# Patient Record
Sex: FEMALE | Race: WHITE | NOT HISPANIC OR LATINO | Employment: OTHER | URBAN - METROPOLITAN AREA
[De-identification: names, ages, dates, MRNs, and addresses within clinical notes are randomized per-mention and may not be internally consistent; named-entity substitution may affect disease eponyms.]

---

## 2017-02-09 ENCOUNTER — GENERIC CONVERSION - ENCOUNTER (OUTPATIENT)
Dept: OTHER | Facility: OTHER | Age: 77
End: 2017-02-09

## 2017-03-29 ENCOUNTER — ALLSCRIPTS OFFICE VISIT (OUTPATIENT)
Dept: OTHER | Facility: OTHER | Age: 77
End: 2017-03-29

## 2017-03-29 DIAGNOSIS — N32.81 OVERACTIVE BLADDER: ICD-10-CM

## 2017-03-29 DIAGNOSIS — I10 ESSENTIAL (PRIMARY) HYPERTENSION: ICD-10-CM

## 2017-03-29 DIAGNOSIS — E03.9 HYPOTHYROIDISM: ICD-10-CM

## 2017-04-11 ENCOUNTER — APPOINTMENT (OUTPATIENT)
Dept: LAB | Age: 77
End: 2017-04-11
Payer: COMMERCIAL

## 2017-04-11 ENCOUNTER — TRANSCRIBE ORDERS (OUTPATIENT)
Dept: LAB | Age: 77
End: 2017-04-11

## 2017-04-11 DIAGNOSIS — E03.9 HYPOTHYROIDISM: ICD-10-CM

## 2017-04-11 DIAGNOSIS — N32.81 OVERACTIVE BLADDER: ICD-10-CM

## 2017-04-11 DIAGNOSIS — I10 ESSENTIAL (PRIMARY) HYPERTENSION: ICD-10-CM

## 2017-04-11 LAB
ALBUMIN SERPL BCP-MCNC: 3.4 G/DL (ref 3.5–5)
ALP SERPL-CCNC: 66 U/L (ref 46–116)
ALT SERPL W P-5'-P-CCNC: 35 U/L (ref 12–78)
ANION GAP SERPL CALCULATED.3IONS-SCNC: 9 MMOL/L (ref 4–13)
AST SERPL W P-5'-P-CCNC: 31 U/L (ref 5–45)
BASOPHILS # BLD AUTO: 0.03 THOUSANDS/ΜL (ref 0–0.1)
BASOPHILS NFR BLD AUTO: 1 % (ref 0–1)
BILIRUB SERPL-MCNC: 0.65 MG/DL (ref 0.2–1)
BUN SERPL-MCNC: 11 MG/DL (ref 5–25)
CALCIUM SERPL-MCNC: 9.1 MG/DL (ref 8.3–10.1)
CHLORIDE SERPL-SCNC: 105 MMOL/L (ref 100–108)
CHOLEST SERPL-MCNC: 188 MG/DL (ref 50–200)
CO2 SERPL-SCNC: 27 MMOL/L (ref 21–32)
CREAT SERPL-MCNC: 0.83 MG/DL (ref 0.6–1.3)
EOSINOPHIL # BLD AUTO: 0.17 THOUSAND/ΜL (ref 0–0.61)
EOSINOPHIL NFR BLD AUTO: 3 % (ref 0–6)
ERYTHROCYTE [DISTWIDTH] IN BLOOD BY AUTOMATED COUNT: 13 % (ref 11.6–15.1)
GFR SERPL CREATININE-BSD FRML MDRD: >60 ML/MIN/1.73SQ M
GLUCOSE P FAST SERPL-MCNC: 91 MG/DL (ref 65–99)
HCT VFR BLD AUTO: 44.4 % (ref 34.8–46.1)
HDLC SERPL-MCNC: 77 MG/DL (ref 40–60)
HGB BLD-MCNC: 15.1 G/DL (ref 11.5–15.4)
LDLC SERPL CALC-MCNC: 85 MG/DL (ref 0–100)
LYMPHOCYTES # BLD AUTO: 2 THOUSANDS/ΜL (ref 0.6–4.47)
LYMPHOCYTES NFR BLD AUTO: 40 % (ref 14–44)
MCH RBC QN AUTO: 34.1 PG (ref 26.8–34.3)
MCHC RBC AUTO-ENTMCNC: 34 G/DL (ref 31.4–37.4)
MCV RBC AUTO: 100 FL (ref 82–98)
MONOCYTES # BLD AUTO: 0.79 THOUSAND/ΜL (ref 0.17–1.22)
MONOCYTES NFR BLD AUTO: 16 % (ref 4–12)
NEUTROPHILS # BLD AUTO: 1.98 THOUSANDS/ΜL (ref 1.85–7.62)
NEUTS SEG NFR BLD AUTO: 40 % (ref 43–75)
NRBC BLD AUTO-RTO: 0 /100 WBCS
PLATELET # BLD AUTO: 228 THOUSANDS/UL (ref 149–390)
PMV BLD AUTO: 12.4 FL (ref 8.9–12.7)
POTASSIUM SERPL-SCNC: 4.4 MMOL/L (ref 3.5–5.3)
PROT SERPL-MCNC: 7 G/DL (ref 6.4–8.2)
RBC # BLD AUTO: 4.43 MILLION/UL (ref 3.81–5.12)
SODIUM SERPL-SCNC: 141 MMOL/L (ref 136–145)
TRIGL SERPL-MCNC: 130 MG/DL
TSH SERPL DL<=0.05 MIU/L-ACNC: 2.61 UIU/ML (ref 0.36–3.74)
WBC # BLD AUTO: 4.98 THOUSAND/UL (ref 4.31–10.16)

## 2017-04-11 PROCEDURE — 80061 LIPID PANEL: CPT

## 2017-04-11 PROCEDURE — 85025 COMPLETE CBC W/AUTO DIFF WBC: CPT

## 2017-04-11 PROCEDURE — 84443 ASSAY THYROID STIM HORMONE: CPT

## 2017-04-11 PROCEDURE — 80053 COMPREHEN METABOLIC PANEL: CPT

## 2017-04-11 PROCEDURE — 36415 COLL VENOUS BLD VENIPUNCTURE: CPT

## 2017-05-03 ENCOUNTER — GENERIC CONVERSION - ENCOUNTER (OUTPATIENT)
Dept: OTHER | Facility: OTHER | Age: 77
End: 2017-05-03

## 2017-05-03 ENCOUNTER — ALLSCRIPTS OFFICE VISIT (OUTPATIENT)
Dept: OTHER | Facility: OTHER | Age: 77
End: 2017-05-03

## 2017-05-03 DIAGNOSIS — M25.562 PAIN IN LEFT KNEE: ICD-10-CM

## 2017-05-03 DIAGNOSIS — Z00.00 ENCOUNTER FOR GENERAL ADULT MEDICAL EXAMINATION WITHOUT ABNORMAL FINDINGS: ICD-10-CM

## 2017-05-03 DIAGNOSIS — M25.561 PAIN IN RIGHT KNEE: ICD-10-CM

## 2017-05-17 ENCOUNTER — HOSPITAL ENCOUNTER (OUTPATIENT)
Dept: RADIOLOGY | Age: 77
Discharge: HOME | End: 2017-05-17
Payer: COMMERCIAL

## 2017-05-17 DIAGNOSIS — Z00.00 ENCOUNTER FOR GENERAL ADULT MEDICAL EXAMINATION WITHOUT ABNORMAL FINDINGS: ICD-10-CM

## 2017-05-17 PROCEDURE — G0202 SCR MAMMO BI INCL CAD: HCPCS

## 2017-05-26 ENCOUNTER — HOSPITAL ENCOUNTER (OUTPATIENT)
Dept: RADIOLOGY | Facility: MEDICAL CENTER | Age: 77
Discharge: HOME | End: 2017-05-26
Payer: COMMERCIAL

## 2017-05-26 ENCOUNTER — ALLSCRIPTS OFFICE VISIT (OUTPATIENT)
Dept: OTHER | Facility: OTHER | Age: 77
End: 2017-05-26

## 2017-05-26 DIAGNOSIS — M25.562 PAIN IN LEFT KNEE: ICD-10-CM

## 2017-05-26 DIAGNOSIS — M25.561 PAIN IN RIGHT KNEE: ICD-10-CM

## 2017-05-26 PROCEDURE — 73560 X-RAY EXAM OF KNEE 1 OR 2: CPT

## 2017-09-15 ENCOUNTER — GENERIC CONVERSION - ENCOUNTER (OUTPATIENT)
Dept: OTHER | Facility: OTHER | Age: 77
End: 2017-09-15

## 2017-09-16 ENCOUNTER — ALLSCRIPTS OFFICE VISIT (OUTPATIENT)
Dept: OTHER | Facility: OTHER | Age: 77
End: 2017-09-16

## 2017-09-16 ENCOUNTER — APPOINTMENT (OUTPATIENT)
Dept: LAB | Facility: MEDICAL CENTER | Age: 77
End: 2017-09-16
Payer: COMMERCIAL

## 2017-09-16 DIAGNOSIS — K57.92 DIVERTICULITIS OF INTESTINE WITHOUT PERFORATION OR ABSCESS WITHOUT BLEEDING: ICD-10-CM

## 2017-09-16 DIAGNOSIS — R30.0 DYSURIA: ICD-10-CM

## 2017-09-16 LAB
ALBUMIN SERPL BCP-MCNC: 4.1 G/DL (ref 3.5–5)
ALP SERPL-CCNC: 67 U/L (ref 46–116)
ALT SERPL W P-5'-P-CCNC: 31 U/L (ref 12–78)
AMYLASE SERPL-CCNC: 35 IU/L (ref 25–115)
ANION GAP SERPL CALCULATED.3IONS-SCNC: 9 MMOL/L (ref 4–13)
AST SERPL W P-5'-P-CCNC: 29 U/L (ref 5–45)
BASOPHILS # BLD AUTO: 0.01 THOUSANDS/ΜL (ref 0–0.1)
BASOPHILS NFR BLD AUTO: 0 % (ref 0–1)
BILIRUB SERPL-MCNC: 0.67 MG/DL (ref 0.2–1)
BILIRUB UR QL STRIP: NEGATIVE
BUN SERPL-MCNC: 21 MG/DL (ref 5–25)
CALCIUM SERPL-MCNC: 9.6 MG/DL (ref 8.3–10.1)
CHLORIDE SERPL-SCNC: 106 MMOL/L (ref 100–108)
CLARITY UR: NORMAL
CO2 SERPL-SCNC: 21 MMOL/L (ref 21–32)
COLOR UR: YELLOW
CREAT SERPL-MCNC: 1.04 MG/DL (ref 0.6–1.3)
EOSINOPHIL # BLD AUTO: 0 THOUSAND/ΜL (ref 0–0.61)
EOSINOPHIL NFR BLD AUTO: 0 % (ref 0–6)
ERYTHROCYTE [DISTWIDTH] IN BLOOD BY AUTOMATED COUNT: 12.8 % (ref 11.6–15.1)
ERYTHROCYTE [SEDIMENTATION RATE] IN BLOOD: 7 MM/HOUR (ref 0–20)
GFR SERPL CREATININE-BSD FRML MDRD: 53 ML/MIN/1.73SQ M
GLUCOSE (HISTORICAL): NEGATIVE
GLUCOSE SERPL-MCNC: 137 MG/DL (ref 65–140)
HCT VFR BLD AUTO: 47.7 % (ref 34.8–46.1)
HGB BLD-MCNC: 16.2 G/DL (ref 11.5–15.4)
HGB UR QL STRIP.AUTO: NORMAL
KETONES UR STRIP-MCNC: NEGATIVE MG/DL
LEUKOCYTE ESTERASE UR QL STRIP: NORMAL
LYMPHOCYTES # BLD AUTO: 1.43 THOUSANDS/ΜL (ref 0.6–4.47)
LYMPHOCYTES NFR BLD AUTO: 8 % (ref 14–44)
MCH RBC QN AUTO: 34 PG (ref 26.8–34.3)
MCHC RBC AUTO-ENTMCNC: 34 G/DL (ref 31.4–37.4)
MCV RBC AUTO: 100 FL (ref 82–98)
MONOCYTES # BLD AUTO: 1.01 THOUSAND/ΜL (ref 0.17–1.22)
MONOCYTES NFR BLD AUTO: 6 % (ref 4–12)
NEUTROPHILS # BLD AUTO: 14.85 THOUSANDS/ΜL (ref 1.85–7.62)
NEUTS SEG NFR BLD AUTO: 86 % (ref 43–75)
NITRITE UR QL STRIP: NEGATIVE
NRBC BLD AUTO-RTO: 0 /100 WBCS
PH UR STRIP.AUTO: 5 [PH]
PLATELET # BLD AUTO: 259 THOUSANDS/UL (ref 149–390)
PMV BLD AUTO: 12.9 FL (ref 8.9–12.7)
POTASSIUM SERPL-SCNC: 5 MMOL/L (ref 3.5–5.3)
PROT SERPL-MCNC: 8 G/DL (ref 6.4–8.2)
PROT UR STRIP-MCNC: NORMAL MG/DL
RBC # BLD AUTO: 4.77 MILLION/UL (ref 3.81–5.12)
SODIUM SERPL-SCNC: 136 MMOL/L (ref 136–145)
SP GR UR STRIP.AUTO: 1.02
UROBILINOGEN UR QL STRIP.AUTO: NEGATIVE
WBC # BLD AUTO: 17.4 THOUSAND/UL (ref 4.31–10.16)

## 2017-09-16 PROCEDURE — 85652 RBC SED RATE AUTOMATED: CPT

## 2017-09-16 PROCEDURE — 80053 COMPREHEN METABOLIC PANEL: CPT

## 2017-09-16 PROCEDURE — 85025 COMPLETE CBC W/AUTO DIFF WBC: CPT

## 2017-09-16 PROCEDURE — 82150 ASSAY OF AMYLASE: CPT

## 2017-09-16 PROCEDURE — 36415 COLL VENOUS BLD VENIPUNCTURE: CPT

## 2017-09-17 ENCOUNTER — GENERIC CONVERSION - ENCOUNTER (OUTPATIENT)
Dept: OTHER | Facility: OTHER | Age: 77
End: 2017-09-17

## 2017-09-18 ENCOUNTER — TRANSCRIBE ORDERS (OUTPATIENT)
Dept: ADMINISTRATIVE | Facility: HOSPITAL | Age: 77
End: 2017-09-18

## 2017-09-18 ENCOUNTER — APPOINTMENT (OUTPATIENT)
Dept: LAB | Facility: HOSPITAL | Age: 77
End: 2017-09-18
Attending: INTERNAL MEDICINE
Payer: COMMERCIAL

## 2017-09-18 DIAGNOSIS — K57.92 DIVERTICULITIS OF INTESTINE WITHOUT PERFORATION OR ABSCESS WITHOUT BLEEDING, UNSPECIFIED PART OF INTESTINAL TRACT: Primary | ICD-10-CM

## 2017-09-18 DIAGNOSIS — R30.0 DYSURIA: ICD-10-CM

## 2017-09-18 PROCEDURE — 87077 CULTURE AEROBIC IDENTIFY: CPT

## 2017-09-18 PROCEDURE — 87186 SC STD MICRODIL/AGAR DIL: CPT

## 2017-09-18 PROCEDURE — 87086 URINE CULTURE/COLONY COUNT: CPT

## 2017-09-19 ENCOUNTER — HOSPITAL ENCOUNTER (OUTPATIENT)
Dept: RADIOLOGY | Age: 77
Discharge: HOME | End: 2017-09-19
Payer: COMMERCIAL

## 2017-09-19 DIAGNOSIS — K57.92 DIVERTICULITIS OF INTESTINE WITHOUT PERFORATION OR ABSCESS WITHOUT BLEEDING, UNSPECIFIED PART OF INTESTINAL TRACT: ICD-10-CM

## 2017-09-19 PROCEDURE — 74177 CT ABD & PELVIS W/CONTRAST: CPT

## 2017-09-19 RX ADMIN — IOHEXOL 100 ML: 350 INJECTION, SOLUTION INTRAVENOUS at 14:28

## 2017-09-20 LAB
BACTERIA UR CULT: NORMAL
BACTERIA UR CULT: NORMAL

## 2017-09-27 ENCOUNTER — GENERIC CONVERSION - ENCOUNTER (OUTPATIENT)
Dept: OTHER | Facility: OTHER | Age: 77
End: 2017-09-27

## 2018-01-13 VITALS
BODY MASS INDEX: 36 KG/M2 | OXYGEN SATURATION: 98 % | SYSTOLIC BLOOD PRESSURE: 140 MMHG | RESPIRATION RATE: 16 BRPM | WEIGHT: 224 LBS | HEIGHT: 66 IN | HEART RATE: 66 BPM | TEMPERATURE: 97.7 F | DIASTOLIC BLOOD PRESSURE: 72 MMHG

## 2018-01-14 VITALS
OXYGEN SATURATION: 97 % | DIASTOLIC BLOOD PRESSURE: 74 MMHG | SYSTOLIC BLOOD PRESSURE: 142 MMHG | RESPIRATION RATE: 16 BRPM | WEIGHT: 224.2 LBS | TEMPERATURE: 97.2 F | HEART RATE: 75 BPM | BODY MASS INDEX: 36.03 KG/M2 | HEIGHT: 66 IN

## 2018-01-15 VITALS
DIASTOLIC BLOOD PRESSURE: 81 MMHG | HEART RATE: 101 BPM | HEIGHT: 66 IN | SYSTOLIC BLOOD PRESSURE: 147 MMHG | BODY MASS INDEX: 36 KG/M2 | WEIGHT: 224 LBS

## 2018-01-16 NOTE — MISCELLANEOUS
Message  left message that i received her message about the vesicare being too expensive  she was on oxybutinin in the past and does she want us to call that in        Signatures   Electronically signed by : Carolyn Coello DO; Feb 9 2017 11:16AM EST                       (Author)

## 2018-01-17 NOTE — MISCELLANEOUS
Message  told pt her ct results - she is feeling improved with antibiotics      Signatures   Electronically signed by : Luke Tomlinson DO; Sep 27 2017  5:31AM EST                       (Author)

## 2018-01-22 VITALS
SYSTOLIC BLOOD PRESSURE: 146 MMHG | DIASTOLIC BLOOD PRESSURE: 79 MMHG | BODY MASS INDEX: 36 KG/M2 | WEIGHT: 224 LBS | HEIGHT: 66 IN | HEART RATE: 65 BPM

## 2018-01-22 VITALS
DIASTOLIC BLOOD PRESSURE: 84 MMHG | RESPIRATION RATE: 16 BRPM | OXYGEN SATURATION: 97 % | SYSTOLIC BLOOD PRESSURE: 120 MMHG | TEMPERATURE: 98.4 F | HEART RATE: 70 BPM

## 2018-01-24 ENCOUNTER — OFFICE VISIT (OUTPATIENT)
Dept: FAMILY MEDICINE CLINIC | Facility: CLINIC | Age: 78
End: 2018-01-24
Payer: COMMERCIAL

## 2018-01-24 VITALS
HEART RATE: 74 BPM | SYSTOLIC BLOOD PRESSURE: 108 MMHG | DIASTOLIC BLOOD PRESSURE: 72 MMHG | BODY MASS INDEX: 36 KG/M2 | HEIGHT: 66 IN | TEMPERATURE: 98.8 F | RESPIRATION RATE: 16 BRPM | OXYGEN SATURATION: 96 % | WEIGHT: 224 LBS

## 2018-01-24 DIAGNOSIS — I10 ESSENTIAL HYPERTENSION: ICD-10-CM

## 2018-01-24 DIAGNOSIS — R00.2 PALPITATIONS: Primary | ICD-10-CM

## 2018-01-24 DIAGNOSIS — R94.31 EKG, ABNORMAL: ICD-10-CM

## 2018-01-24 PROBLEM — E03.9 HYPOTHYROIDISM: Status: ACTIVE | Noted: 2018-01-24

## 2018-01-24 PROBLEM — N32.81 OVERACTIVE BLADDER: Status: ACTIVE | Noted: 2018-01-24

## 2018-01-24 PROCEDURE — 99215 OFFICE O/P EST HI 40 MIN: CPT | Performed by: INTERNAL MEDICINE

## 2018-01-24 PROCEDURE — 3725F SCREEN DEPRESSION PERFORMED: CPT | Performed by: INTERNAL MEDICINE

## 2018-01-24 PROCEDURE — 93000 ELECTROCARDIOGRAM COMPLETE: CPT | Performed by: INTERNAL MEDICINE

## 2018-01-24 PROCEDURE — 1101F PT FALLS ASSESS-DOCD LE1/YR: CPT | Performed by: INTERNAL MEDICINE

## 2018-01-24 RX ORDER — AMPICILLIN TRIHYDRATE 250 MG
CAPSULE ORAL
COMMUNITY
Start: 2016-05-27

## 2018-01-24 RX ORDER — OXYBUTYNIN CHLORIDE 10 MG/1
1 TABLET, EXTENDED RELEASE ORAL
COMMUNITY
Start: 2016-05-27 | End: 2019-01-09 | Stop reason: SDUPTHER

## 2018-01-24 RX ORDER — LOSARTAN POTASSIUM 25 MG/1
1 TABLET ORAL DAILY
COMMUNITY
Start: 2016-05-27 | End: 2018-03-23 | Stop reason: SDUPTHER

## 2018-01-24 RX ORDER — LEVOTHYROXINE SODIUM 112 UG/1
1 TABLET ORAL DAILY
COMMUNITY
Start: 2016-05-27 | End: 2018-02-05 | Stop reason: SDUPTHER

## 2018-01-24 RX ORDER — NICOTINE POLACRILEX 2 MG
GUM BUCCAL
COMMUNITY
Start: 2016-05-27

## 2018-01-24 RX ORDER — MONTELUKAST SODIUM 10 MG/1
1 TABLET ORAL DAILY
COMMUNITY
Start: 2016-05-27 | End: 2018-03-23 | Stop reason: SDUPTHER

## 2018-01-24 RX ORDER — METOPROLOL SUCCINATE 50 MG/1
1 TABLET, EXTENDED RELEASE ORAL DAILY
COMMUNITY
Start: 2016-05-27 | End: 2019-01-09 | Stop reason: SDUPTHER

## 2018-01-24 RX ORDER — CHLORAL HYDRATE 500 MG
CAPSULE ORAL
COMMUNITY
Start: 2016-05-27

## 2018-01-24 RX ORDER — DOCUSATE SODIUM 100 MG/1
CAPSULE, LIQUID FILLED ORAL 3 TIMES DAILY
COMMUNITY
Start: 2016-05-27

## 2018-01-24 NOTE — PROGRESS NOTES
Assessment    1  HTN (hypertension) (401 9) (I10)   2  Hypothyroidism (244 9) (E03 9)   3  Seasonal allergies (477 9) (J30 2)   4  Right knee pain (719 46) (M28 561)    Plan  Health Maintenance    · * MAMMO SCREENING BILATERAL W CAD; Status:Hold For - Scheduling; Requested  for:82Zmi1814;   Right knee pain    · *1 - [facility]  *  Status: Active  Requested for:  54SXC5856  Care Summary provided  : Yes    Discussion/Summary    Pt states she received prevnar and pneumovax  Impression: Initial Annual Wellness Visit  Cardiovascular screening and counseling: the risks and benefits of screening were discussed and screening is current  Diabetes screening and counseling: the risks and benefits of screening were discussed and screening is current  Colorectal cancer screening and counseling: the risks and benefits of screening were discussed and screening is current  Cervical cancer screening and counseling: the risks and benefits of screening were discussed and screening not indicated  Osteoporosis screening and counseling: the risks and benefits of screening were discussed and screening is current  Glaucoma screening and counseling: the risks and benefits of screening were discussed and screening is current  Immunizations: the risks and benefits of influenza vaccination were discussed with the patient, influenza vaccine is up to date this year, the risks and benefits of pneumococcal vaccination were discussed with the patient, the lifetime pneumococcal vaccine has been completed and the risks and benefits of the Tdap vaccine were discussed with the patient  Advance Directive Planning: complete and up to date  Patient Discussion: plan discussed with the patient, follow-up visit needed in one year  Possible side effects of new medications were reviewed with the patient/guardian today  The treatment plan was reviewed with the patient/guardian   The patient/guardian understands and agrees with the treatment plan      Chief Complaint  Patient presents for annual wellness visit  History of Present Illness  Welcome to Medicare and Wellness Visits: The patient is being seen for the initial annual wellness visit  Medicare Screening and Risk Factors   Hospitalizations: she has been previously hospitalizied  Once per lifetime medicare screening tests: ECG  Medicare Screening Tests Risk Questions   Osteoporosis risk assessment: female gender and over 48years of age  HIV risk assessment: denies [facility]'s  Drug and Alcohol Use: The patient has never smoked cigarettes  She has never used illicit drugs  Diet and Physical Activity: Current diet includes well balanced meals  Exercise: walking  Mood Disorder and Cognitive Impairment Screening: She denies feeling down, depressed, or hopeless over the past two weeks  She denies feeling little interest or pleasure in doing things over the past two weeks  Cognitive impairment screening: denies difficulty learning/retaining new information, denies difficulty handling complex tasks, denies difficulty with reasoning, denies difficulty with spatial ability and orientation, denies difficulty with language and denies difficulty with behavior  Functional Ability/Level of Safety: Hearing is normal bilaterally and a hearing aid is not used  The patient is currently able to do activities of daily living without limitations, able to do instrumental activities of daily living without limitations and able to participate in social activities without limitations  Activities of daily living details: does not need help using the phone, no transportation help needed, does not need help shopping, no meal preparation help needed, does not need help doing housework, does not need help doing laundry, does not need help managing medications and does not need help managing money   Fall risk factors:  deconditioning, but no mobility impairment, no urinary incontinence, no cognitive impairment and no previous fall  Home safety risk factors:  no grab bars in the bathroom and does not have grab bars in th bathroom  Advance Directives: Advance directives: living will, advance directives and full code  end of life decisions were reviewed with the patient and I agree with the patient's decisions  Co-Managers and Medical Equipment/Suppliers: See Patient Care Team      Review of Systems    Constitutional: negative  Head and Face: negative  Eyes: negative  ENT: negative  Cardiovascular: negative  Respiratory: negative  Gastrointestinal: negative  Active Problems    1  Fatigue (780 79) (R53 83)   2  HTN (hypertension) (401 9) (I10)   3  Hypothyroidism (244 9) (E03 9)   4  Muscle ache (729 1) (M79 1)   5  Nasal crusting (478 19) (J34 89)   6  Nasal mucositis (ulcerative) (478 11) (J34 81)   7  Overactive bladder (596 51) (N32 81)   8  Post-menopausal (V49 81) (Z78 0)   9  Seasonal allergies (477 9) (J30 2)    Past Medical History    · History of Acute URI (465 9) (J06 9)   · History of Denial of substance abuse   · History of fatigue (V13 89) (L70 420)   · Denied: History of mental disorder   · History of Muscle ache (729 1) (M79 1)    The active problems and past medical history were reviewed and updated today  Surgical History    · History of Amputation Of Leg Above Knee   · History of Ankle Surgery   · History of Appendectomy   · History of Cholecystectomy Laparoscopic   · History of Hand Incision Tendon Sheath Of A Finger   · History of Neuroplasty Decompression Median Nerve At Carpal Tunnel   · History of Total Abdominal Hysterectomy With Removal Of Both Ovaries   · History of Tubal Ligation    The surgical history was reviewed and updated today         Family History  Mother    · Family history of   Father    · Family history of   Family History    · Denied: Family history of Denial of substance abuse   · Denied: No family history of mental disorder    The family history was reviewed and updated today  Social History    · Never a smoker   · Occasional alcohol use  The social history was reviewed and updated today  The social history was reviewed and is unchanged  Current Meds   1  Aspir-81 81 MG Oral Tablet Delayed Release; TAKE 1 TABLET (81 MG TOTAL) BY   MOUTH DAILY; Therapy: 80PVP9686 to Recorded   2  Biotin 1 MG Oral Capsule; Therapy: 10PKP9613 to Recorded   3  Colace 100 MG Oral Capsule; tid; Therapy: 57QDG3912 to Recorded   4  Coral Calcium 500 MG TABS; Therapy: 80GSI2838 to Recorded   5  Levothyroxine Sodium 112 MCG Oral Tablet; TAKE 1 TABLET DAILY; Therapy: 81KMX7930 to (Ed Doctor)  Requested for: 04JYQ5712; Last   Rx:10Jan2017 Ordered   6  Losartan Potassium 25 MG Oral Tablet; TAKE 1 TABLET DAILY; Therapy: 99FZT1490 to (Evaluate:39Bpc8744)  Requested for: 54NFM7435; Last   Rx:29Mar2017 Ordered   7  Metamucil 48 57 % Oral Powder; Therapy: 20YSM4686 to Recorded   8  Metoprolol Succinate ER 50 MG Oral Tablet Extended Release 24 Hour; TAKE 1 TABLET   DAILY; Therapy: 33NHB1874 to (Ed Doctor)  Requested for: 40SAU5447; Last   Rx:10Jan2017 Ordered   9  Montelukast Sodium 10 MG Oral Tablet; TAKE 1 TABLET DAILY AS DIRECTED; Therapy: 02WWU6341 to (Ivone Gilbert)  Requested for: 89QDM2359; Last   Rx:29Mar2017 Ordered   10  Multiple Vitamins Oral Tablet; Therapy: 76EQZ8190 to Recorded   11  Omega 3 1000 MG Oral Capsule; Therapy: 44RUH2888 to Recorded   12  Oxybutynin Chloride ER 10 MG Oral Tablet Extended Release 24 Hour; TAKE 1 TABLET    Bedtime; Therapy: 13JDG3096 to (UXSQA:62JNA7329)  Requested for: 11EZX6430; Last    Rx:92Tkk6541 Ordered   13  Red Yeast Rice 600 MG Oral Capsule; Therapy: 46EIR6256 to Recorded    The medication list was reviewed and updated today  Allergies    1   No Known Drug Allergies    Immunizations   1    PPSV  01-Oct-2014     Vitals  Signs    Temperature: 98 4 F, Tympanic  Heart Rate: 70, L Brachial Artery  Pulse Quality: Normal, L Brachial Artery  Respiration Quality: Normal  Respiration: 16  Systolic: 670, LUE, Sitting  Diastolic: 84, LUE, Sitting  BP Cuff Size: Standard  O2 Saturation: 97    Physical Exam    Constitutional   General appearance: No acute distress, well appearing and well nourished  Head and Face   Head and face: Normal     Palpation of the face and sinuses: No sinus tenderness  Eyes   Conjunctiva and lids: No swelling, erythema or discharge  Pupils and irises: Equal, round, reactive to light  Ears, Nose, Mouth, and Throat   External inspection of ears and nose: Normal     Otoscopic examination: Tympanic membranes translucent with normal light reflex  Canals patent without erythema  Hearing: Normal     Nasal mucosa, septum, and turbinates: Normal without edema or erythema  Lips, teeth, and gums: Normal, good dentition  Oropharynx: Normal with no erythema, edema, exudate or lesions  Neck   Neck: Supple, symmetric, trachea midline, no masses  Thyroid: Normal, no thyromegaly  Pulmonary   Respiratory effort: No increased work of breathing or signs of respiratory distress  Palpation of chest: Normal     Auscultation of lungs: Clear to auscultation  Cardiovascular   Auscultation of heart: Normal rate and rhythm, normal S1 and S2, no murmurs  Carotid pulses: 2+ bilaterally  Abdomen   Abdomen: Non-tender, no masses  Liver and spleen: No hepatomegaly or splenomegaly  Lymphatic   Palpation of lymph nodes in neck: No lymphadenopathy         Future Appointments    Date/Time Provider Specialty Site   11/15/2017 10:30 AM Merary Yarbrough DO Internal Medicine [address] [facility]   Electronically signed by : Sabina Serna DO; May  3 2017 12:24PM EST                       (Author)

## 2018-01-24 NOTE — PROGRESS NOTES
Assessment/Plan:    No problem-specific Assessment & Plan notes found for this encounter  Diagnoses and all orders for this visit:    Palpitations  -     POCT ECG    Essential hypertension    Other orders  -     Aspirin (ASPIR-81 PO); Aspir-81 81 MG Oral Tablet Delayed Release  TAKE 1 TABLET (81 MG TOTAL) BY MOUTH DAILY  Refills: 0       [place] Settles DO;  Started 27-May-2016  Active  -     Biotin 1 MG CAPS; Biotin 1 MG Oral Capsule   Refills: 0       Andrew Settles DO;  Started 27-May-2016  Active  -     docusate sodium (COLACE) 100 mg capsule; Take by mouth 3 (three) times a day  -     Coral Calcium 1000 (390 Ca) MG TABS; Coral Calcium 500 MG Oral Tablet   Refills: 0       [place] Settles DO;  Started 27-May-2016  Active  -     levothyroxine 112 mcg tablet; Take 1 tablet by mouth daily  -     losartan (COZAAR) 25 mg tablet; Take 1 tablet by mouth daily  -     Psyllium (METAMUCIL FIBER PO); Take by mouth  -     metoprolol succinate (TOPROL-XL) 50 mg 24 hr tablet; Take 1 tablet by mouth daily  -     montelukast (SINGULAIR) 10 mg tablet; Take 1 tablet by mouth daily  -     Multiple Vitamins-Minerals (MULTIVITAMIN ADULT PO); Take by mouth  -     Omega-3 1000 MG CAPS; Take by mouth  -     oxybutynin (DITROPAN-XL) 10 MG 24 hr tablet; Take 1 tablet by mouth  -     Red Yeast Rice 600 MG CAPS; Take by mouth  -     doxylamine (UNISON) 25 MG tablet; Sleep Aid 25 MG Oral Tablet   Refills: 0       Andrew Settles DO;  Started 27-May-2016  Active          Subjective:      Patient ID: Alban Fothergill is a 78 y o  female  Pt complains of palpitations  She was afraid to drive  She denies syncope  The palp started over the weekend  It happens intermitant  Denies sob  She felt it was worse at night  She also says she was in the hospital in [place]  She was told her ekg was normal      Palpitations   Pertinent negatives include no nausea         The following portions of the patient's history were reviewed and updated as appropriate: allergies, current medications, past family history, past medical history, past social history, past surgical history and problem list     Review of Systems   Constitutional: Negative  HENT: Negative  Respiratory: Negative  Cardiovascular: Positive for palpitations and leg swelling  Gastrointestinal: Negative for nausea  Neurological: Negative for syncope  Objective:     Physical Exam   Constitutional: She appears well-developed and well-nourished  HENT:   Head: Normocephalic and atraumatic  Neck: Normal range of motion  Neck supple  Cardiovascular: Normal rate and regular rhythm      Pulmonary/Chest: Effort normal and breath sounds normal

## 2018-01-29 ENCOUNTER — TRANSITIONAL CARE MANAGEMENT (OUTPATIENT)
Dept: FAMILY MEDICINE CLINIC | Facility: CLINIC | Age: 78
End: 2018-01-29

## 2018-02-05 ENCOUNTER — OFFICE VISIT (OUTPATIENT)
Dept: FAMILY MEDICINE CLINIC | Facility: CLINIC | Age: 78
End: 2018-02-05
Payer: COMMERCIAL

## 2018-02-05 VITALS
RESPIRATION RATE: 16 BRPM | SYSTOLIC BLOOD PRESSURE: 122 MMHG | HEIGHT: 66 IN | TEMPERATURE: 97.7 F | OXYGEN SATURATION: 98 % | DIASTOLIC BLOOD PRESSURE: 76 MMHG | HEART RATE: 80 BPM | WEIGHT: 225.8 LBS | BODY MASS INDEX: 36.29 KG/M2

## 2018-02-05 DIAGNOSIS — IMO0001 TRANSITION OF CARE PERFORMED WITH SHARING OF CLINICAL SUMMARY: Primary | ICD-10-CM

## 2018-02-05 DIAGNOSIS — I10 HYPERTENSION, UNSPECIFIED TYPE: ICD-10-CM

## 2018-02-05 DIAGNOSIS — K21.9 GASTROESOPHAGEAL REFLUX DISEASE WITHOUT ESOPHAGITIS: ICD-10-CM

## 2018-02-05 DIAGNOSIS — E03.9 HYPOTHYROIDISM, UNSPECIFIED TYPE: ICD-10-CM

## 2018-02-05 PROCEDURE — 99495 TRANSJ CARE MGMT MOD F2F 14D: CPT | Performed by: INTERNAL MEDICINE

## 2018-02-05 RX ORDER — LEVOTHYROXINE SODIUM 112 UG/1
112 TABLET ORAL DAILY
Qty: 90 TABLET | Refills: 1 | Status: SHIPPED | OUTPATIENT
Start: 2018-02-05 | End: 2018-06-22

## 2018-02-05 NOTE — PATIENT INSTRUCTIONS
rto q 6 months  Her card eval was negative and her s/s felt from gerd    She will use pepcid instead of ppi 2nd to h/a from ppi

## 2018-02-05 NOTE — PROGRESS NOTES
Assessment/Plan:         Diagnoses and all orders for this visit:    Transition of care performed with sharing of clinical summary  Comments:  abnormal ekg - saw card and felt negative cardiac problem  also neg for dvt with her swollen rt leg  Gastroesophageal reflux disease without esophagitis  Comments:  she will try pepcid bid  she relates got h/a from ppi    Hypertension, unspecified type  Comments:  controlled  Hypothyroidism, unspecified type  Comments:  controlled on med  Orders:  -     levothyroxine 112 mcg tablet; Take 1 tablet (112 mcg total) by mouth daily for 90 days          Subjective:      Patient ID: Kaden Emanuel is a 78 y o  female  Pt is back from hospital   She says she was diagnosed with gerd  Her ekg was abnormal and card saw her and they did not feel she had old mi  The following portions of the patient's history were reviewed and updated as appropriate: She  has no past medical history on file  She  does not have any pertinent problems on file  She  has a past surgical history that includes Gallbladder surgery; Appendectomy; and Hysterectomy  Her family history is not on file  She  reports that she has never smoked  She has never used smokeless tobacco  She reports that she drinks alcohol  She reports that she does not use drugs  Current Outpatient Prescriptions   Medication Sig Dispense Refill    Aspirin (ASPIR-81 PO) Aspir-81 81 MG Oral Tablet Delayed Release  TAKE 1 TABLET (81 MG TOTAL) BY MOUTH DAILY     Refills: 0       Josette Corti DO;  Started 27-May-2016  Active      Biotin 1 MG CAPS Biotin 1 MG Oral Capsule   Refills: 0       Josette Corti DO;  Started 27-May-2016  Active      Coral Calcium 1000 (390 Ca) MG TABS Coral Calcium 500 MG Oral Tablet   Refills: 0       Josette Corti DO;  Started 27-May-2016  Active      docusate sodium (COLACE) 100 mg capsule Take by mouth 3 (three) times a day      doxylamine (UNISON) 25 MG tablet Sleep Aid 25 MG Oral Tablet   Refills: 0       Vitale Shawnee DO;  Started 27-May-2016  Active      levothyroxine 112 mcg tablet Take 1 tablet (112 mcg total) by mouth daily for 90 days 90 tablet 1    losartan (COZAAR) 25 mg tablet Take 1 tablet by mouth daily      metoprolol succinate (TOPROL-XL) 50 mg 24 hr tablet Take 1 tablet by mouth daily      montelukast (SINGULAIR) 10 mg tablet Take 1 tablet by mouth daily      Multiple Vitamins-Minerals (MULTIVITAMIN ADULT PO) Take by mouth      Omega-3 1000 MG CAPS Take by mouth      oxybutynin (DITROPAN-XL) 10 MG 24 hr tablet Take 1 tablet by mouth      Psyllium (METAMUCIL FIBER PO) Take by mouth      Red Yeast Rice 600 MG CAPS Take by mouth       No current facility-administered medications for this visit  Current Outpatient Prescriptions on File Prior to Visit   Medication Sig    Aspirin (ASPIR-81 PO) Aspir-81 81 MG Oral Tablet Delayed Release  TAKE 1 TABLET (81 MG TOTAL) BY MOUTH DAILY     Refills: 0       Vitale Shawnee DO;  Started 27-May-2016  Active    Biotin 1 MG CAPS Biotin 1 MG Oral Capsule   Refills: 0       [facility] DO;  Started 27-May-2016  Active    Coral Calcium 1000 (390 Ca) MG TABS Coral Calcium 500 MG Oral Tablet   Refills: 0       [facility] DO;  Started 27-May-2016  Active    docusate sodium (COLACE) 100 mg capsule Take by mouth 3 (three) times a day    doxylamine (UNISON) 25 MG tablet Sleep Aid 25 MG Oral Tablet   Refills: 0       [facility] DO;  Started 27-May-2016  Active    losartan (COZAAR) 25 mg tablet Take 1 tablet by mouth daily    metoprolol succinate (TOPROL-XL) 50 mg 24 hr tablet Take 1 tablet by mouth daily    montelukast (SINGULAIR) 10 mg tablet Take 1 tablet by mouth daily    Multiple Vitamins-Minerals (MULTIVITAMIN ADULT PO) Take by mouth    Omega-3 1000 MG CAPS Take by mouth    oxybutynin (DITROPAN-XL) 10 MG 24 hr tablet Take 1 tablet by mouth    Psyllium (METAMUCIL FIBER PO) Take by mouth    Red Yeast Rice 600 MG CAPS Take by mouth    [DISCONTINUED] levothyroxine 112 mcg tablet Take 1 tablet by mouth daily     No current facility-administered medications on file prior to visit  She is allergic to prednisone and vicodin [hydrocodone-acetaminophen]       Review of Systems   Constitutional: Negative  HENT: Negative  Respiratory: Negative  Cardiovascular: Negative  Negative for palpitations  Neurological: Positive for headaches  Objective:     Physical Exam   Constitutional: She appears well-developed and well-nourished  HENT:   Head: Normocephalic and atraumatic  Neck: Normal range of motion  Neck supple  Cardiovascular: Normal rate and regular rhythm  Pulmonary/Chest: Effort normal and breath sounds normal    Abdominal: Soft   Bowel sounds are normal

## 2018-03-23 DIAGNOSIS — T78.40XD ALLERGIC DISORDER, SUBSEQUENT ENCOUNTER: Primary | ICD-10-CM

## 2018-03-23 DIAGNOSIS — I10 HYPERTENSION, UNSPECIFIED TYPE: ICD-10-CM

## 2018-03-23 RX ORDER — MONTELUKAST SODIUM 10 MG/1
TABLET ORAL
Qty: 90 TABLET | Refills: 1 | Status: SHIPPED | OUTPATIENT
Start: 2018-03-23 | End: 2018-09-18 | Stop reason: SDUPTHER

## 2018-03-23 RX ORDER — LOSARTAN POTASSIUM 25 MG/1
TABLET ORAL
Qty: 90 TABLET | Refills: 1 | Status: SHIPPED | OUTPATIENT
Start: 2018-03-23 | End: 2018-09-18 | Stop reason: SDUPTHER

## 2018-04-13 ENCOUNTER — OFFICE VISIT (OUTPATIENT)
Dept: OBGYN CLINIC | Facility: MEDICAL CENTER | Age: 78
End: 2018-04-13
Payer: COMMERCIAL

## 2018-04-13 VITALS
BODY MASS INDEX: 35.36 KG/M2 | DIASTOLIC BLOOD PRESSURE: 77 MMHG | SYSTOLIC BLOOD PRESSURE: 139 MMHG | HEIGHT: 66 IN | HEART RATE: 69 BPM | WEIGHT: 220 LBS

## 2018-04-13 DIAGNOSIS — M25.561 CHRONIC PAIN OF RIGHT KNEE: Primary | ICD-10-CM

## 2018-04-13 DIAGNOSIS — M76.891 TENDINITIS OF RIGHT KNEE: ICD-10-CM

## 2018-04-13 DIAGNOSIS — G89.29 CHRONIC PAIN OF RIGHT KNEE: Primary | ICD-10-CM

## 2018-04-13 PROCEDURE — 20610 DRAIN/INJ JOINT/BURSA W/O US: CPT | Performed by: ORTHOPAEDIC SURGERY

## 2018-04-13 PROCEDURE — 99213 OFFICE O/P EST LOW 20 MIN: CPT | Performed by: ORTHOPAEDIC SURGERY

## 2018-04-13 RX ORDER — LIDOCAINE HYDROCHLORIDE 10 MG/ML
2 INJECTION, SOLUTION INFILTRATION; PERINEURAL
Status: COMPLETED | OUTPATIENT
Start: 2018-04-13 | End: 2018-04-13

## 2018-04-13 RX ORDER — BETAMETHASONE SODIUM PHOSPHATE AND BETAMETHASONE ACETATE 3; 3 MG/ML; MG/ML
12 INJECTION, SUSPENSION INTRA-ARTICULAR; INTRALESIONAL; INTRAMUSCULAR; SOFT TISSUE
Status: COMPLETED | OUTPATIENT
Start: 2018-04-13 | End: 2018-04-13

## 2018-04-13 RX ORDER — BUPIVACAINE HYDROCHLORIDE 2.5 MG/ML
2 INJECTION, SOLUTION INFILTRATION; PERINEURAL
Status: COMPLETED | OUTPATIENT
Start: 2018-04-13 | End: 2018-04-13

## 2018-04-13 RX ADMIN — BUPIVACAINE HYDROCHLORIDE 2 ML: 2.5 INJECTION, SOLUTION INFILTRATION; PERINEURAL at 13:56

## 2018-04-13 RX ADMIN — LIDOCAINE HYDROCHLORIDE 2 ML: 10 INJECTION, SOLUTION INFILTRATION; PERINEURAL at 13:56

## 2018-04-13 RX ADMIN — BETAMETHASONE SODIUM PHOSPHATE AND BETAMETHASONE ACETATE 12 MG: 3; 3 INJECTION, SUSPENSION INTRA-ARTICULAR; INTRALESIONAL; INTRAMUSCULAR; SOFT TISSUE at 13:56

## 2018-04-13 NOTE — PROGRESS NOTES
Subjective; Adult female patient known to us  It has been 6 months plus since we last saw this individual   She has had a recent bout of posterior buttock pain and posterior thigh pain that is akin to or sounds like sciatica  This occurs without back pain without motor weakness  This occurs with no symptomatology below the level of the knee  She has had previous left hip greater trochanteric bursitis and this is not resemblant of her previous bursitis  History reviewed  No pertinent past medical history  Past Surgical History:   Procedure Laterality Date    APPENDECTOMY      GALLBLADDER SURGERY      HYSTERECTOMY         Family History   Problem Relation Age of Onset    Heart disease Mother     Heart disease Father        Social History   Substance Use Topics    Smoking status: Never Smoker    Smokeless tobacco: Never Used    Alcohol use Yes      Comment: wine with dinner     Exam;  Her right knee offers a varus deformity  She has pain involving the medial and lateral compartments to palpation  Today she also exhibits pain in the distal iliotibial band right knee  In the standing position she had no palpable or percussive will reproducible lumbar spine pain  She had no discomfort overlying the sacral ala or the SI joint left side  She denied discomfort over the PSIS and proximal hamstring she did have modest or boring discomfort over the left lower outer quadrant with the sciatic trough  Motor exam seated position for the patient hip flexion knee extension TA EHL and peroneal function intact in symmetrical  Sensorium intact to soft sharp touch L2 through the L5 dermatomes      Large joint arthrocentesis  Date/Time: 4/13/2018 1:56 PM  Consent given by: patient  Supporting Documentation  Indications: pain   Procedure Details  Location: knee - R knee  Needle size: 22 G  Ultrasound guidance: no  Medications administered: 2 mL bupivacaine 0 25 %; 2 mL lidocaine 1 %; 12 mg betamethasone acetate-betamethasone sodium phosphate 6 (3-3) mg/mL      Large joint arthrocentesis  Date/Time: 4/13/2018 1:56 PM  Supporting Documentation  Indications: pain and diagnostic evaluation   Procedure Details  Location: knee - R knee (Distal iliotibial band)  Needle size: 22 G  Approach: lateral    Patient tolerance: patient tolerated the procedure well with no immediate complications  Dressing:  Sterile dressing applied      Impression; Historical episode of sciatica which has improved with time and NSAIDS  Her sciatica has no motor weakness  No back pain    History of arthritic knee  Iliotibial band tendinitis right knee    Plan;    She underwent injections to both the right knee intra-articular space and the distal iliotibial band at its insertion      We will see her in follow-up in 3 months or just prior to her embarking on her vacation    We are attempting to precert her for a Visco supplement or lubricant for her knees    This completed her care her exam was reviewed by and plan formulated by the attending surgeon  It was my privilege to assist him in the delivery of her care

## 2018-05-23 ENCOUNTER — HOSPITAL ENCOUNTER (OUTPATIENT)
Dept: RADIOLOGY | Age: 78
Discharge: HOME | End: 2018-05-23

## 2018-05-23 ENCOUNTER — TELEPHONE (OUTPATIENT)
Dept: FAMILY MEDICINE CLINIC | Facility: CLINIC | Age: 78
End: 2018-05-23

## 2018-05-23 DIAGNOSIS — Z12.39 SCREENING FOR BREAST CANCER: Primary | ICD-10-CM

## 2018-05-23 DIAGNOSIS — Z12.31 ENCOUNTER FOR SCREENING MAMMOGRAM FOR MALIGNANT NEOPLASM OF BREAST: ICD-10-CM

## 2018-05-29 ENCOUNTER — HOSPITAL ENCOUNTER (OUTPATIENT)
Dept: RADIOLOGY | Age: 78
Discharge: HOME | End: 2018-05-29
Payer: COMMERCIAL

## 2018-05-29 ENCOUNTER — TRANSCRIBE ORDERS (OUTPATIENT)
Dept: ADMINISTRATIVE | Age: 78
End: 2018-05-29

## 2018-05-29 DIAGNOSIS — Z12.39 SCREENING FOR BREAST CANCER: ICD-10-CM

## 2018-05-29 PROCEDURE — 77067 SCR MAMMO BI INCL CAD: CPT

## 2018-06-08 ENCOUNTER — OFFICE VISIT (OUTPATIENT)
Dept: OBGYN CLINIC | Facility: MEDICAL CENTER | Age: 78
End: 2018-06-08
Payer: COMMERCIAL

## 2018-06-08 VITALS
DIASTOLIC BLOOD PRESSURE: 80 MMHG | WEIGHT: 215 LBS | BODY MASS INDEX: 34.55 KG/M2 | HEIGHT: 66 IN | HEART RATE: 65 BPM | SYSTOLIC BLOOD PRESSURE: 138 MMHG

## 2018-06-08 DIAGNOSIS — G89.29 CHRONIC PAIN OF RIGHT KNEE: Primary | ICD-10-CM

## 2018-06-08 DIAGNOSIS — M17.11 PRIMARY OSTEOARTHRITIS OF RIGHT KNEE: ICD-10-CM

## 2018-06-08 DIAGNOSIS — M25.561 CHRONIC PAIN OF RIGHT KNEE: Primary | ICD-10-CM

## 2018-06-08 PROCEDURE — 20610 DRAIN/INJ JOINT/BURSA W/O US: CPT | Performed by: ORTHOPAEDIC SURGERY

## 2018-06-08 RX ORDER — HYALURONATE SODIUM 10 MG/ML
20 SYRINGE (ML) INTRAARTICULAR
Status: COMPLETED | OUTPATIENT
Start: 2018-06-08 | End: 2018-06-08

## 2018-06-08 RX ADMIN — Medication 20 MG: at 13:19

## 2018-06-08 NOTE — PROGRESS NOTES
78 y o female presents today for Euflexxa #1 to her right knee for ongoing treatment of her OA  She has had cortisone injections previously with various relief  This is her first round of visco       Review of Systems  Review of systems negative unless otherwise specified in HPI    Past Medical History  No past medical history on file  Past Surgical History  Past Surgical History:   Procedure Laterality Date    ABOVE KNEE LEG AMPUTATION      ANKLE SURGERY      x2    APPENDECTOMY      CHOLECYSTECTOMY      laparoscopic    GALLBLADDER SURGERY      HYSTERECTOMY Bilateral     total abdominal with removal of both ovaries    INCISION TENDON SHEATH HAND      of a finger    NEUROPLASTY / TRANSPOSITION MEDIAN NERVE AT CARPAL TUNNEL      TUBAL LIGATION         Current Medications  Current Outpatient Prescriptions on File Prior to Visit   Medication Sig Dispense Refill    Aspirin (ASPIR-81 PO) Aspir-81 81 MG Oral Tablet Delayed Release  TAKE 1 TABLET (81 MG TOTAL) BY MOUTH DAILY     Refills: 0       Nallely Na DO;  Started 27-May-2016  Active      Biotin 1 MG CAPS Biotin 1 MG Oral Capsule   Refills: 0       Nallely Na DO;  Started 27-May-2016  Active      Coral Calcium 1000 (390 Ca) MG TABS Coral Calcium 500 MG Oral Tablet   Refills: 0       Everett Na DO;  Started 27-May-2016  Active      docusate sodium (COLACE) 100 mg capsule Take by mouth 3 (three) times a day      doxylamine (UNISON) 25 MG tablet Sleep Aid 25 MG Oral Tablet   Refills: 0       Nallely Na DO;  Started 27-May-2016  Active      losartan (COZAAR) 25 mg tablet TAKE 1 TABLET DAILY 90 tablet 1    metoprolol succinate (TOPROL-XL) 50 mg 24 hr tablet Take 1 tablet by mouth daily      montelukast (SINGULAIR) 10 mg tablet TAKE 1 TABLET DAILY AS DIRECTED 90 tablet 1    Multiple Vitamins-Minerals (MULTIVITAMIN ADULT PO) Take by mouth      Omega-3 1000 MG CAPS Take by mouth      oxybutynin (DITROPAN-XL) 10 MG 24 hr tablet Take 1 tablet by mouth      Psyllium (METAMUCIL FIBER PO) Take by mouth      Red Yeast Rice 600 MG CAPS Take by mouth      levothyroxine 112 mcg tablet Take 1 tablet (112 mcg total) by mouth daily for 90 days 90 tablet 1     No current facility-administered medications on file prior to visit  Recent Labs [facility])    0  Lab Value Date/Time   HCT 47 7 (H) 09/16/2017 1157   HGB 16 2 (H) 09/16/2017 1157   WBC 17 40 (H) 09/16/2017 1157   ESR 7 09/16/2017 1157   GLUCOSE 137 09/16/2017 1157         Physical exam  · General: Awake, Alert, Oriented  · Eyes: Pupils equal, round and reactive to light  · Heart: regular rate and rhythm  · Lungs: No audible wheezing  · Abdomen: soft    Right Knee:  Skin intact, mild varus alignment  TTP medial joint line  WNL ROM moderate patellar crepitus  Mild swelling without effusion  stable to varus/valgus stress  NVID        Imaging  None indicated    Procedure  Large joint arthrocentesis  Date/Time: 6/8/2018 1:19 PM  Consent given by: patient  Site marked: site marked  Supporting Documentation  Indications: pain   Procedure Details  Location: knee - R knee  Preparation: Patient was prepped and draped in the usual sterile fashion  Needle size: 22 G  Ultrasound guidance: no  Medications administered: 20 mg Sodium Hyaluronate 20 MG/2ML              Assessment/Plan:   70 y  o female right knee OA and pain  She has had cortisone injections in the past with varying relief this is her first round of visco   Euflexxa #1 administered to her right knee today    ICE as needed  WBAT  Activities as tolerated  Follow-up each of the next 2 weeks to complete the series

## 2018-06-15 ENCOUNTER — OFFICE VISIT (OUTPATIENT)
Dept: OBGYN CLINIC | Facility: MEDICAL CENTER | Age: 78
End: 2018-06-15
Payer: COMMERCIAL

## 2018-06-15 VITALS
HEART RATE: 74 BPM | HEIGHT: 66 IN | BODY MASS INDEX: 34.55 KG/M2 | DIASTOLIC BLOOD PRESSURE: 81 MMHG | SYSTOLIC BLOOD PRESSURE: 141 MMHG | WEIGHT: 214.95 LBS

## 2018-06-15 DIAGNOSIS — M17.11 PRIMARY OSTEOARTHRITIS OF RIGHT KNEE: Primary | ICD-10-CM

## 2018-06-15 PROCEDURE — 20610 DRAIN/INJ JOINT/BURSA W/O US: CPT | Performed by: ORTHOPAEDIC SURGERY

## 2018-06-15 RX ORDER — HYALURONATE SODIUM 10 MG/ML
20 SYRINGE (ML) INTRAARTICULAR
Status: COMPLETED | OUTPATIENT
Start: 2018-06-15 | End: 2018-06-15

## 2018-06-15 RX ADMIN — Medication 20 MG: at 12:58

## 2018-06-15 RX ADMIN — Medication 20 MG: at 14:53

## 2018-06-15 NOTE — PROGRESS NOTES
77-year-old female here for ongoing viscosupplementation to the right knee  Exam confirms neither erythema or effusion  Assessment/plan:  77-year-old female with osteoarthritis of the right knee that requires ongoing viscosupplementation  It is advised, except, administers outlined above   I would welcome the opportunity see back in the office next week for ongoing viscosupplementation of the right knee  Large joint arthrocentesis  Date/Time: 6/15/2018 2:53 PM  Consent given by: patient  Supporting Documentation  Indications: pain   Procedure Details  Location: knee - R knee  Approach: anteromedial  Medications administered: 20 mg Sodium Hyaluronate 20 MG/2ML    Patient tolerance: patient tolerated the procedure well with no immediate complications  Dressing:  Sterile dressing applied

## 2018-06-15 NOTE — PROGRESS NOTES
1  Primary osteoarthritis of right knee  Large joint arthrocentesis     Patient is here for her 2nd injection of Euflexxa  into the right knee  Patient reports no problems or improvement from the last injection  All organ systems normal  Physical exam of the knee shows no effusion no ecchymosis        Large joint arthrocentesis  Date/Time: 6/15/2018 12:58 PM  Consent given by: patient  Site marked: site marked  Procedure Details  Location: knee - R knee  Ultrasound guidance: no  Medications administered: 20 mg Sodium Hyaluronate 20 MG/2ML  Specialty Pharmacy Supplied: received medications from pharmacy  Patient tolerance: patient tolerated the procedure well with no immediate complications  Dressing:  Sterile dressing applied        Patient tolerated procedure follow up in one week for third injection of Euflexxa

## 2018-06-22 ENCOUNTER — OFFICE VISIT (OUTPATIENT)
Dept: OBGYN CLINIC | Facility: MEDICAL CENTER | Age: 78
End: 2018-06-22
Payer: COMMERCIAL

## 2018-06-22 VITALS
HEIGHT: 66 IN | SYSTOLIC BLOOD PRESSURE: 132 MMHG | DIASTOLIC BLOOD PRESSURE: 81 MMHG | BODY MASS INDEX: 34.55 KG/M2 | HEART RATE: 61 BPM | WEIGHT: 214.95 LBS

## 2018-06-22 DIAGNOSIS — M25.561 CHRONIC PAIN OF RIGHT KNEE: Primary | ICD-10-CM

## 2018-06-22 DIAGNOSIS — M17.11 PRIMARY OSTEOARTHRITIS OF RIGHT KNEE: ICD-10-CM

## 2018-06-22 DIAGNOSIS — G89.29 CHRONIC PAIN OF RIGHT KNEE: Primary | ICD-10-CM

## 2018-06-22 PROCEDURE — 20610 DRAIN/INJ JOINT/BURSA W/O US: CPT | Performed by: ORTHOPAEDIC SURGERY

## 2018-06-22 RX ORDER — HYALURONATE SODIUM 10 MG/ML
20 SYRINGE (ML) INTRAARTICULAR
Status: COMPLETED | OUTPATIENT
Start: 2018-06-22 | End: 2018-06-22

## 2018-06-22 RX ORDER — LEVOTHYROXINE SODIUM 112 UG/1
112 TABLET ORAL DAILY
COMMUNITY
End: 2019-05-08

## 2018-06-22 RX ORDER — LIDOCAINE HYDROCHLORIDE 10 MG/ML
5 INJECTION, SOLUTION INFILTRATION; PERINEURAL
Status: COMPLETED | OUTPATIENT
Start: 2018-06-22 | End: 2018-06-22

## 2018-06-22 RX ADMIN — LIDOCAINE HYDROCHLORIDE 5 ML: 10 INJECTION, SOLUTION INFILTRATION; PERINEURAL at 10:21

## 2018-06-22 RX ADMIN — Medication 20 MG: at 10:21

## 2018-06-22 NOTE — PROGRESS NOTES
78 y o female with known right knee OA and is here today for her 3rd Euflexxa injection to her right knee  She has had varying relief thus far with her series  Review of Systems  Review of systems negative unless otherwise specified in HPI    Past Medical History  No past medical history on file  Past Surgical History  Past Surgical History:   Procedure Laterality Date    ABOVE KNEE LEG AMPUTATION      ANKLE SURGERY      x2    APPENDECTOMY      CHOLECYSTECTOMY      laparoscopic    GALLBLADDER SURGERY      HYSTERECTOMY Bilateral     total abdominal with removal of both ovaries    INCISION TENDON SHEATH HAND      of a finger    NEUROPLASTY / TRANSPOSITION MEDIAN NERVE AT CARPAL TUNNEL      TUBAL LIGATION         Current Medications  Current Outpatient Prescriptions on File Prior to Visit   Medication Sig Dispense Refill    Aspirin (ASPIR-81 PO) Aspir-81 81 MG Oral Tablet Delayed Release  TAKE 1 TABLET (81 MG TOTAL) BY MOUTH DAILY     Refills: 0       Birder  DO;  Started 27-May-2016  Active      Biotin 1 MG CAPS Biotin 1 MG Oral Capsule   Refills: 0       Birder  DO;  Started 27-May-2016  Active      Coral Calcium 1000 (390 Ca) MG TABS Coral Calcium 500 MG Oral Tablet   Refills: 0       Birder  DO;  Started 27-May-2016  Active      docusate sodium (COLACE) 100 mg capsule Take by mouth 3 (three) times a day      doxylamine (UNISON) 25 MG tablet Sleep Aid 25 MG Oral Tablet   Refills: 0       Birder  DO;  Started 27-May-2016  Active      losartan (COZAAR) 25 mg tablet TAKE 1 TABLET DAILY 90 tablet 1    metoprolol succinate (TOPROL-XL) 50 mg 24 hr tablet Take 1 tablet by mouth daily      montelukast (SINGULAIR) 10 mg tablet TAKE 1 TABLET DAILY AS DIRECTED 90 tablet 1    Multiple Vitamins-Minerals (MULTIVITAMIN ADULT PO) Take by mouth      Omega-3 1000 MG CAPS Take by mouth      oxybutynin (DITROPAN-XL) 10 MG 24 hr tablet Take 1 tablet by mouth      Psyllium (METAMUCIL FIBER PO) Take by mouth      Red Yeast Rice 600 MG CAPS Take by mouth      [DISCONTINUED] levothyroxine 112 mcg tablet Take 1 tablet (112 mcg total) by mouth daily for 90 days 90 tablet 1     No current facility-administered medications on file prior to visit  Recent Labs [facility])    0  Lab Value Date/Time   HCT 47 7 (H) 09/16/2017 1157   HGB 16 2 (H) 09/16/2017 1157   WBC 17 40 (H) 09/16/2017 1157   ESR 7 09/16/2017 1157   GLUCOSE 137 09/16/2017 1157         Physical exam  · General: Awake, Alert, Oriented  · Eyes: Pupils equal, round and reactive to light  · Heart: regular rate and rhythm  · Lungs: No audible wheezing  · Abdomen: soft    Right knee:   Skin intact   Good STLT  ROM WNL   No signs of infection  NVID    Imaging  None performed    Procedure  Large joint arthrocentesis  Date/Time: 6/22/2018 10:21 AM  Consent given by: patient  Site marked: site marked  Timeout: Immediately prior to procedure a time out was called to verify the correct patient, procedure, equipment, support staff and site/side marked as required   Supporting Documentation  Indications: pain and diagnostic evaluation   Procedure Details  Location: knee - R knee  Preparation: Patient was prepped and draped in the usual sterile fashion  Needle size: 22 G  Ultrasound guidance: no  Medications administered: 5 mL lidocaine 1 %; 20 mg Sodium Hyaluronate 20 MG/2ML    Patient tolerance: patient tolerated the procedure well with no immediate complications  Dressing:  Sterile dressing applied          Assessment/Plan:   70 y  o female with known right knee OA and chronic pain    Her 3rd and last Euflexxa injection performed today  ICE as indicated  Post-injection protocol advised   activities as tolerated  WBAT  Re-check in 3 months to assess the efficacy of her visco injections

## 2018-07-17 ENCOUNTER — OFFICE VISIT (OUTPATIENT)
Dept: FAMILY MEDICINE CLINIC | Facility: CLINIC | Age: 78
End: 2018-07-17
Payer: COMMERCIAL

## 2018-07-17 ENCOUNTER — APPOINTMENT (OUTPATIENT)
Dept: RADIOLOGY | Facility: MEDICAL CENTER | Age: 78
End: 2018-07-17
Payer: COMMERCIAL

## 2018-07-17 VITALS
DIASTOLIC BLOOD PRESSURE: 58 MMHG | OXYGEN SATURATION: 95 % | TEMPERATURE: 98.1 F | HEART RATE: 73 BPM | SYSTOLIC BLOOD PRESSURE: 104 MMHG | WEIGHT: 227 LBS | HEIGHT: 66 IN | RESPIRATION RATE: 16 BRPM | BODY MASS INDEX: 36.48 KG/M2

## 2018-07-17 DIAGNOSIS — M25.531 WRIST PAIN, ACUTE, RIGHT: Primary | ICD-10-CM

## 2018-07-17 DIAGNOSIS — M25.531 WRIST PAIN, ACUTE, RIGHT: ICD-10-CM

## 2018-07-17 PROCEDURE — 99213 OFFICE O/P EST LOW 20 MIN: CPT | Performed by: INTERNAL MEDICINE

## 2018-07-17 PROCEDURE — 73110 X-RAY EXAM OF WRIST: CPT

## 2018-07-17 RX ORDER — NABUMETONE 750 MG/1
750 TABLET, FILM COATED ORAL 2 TIMES DAILY
Qty: 30 TABLET | Refills: 1 | Status: SHIPPED | OUTPATIENT
Start: 2018-07-17 | End: 2018-11-08

## 2018-07-17 NOTE — PROGRESS NOTES
Assessment/Plan:         Diagnoses and all orders for this visit:    Wrist pain, acute, right  -     XR wrist 3+ vw right; Future  -     nabumetone (RELAFEN) 750 mg tablet; Take 1 tablet (750 mg total) by mouth 2 (two) times a day          Subjective:      Patient ID: Raciel Elena is a 70 y o  [place]  The following portions of the patient's history were reviewed and updated as appropriate:   She  has a past medical history of Seasonal allergies  She   Patient Active Problem List    Diagnosis Date Noted    Primary osteoarthritis of right knee 06/08/2018    Chronic pain of right knee 06/08/2018    Hypertension 01/24/2018    Hypothyroidism 01/24/2018    Overactive bladder 01/24/2018    Palpitations 01/24/2018    EKG, abnormal 01/24/2018     She  has a past surgical history that includes Gallbladder surgery; Appendectomy; Hysterectomy (Bilateral); Ankle surgery; Cholecystectomy; Incision tendon sheath hand; Neuroplasty / transposition median nerve at carpal tunnel; and Tubal ligation  Her family history includes Heart disease in her father and mother; Prostate cancer in her father  She  reports that she has never smoked  She has never used smokeless tobacco  She reports that she drinks alcohol  She reports that she does not use drugs  Current Outpatient Prescriptions   Medication Sig Dispense Refill    Aspirin (ASPIR-81 PO) Aspir-81 81 MG Oral Tablet Delayed Release  TAKE 1 TABLET (81 MG TOTAL) BY MOUTH DAILY     Refills: 0       Eric Ramos DO;  Started 27-May-2016  Active      Biotin 1 MG CAPS Biotin 1 MG Oral Capsule   Refills: 0       Eric Ramos DO;  Started 27-May-2016  Active      Coral Calcium 1000 (390 Ca) MG TABS Coral Calcium 500 MG Oral Tablet   Refills: 0       Eric Ramos DO;  Started 27-May-2016  Active      docusate sodium (COLACE) 100 mg capsule Take by mouth 3 (three) times a day      doxylamine (UNISON) 25 MG tablet Sleep Aid 25 MG Oral Tablet   Refills: 0       Jed Salinasman DO;  Started 27-May-2016  Active      levothyroxine 112 mcg tablet Take 112 mcg by mouth daily      losartan (COZAAR) 25 mg tablet TAKE 1 TABLET DAILY 90 tablet 1    metoprolol succinate (TOPROL-XL) 50 mg 24 hr tablet Take 1 tablet by mouth daily      montelukast (SINGULAIR) 10 mg tablet TAKE 1 TABLET DAILY AS DIRECTED 90 tablet 1    Multiple Vitamins-Minerals (MULTIVITAMIN ADULT PO) Take by mouth      Omega-3 1000 MG CAPS Take by mouth      oxybutynin (DITROPAN-XL) 10 MG 24 hr tablet Take 1 tablet by mouth      Psyllium (METAMUCIL FIBER PO) Take by mouth      Red Yeast Rice 600 MG CAPS Take by mouth      nabumetone (RELAFEN) 750 mg tablet Take 1 tablet (750 mg total) by mouth 2 (two) times a day 30 tablet 1     No current facility-administered medications for this visit  Current Outpatient Prescriptions on File Prior to Visit   Medication Sig    Aspirin (ASPIR-81 PO) Aspir-81 81 MG Oral Tablet Delayed Release  TAKE 1 TABLET (81 MG TOTAL) BY MOUTH DAILY     Refills: 0       Jed Ovalle DO;  Started 27-May-2016  Active    Biotin 1 MG CAPS Biotin 1 MG Oral Capsule   Refills: 0       Jed Ovalle DO;  Started 27-May-2016  Active    Coral Calcium 1000 (390 Ca) MG TABS Coral Calcium 500 MG Oral Tablet   Refills: 0       Jed Ovalle DO;  Started 27-May-2016  Active    docusate sodium (COLACE) 100 mg capsule Take by mouth 3 (three) times a day    doxylamine (UNISON) 25 MG tablet Sleep Aid 25 MG Oral Tablet   Refills: 0       Jed Ovalle DO;  Started 27-May-2016  Active    levothyroxine 112 mcg tablet Take 112 mcg by mouth daily    losartan (COZAAR) 25 mg tablet TAKE 1 TABLET DAILY    metoprolol succinate (TOPROL-XL) 50 mg 24 hr tablet Take 1 tablet by mouth daily    montelukast (SINGULAIR) 10 mg tablet TAKE 1 TABLET DAILY AS DIRECTED    Multiple Vitamins-Minerals (MULTIVITAMIN ADULT PO) Take by mouth    Omega-3 1000 MG CAPS Take by mouth    oxybutynin (DITROPAN-XL) 10 MG 24 hr tablet Take 1 tablet by mouth    Psyllium (METAMUCIL FIBER PO) Take by mouth    Red Yeast Rice 600 MG CAPS Take by mouth     No current facility-administered medications on file prior to visit  She is allergic to prednisone and vicodin [hydrocodone-acetaminophen]       Review of Systems   Constitutional: Negative  HENT: Negative  Respiratory: Negative  Cardiovascular: Negative  Musculoskeletal: Positive for arthralgias and myalgias  Objective:      /58 (BP Location: Left arm, Patient Position: Sitting, Cuff Size: Large)   Pulse 73   Temp 98 1 °F (36 7 °C) (Tympanic)   Resp 16   Ht 5' 5 98" (1 676 m)   Wt 103 kg (227 lb)   SpO2 95%   BMI 36 66 kg/m²          Physical Exam   Constitutional: She appears well-developed and well-nourished  HENT:   Head: Normocephalic and atraumatic  Neck: Normal range of motion  Neck supple  Cardiovascular: Normal rate, regular rhythm and normal heart sounds  Pulmonary/Chest: Effort normal and breath sounds normal    Abdominal: Soft   Bowel sounds are normal

## 2018-08-06 ENCOUNTER — OFFICE VISIT (OUTPATIENT)
Dept: OBGYN CLINIC | Facility: MEDICAL CENTER | Age: 78
End: 2018-08-06
Payer: COMMERCIAL

## 2018-08-06 VITALS — HEIGHT: 66 IN | WEIGHT: 225 LBS | BODY MASS INDEX: 36.16 KG/M2

## 2018-08-06 DIAGNOSIS — M65.4 DE QUERVAIN'S TENOSYNOVITIS, RIGHT: Primary | ICD-10-CM

## 2018-08-06 PROCEDURE — 99213 OFFICE O/P EST LOW 20 MIN: CPT | Performed by: ORTHOPAEDIC SURGERY

## 2018-08-06 PROCEDURE — 20550 NJX 1 TENDON SHEATH/LIGAMENT: CPT | Performed by: ORTHOPAEDIC SURGERY

## 2018-08-06 RX ADMIN — TRIAMCINOLONE ACETONIDE 20 MG: 40 INJECTION, SUSPENSION INTRA-ARTICULAR; INTRAMUSCULAR at 15:14

## 2018-08-06 RX ADMIN — LIDOCAINE HYDROCHLORIDE 0.5 ML: 10 INJECTION, SOLUTION INFILTRATION; PERINEURAL at 15:14

## 2018-08-06 NOTE — PROGRESS NOTES
CHIEF COMPLAINT:  Chief Complaint   Patient presents with    Right Wrist - Pain       SUBJECTIVE:  Kaleigh Rucker is a 70y o  year old RHD female who presents to the office with pain in her right wrist  Pt states that the pain radiates from her wrist up her forearm  Pt denies numbness and tingling except for in her thumb when she sews, although this has been ongoing for some time  Pt states that it gets worse with activity and wakes her at night  Pt states that some activities cause sharp shooting pain  Pt states that she takes sleeve for pain with temporary moderate relief  Pt states that she had a twisting injury aprox 7/10/18  Pt denies any other injuries to this wrist        PAST MEDICAL HISTORY:  Past Medical History:   Diagnosis Date    Seasonal allergies        PAST SURGICAL HISTORY:  Past Surgical History:   Procedure Laterality Date    ANKLE SURGERY      x2    APPENDECTOMY      CHOLECYSTECTOMY      laparoscopic    GALLBLADDER SURGERY      HYSTERECTOMY Bilateral     total abdominal with removal of both ovaries    INCISION TENDON SHEATH HAND      of a finger    NEUROPLASTY / TRANSPOSITION MEDIAN NERVE AT CARPAL TUNNEL      TUBAL LIGATION         FAMILY HISTORY:  Family History   Problem Relation Age of Onset    Heart disease Mother         endocarditis    Heart disease Father     Prostate cancer Father        SOCIAL HISTORY:  Social History   Substance Use Topics    Smoking status: Never Smoker    Smokeless tobacco: Never Used    Alcohol use Yes      Comment: wine with dinner; occasional use as per [employer]       MEDICATIONS:    Current Outpatient Prescriptions:     Aspirin (ASPIR-81 PO), Aspir-81 81 MG Oral Tablet Delayed Release TAKE 1 TABLET (81 MG TOTAL) BY MOUTH DAILY    Refills: 0    Hamzah Neat DO;  Started 27-May-2016 Active, Disp: , Rfl:     Biotin 1 MG CAPS, Biotin 1 MG Oral Capsule  Refills: 0    Hamzah Neat DO;  Started 27-May-2016 Active, Disp: , Rfl:     Coral Calcium 1000 (390 Ca) MG TABS, Coral Calcium 500 MG Oral Tablet  Refills: 0    Angeles Mode DO;  Started 27-May-2016 Active, Disp: , Rfl:     docusate sodium (COLACE) 100 mg capsule, Take by mouth 3 (three) times a day, Disp: , Rfl:     doxylamine (UNISON) 25 MG tablet, Sleep Aid 25 MG Oral Tablet  Refills: 0    Angeles Mode DO;  Started 27-May-2016 Active, Disp: , Rfl:     levothyroxine 112 mcg tablet, Take 112 mcg by mouth daily, Disp: , Rfl:     losartan (COZAAR) 25 mg tablet, TAKE 1 TABLET DAILY, Disp: 90 tablet, Rfl: 1    metoprolol succinate (TOPROL-XL) 50 mg 24 hr tablet, Take 1 tablet by mouth daily, Disp: , Rfl:     montelukast (SINGULAIR) 10 mg tablet, TAKE 1 TABLET DAILY AS DIRECTED, Disp: 90 tablet, Rfl: 1    Multiple Vitamins-Minerals (MULTIVITAMIN ADULT PO), Take by mouth, Disp: , Rfl:     nabumetone (RELAFEN) 750 mg tablet, Take 1 tablet (750 mg total) by mouth 2 (two) times a day, Disp: 30 tablet, Rfl: 1    Omega-3 1000 MG CAPS, Take by mouth, Disp: , Rfl:     oxybutynin (DITROPAN-XL) 10 MG 24 hr tablet, Take 1 tablet by mouth, Disp: , Rfl:     Psyllium (METAMUCIL FIBER PO), Take by mouth, Disp: , Rfl:     Red Yeast Rice 600 MG CAPS, Take by mouth, Disp: , Rfl:     ALLERGIES:  Allergies   Allergen Reactions    Prednisone Tachycardia    Vicodin [Hydrocodone-Acetaminophen] Lightheadedness       REVIEW OF SYSTEMS:  Review of Systems   Constitutional: Negative for chills, fever and unexpected weight change  HENT: Negative for hearing loss, nosebleeds and sore throat  Eyes: Negative for pain, redness and visual disturbance  Respiratory: Negative for cough, shortness of breath and wheezing  Cardiovascular: Negative for chest pain, palpitations and leg swelling  Gastrointestinal: Negative for abdominal pain, nausea and vomiting  Endocrine: Negative for polydipsia and polyuria  Genitourinary: Negative for dysuria and hematuria     Musculoskeletal: Negative for arthralgias, joint swelling and myalgias  Skin: Negative for rash and wound  Neurological: Negative for dizziness, numbness and headaches  Psychiatric/Behavioral: Negative for decreased concentration, dysphoric mood and suicidal ideas  The patient is not nervous/anxious          VITALS:  Vitals:       LABS:  HgA1c: No results found for: HGBA1C  BMP:   Lab Results   Component Value Date    GLUCOSE 137 09/16/2017    CALCIUM 9 6 09/16/2017     09/16/2017    K 5 0 09/16/2017    CO2 21 09/16/2017     09/16/2017    BUN 21 09/16/2017    CREATININE 1 04 09/16/2017       _____________________________________________________  PHYSICAL EXAMINATION:  General: well developed and well nourished, alert, oriented times 3 and appears comfortable  Psychiatric: Normal  HEENT: Trachea Midline, No torticollis  Pulmonary: No audible wheezing or respiratory distress   Skin: No masses, erthema, lacerations, fluctation, ulcerations  Neurovascular: Sensation Intact to the Median, Ulnar, Radial Nerve, Motor Intact to the Median, Ulnar, Radial Nerve and Pulses Intact    MUSCULOSKELETAL EXAMINATION:    De Quervain's Tenosynovitis Exam right thumb    Positive tender to palpation over 1st dorsal extensor compartment   Positive crepitus over 1st dorsal extensor compartment   Positive Finkelstein's test    Positive pain with resisted abduction of the thumb    ___________________________________________________  STUDIES REVIEWED:  I personally reviewed the following images of 3v right wrist and my interpretation is arthritis present in the right thumb CMC joint      PROCEDURES PERFORMED:  Hand/upper extremity injection  Date/Time: 8/6/2018 3:14 PM  Consent given by: patient  Supporting Documentation  Indications: pain and tendon swelling   Procedure Details  Condition:de Quervain's tenosynovitis Site: R extensor compartment 1   Medications administered: 0 5 mL lidocaine 1 %; 20 mg triamcinolone acetonide 40 mg/mL  Patient tolerance: patient tolerated the procedure well with no immediate complications  Dressing:  Sterile dressing applied       No Procedures performed today    _____________________________________________________  ASSESSMENT/PLAN:    Assessment:   right de Quervain stenosis tenosynovitis   Right thumb CMC OA    Plan:   CSI administered today  Pt will call office with any questions or concerns     There are no diagnoses linked to this encounter  Follow Up:  Return in about 2 weeks (around 8/20/2018)  To Do Next Visit:  Re-evaluation of current issue    General Discussions:  Mikayla Martinez Tenosynovitis: The anatomy and physiology of de Quervain's tenosynovitis was discussed with the patient today in the office  Edema and increased contact pressure within the first dorsal extensor compartment at the radial styloid can cause pain, crepitation, and limitation of function  Treatment options include resting thumb spica splints to decrease edema, oral anti-inflammatory medications, home or formal therapy exercises, up to 2 steroid injections within the first dorsal extensor compartment, or surgical release  While majority of patients do respond to conservative treatment, up to 20% may require surgical release           Scribe Attestation    I,:   Jesus Hubbard am acting as a scribe while in the presence of the attending physician :        I,:   Gregoria Salazar MD personally performed the services described in this documentation    as scribed in my presence :

## 2018-08-07 RX ORDER — TRIAMCINOLONE ACETONIDE 40 MG/ML
20 INJECTION, SUSPENSION INTRA-ARTICULAR; INTRAMUSCULAR
Status: COMPLETED | OUTPATIENT
Start: 2018-08-06 | End: 2018-08-06

## 2018-08-07 RX ORDER — LIDOCAINE HYDROCHLORIDE 10 MG/ML
0.5 INJECTION, SOLUTION INFILTRATION; PERINEURAL
Status: COMPLETED | OUTPATIENT
Start: 2018-08-06 | End: 2018-08-06

## 2018-08-20 ENCOUNTER — OFFICE VISIT (OUTPATIENT)
Dept: OBGYN CLINIC | Facility: MEDICAL CENTER | Age: 78
End: 2018-08-20
Payer: COMMERCIAL

## 2018-08-20 VITALS — HEIGHT: 66 IN | WEIGHT: 225 LBS | BODY MASS INDEX: 36.16 KG/M2

## 2018-08-20 DIAGNOSIS — M65.4 DE QUERVAIN'S TENOSYNOVITIS, RIGHT: ICD-10-CM

## 2018-08-20 DIAGNOSIS — M18.11 ARTHRITIS OF CARPOMETACARPAL (CMC) JOINT OF RIGHT THUMB: Primary | ICD-10-CM

## 2018-08-20 PROCEDURE — 99213 OFFICE O/P EST LOW 20 MIN: CPT | Performed by: ORTHOPAEDIC SURGERY

## 2018-08-20 PROCEDURE — 20600 DRAIN/INJ JOINT/BURSA W/O US: CPT | Performed by: ORTHOPAEDIC SURGERY

## 2018-08-20 RX ADMIN — LIDOCAINE HYDROCHLORIDE 0.5 ML: 10 INJECTION, SOLUTION INFILTRATION; PERINEURAL at 14:02

## 2018-08-20 RX ADMIN — Medication 0.05 MEQ: at 14:02

## 2018-08-20 RX ADMIN — TRIAMCINOLONE ACETONIDE 20 MG: 40 INJECTION, SUSPENSION INTRA-ARTICULAR; INTRAMUSCULAR at 14:02

## 2018-08-20 RX ADMIN — LIDOCAINE HYDROCHLORIDE 2 ML: 10 INJECTION, SOLUTION INFILTRATION; PERINEURAL at 14:02

## 2018-08-20 NOTE — PROGRESS NOTES
CHIEF COMPLAINT:  Chief Complaint   Patient presents with    Right Wrist - Follow-up       SUBJECTIVE:  Susanna Ruiz is a 70y o  year old RHD female who presents to the office for a follow up apt 2 weeks S/P CSI for her right sided De Quervain tenosynovitis  Pt states that the pain in her wrist is 100% better, but still has pain in her right thumb  Pt states that the pain in her thumb is mildly improved  Pt states that activity and the grabbing motion increase her pain  Pt states that she takes aleve for pain with moderate relief  PAST MEDICAL HISTORY:  Past Medical History:   Diagnosis Date    Seasonal allergies        PAST SURGICAL HISTORY:  Past Surgical History:   Procedure Laterality Date    ANKLE SURGERY      x2    APPENDECTOMY      CHOLECYSTECTOMY      laparoscopic    GALLBLADDER SURGERY      HYSTERECTOMY Bilateral     total abdominal with removal of both ovaries    INCISION TENDON SHEATH HAND      of a finger    NEUROPLASTY / TRANSPOSITION MEDIAN NERVE AT CARPAL TUNNEL      TUBAL LIGATION         FAMILY HISTORY:  Family History   Problem Relation Age of Onset    Heart disease Mother         endocarditis    Heart disease Father     Prostate cancer Father        SOCIAL HISTORY:  Social History   Substance Use Topics    Smoking status: Never Smoker    Smokeless tobacco: Never Used    Alcohol use Yes      Comment: wine with dinner; occasional use as per [employer]       MEDICATIONS:    Current Outpatient Prescriptions:     Aspirin (ASPIR-81 PO), Aspir-81 81 MG Oral Tablet Delayed Release TAKE 1 TABLET (81 MG TOTAL) BY MOUTH DAILY    Refills: 0    Adam Cecille DO;  Started 27-May-2016 Active, Disp: , Rfl:     Biotin 1 MG CAPS, Biotin 1 MG Oral Capsule  Refills: 0    Adam Cecille DO;  Started 27-May-2016 Active, Disp: , Rfl:     Coral Calcium 1000 (390 Ca) MG TABS, Coral Calcium 500 MG Oral Tablet  Refills: 0    Adam Cecille DO;  Started 27-May-2016 Active, Disp: , Rfl:    docusate sodium (COLACE) 100 mg capsule, Take by mouth 3 (three) times a day, Disp: , Rfl:     doxylamine (UNISON) 25 MG tablet, Sleep Aid 25 MG Oral Tablet  Refills: 0    Joaquin Perez DO;  Started 27-May-2016 Active, Disp: , Rfl:     levothyroxine 112 mcg tablet, Take 112 mcg by mouth daily, Disp: , Rfl:     losartan (COZAAR) 25 mg tablet, TAKE 1 TABLET DAILY, Disp: 90 tablet, Rfl: 1    metoprolol succinate (TOPROL-XL) 50 mg 24 hr tablet, Take 1 tablet by mouth daily, Disp: , Rfl:     montelukast (SINGULAIR) 10 mg tablet, TAKE 1 TABLET DAILY AS DIRECTED, Disp: 90 tablet, Rfl: 1    Multiple Vitamins-Minerals (MULTIVITAMIN ADULT PO), Take by mouth, Disp: , Rfl:     nabumetone (RELAFEN) 750 mg tablet, Take 1 tablet (750 mg total) by mouth 2 (two) times a day, Disp: 30 tablet, Rfl: 1    Omega-3 1000 MG CAPS, Take by mouth, Disp: , Rfl:     oxybutynin (DITROPAN-XL) 10 MG 24 hr tablet, Take 1 tablet by mouth, Disp: , Rfl:     Psyllium (METAMUCIL FIBER PO), Take by mouth, Disp: , Rfl:     Red Yeast Rice 600 MG CAPS, Take by mouth, Disp: , Rfl:     ALLERGIES:  Allergies   Allergen Reactions    Prednisone Tachycardia    Vicodin [Hydrocodone-Acetaminophen] Lightheadedness       REVIEW OF SYSTEMS:  Review of Systems   Constitutional: Negative for chills, fever and unexpected weight change  HENT: Negative for hearing loss, nosebleeds and sore throat  Eyes: Negative for pain, redness and visual disturbance  Respiratory: Negative for cough, shortness of breath and wheezing  Cardiovascular: Negative for chest pain, palpitations and leg swelling  Gastrointestinal: Negative for abdominal pain, nausea and vomiting  Endocrine: Negative for polydipsia and polyuria  Genitourinary: Negative for dysuria and hematuria  Musculoskeletal: Negative for arthralgias, joint swelling and myalgias  Skin: Negative for rash and wound  Neurological: Negative for dizziness, numbness and headaches  Psychiatric/Behavioral: Negative for decreased concentration, dysphoric mood and suicidal ideas  The patient is not nervous/anxious          VITALS:  Vitals:       LABS:  HgA1c: No results found for: HGBA1C  BMP:   Lab Results   Component Value Date    GLUCOSE 137 09/16/2017    CALCIUM 9 6 09/16/2017     09/16/2017    K 5 0 09/16/2017    CO2 21 09/16/2017     09/16/2017    BUN 21 09/16/2017    CREATININE 1 04 09/16/2017       _____________________________________________________  PHYSICAL EXAMINATION:  General: well developed and well nourished, alert, oriented times 3 and appears comfortable  Psychiatric: Normal  HEENT: Trachea Midline, No torticollis  Pulmonary: No audible wheezing or respiratory distress   Skin: No masses, erthema, lacerations, fluctation, ulcerations  Neurovascular: Sensation Intact to the Median, Ulnar, Radial Nerve, Motor Intact to the Median, Ulnar, Radial Nerve and Pulses Intact    MUSCULOSKELETAL EXAMINATION:  CMC Exam: Right  No adduction contracture  No hyperextension deformity of MCP joint  Positive localized tenderness over radial and dorsal aspect of thumb (CMC joint)  Grind test is Positive for pain and Positive for crepitus  No triggering or tenderness over the A1 pulley  No pain with Finkelsteins maneuver     De Quervain's Tenosynovitis Exam Right     Negative tender to palpation over 1st dorsal extensor compartment   Negative crepitus over 1st dorsal extensor compartment   Negative Finkelstein's test    Negative pain with resisted abduction of the thumb      ___________________________________________________  STUDIES REVIEWED:  I personally reviewed the following images of 3 views right hand and my interpretation is CMC arthritis       PROCEDURES PERFORMED:  Small joint arthrocentesis  Date/Time: 8/20/2018 2:02 PM  Consent given by: patient  Timeout: Immediately prior to procedure a time out was called to verify the correct patient, procedure, equipment, support staff and site/side marked as required   Supporting Documentation  Indications: pain   Procedure Details  Location: thumb - R thumb CMC  Needle size: 27 G  Ultrasound guidance: no  Medications administered: 0 05 mEq sodium bicarbonate 8 4 %; 20 mg triamcinolone acetonide 40 mg/mL; 2 mL lidocaine 1 %; 0 5 mL lidocaine 1 %    Patient tolerance: patient tolerated the procedure well with no immediate complications  Dressing:  Sterile dressing applied      No Procedures performed today    _____________________________________________________  ASSESSMENT/PLAN:    S/P Right De Quervain tenosynovitis CSI with complete resolution of symptoms  * Pt will call the office if she has return of symptoms     Right  Thumb CMC Arthritis  *CSI was administered into the right [facility] joint of the thumb today with out complications  Pt will apply ice for the next 1-2 days for increase of pain due to the CSI  Pt will then apply warmth and stretch for discomfort  Pt will follow up in the office in 2 weeks  Pt will call the office if she has any questions or concerns        Follow Up:  Return in about 2 weeks (around 9/3/2018)  To Do Next Visit:  Re-evaluation of current issue    General Discussions:  [facility] Arthritis: The anatomy and physiology of carpometacarpal joint arthritis was discussed with the patient today in the office  Deterioration of the articular cartilage eventually leads to hypermobility at the thumb [facility] joint, resulting in joint subluxation, osteophyte formation, cystic changes within the trapezium and base of the first metacarpal, as well as subchondral sclerosis  Eventually, pain, limited mobility, and compensatory hyperextension at the metacarpophalangeal joint may develop  While normal activity and usage of the thumb joint may provide a painful experience to the patient, this typically does not result in damage to the thumb or hand    Treatment options include resting thumb spica splints to decreased joint edema, pain, and inflammation  Therapy exercises to strengthen the thenar musculature may relieve pain, but do not alter the overall continued development of osteoarthritis  Oral medications, topical medications, corticosteroid injections may decrease pain and increase overall function  Eventually, approximately 5% of patients may require surgical intervention                                                                                                                                                                                                   Scribe Attestation    I,:   Tila Mayfield am acting as a scribe while in the presence of the attending physician :        I,:   Aviva Harrison MD personally performed the services described in this documentation    as scribed in my presence :

## 2018-08-21 DIAGNOSIS — E03.9 HYPOTHYROIDISM, UNSPECIFIED TYPE: ICD-10-CM

## 2018-08-21 RX ORDER — LIDOCAINE HYDROCHLORIDE 10 MG/ML
0.5 INJECTION, SOLUTION INFILTRATION; PERINEURAL
Status: COMPLETED | OUTPATIENT
Start: 2018-08-20 | End: 2018-08-20

## 2018-08-21 RX ORDER — LIDOCAINE HYDROCHLORIDE 10 MG/ML
2 INJECTION, SOLUTION INFILTRATION; PERINEURAL
Status: COMPLETED | OUTPATIENT
Start: 2018-08-20 | End: 2018-08-20

## 2018-08-21 RX ORDER — TRIAMCINOLONE ACETONIDE 40 MG/ML
20 INJECTION, SUSPENSION INTRA-ARTICULAR; INTRAMUSCULAR
Status: COMPLETED | OUTPATIENT
Start: 2018-08-20 | End: 2018-08-20

## 2018-08-22 RX ORDER — LEVOTHYROXINE SODIUM 112 UG/1
TABLET ORAL
Qty: 90 TABLET | Refills: 1 | Status: SHIPPED | OUTPATIENT
Start: 2018-08-22 | End: 2019-02-08

## 2018-09-18 DIAGNOSIS — T78.40XD ALLERGIC DISORDER, SUBSEQUENT ENCOUNTER: ICD-10-CM

## 2018-09-18 DIAGNOSIS — I10 HYPERTENSION, UNSPECIFIED TYPE: ICD-10-CM

## 2018-09-18 RX ORDER — MONTELUKAST SODIUM 10 MG/1
TABLET ORAL
Qty: 90 TABLET | Refills: 1 | Status: SHIPPED | OUTPATIENT
Start: 2018-09-18 | End: 2019-03-18 | Stop reason: SDUPTHER

## 2018-09-18 RX ORDER — LOSARTAN POTASSIUM 25 MG/1
TABLET ORAL
Qty: 90 TABLET | Refills: 1 | Status: SHIPPED | OUTPATIENT
Start: 2018-09-18 | End: 2019-03-18 | Stop reason: SDUPTHER

## 2018-09-28 ENCOUNTER — OFFICE VISIT (OUTPATIENT)
Dept: OBGYN CLINIC | Facility: MEDICAL CENTER | Age: 78
End: 2018-09-28
Payer: COMMERCIAL

## 2018-09-28 VITALS
BODY MASS INDEX: 36.32 KG/M2 | WEIGHT: 226 LBS | HEIGHT: 66 IN | HEART RATE: 67 BPM | SYSTOLIC BLOOD PRESSURE: 134 MMHG | DIASTOLIC BLOOD PRESSURE: 79 MMHG

## 2018-09-28 DIAGNOSIS — M17.11 PRIMARY OSTEOARTHRITIS OF RIGHT KNEE: Primary | ICD-10-CM

## 2018-09-28 PROCEDURE — 99213 OFFICE O/P EST LOW 20 MIN: CPT | Performed by: ORTHOPAEDIC SURGERY

## 2018-09-28 NOTE — PROGRESS NOTES
Assessment/Plan:    Patient seen and examined with Dr Haley Chappell  She presents for follow-up for right knee OA  She is asymptomatic at this time, and doing very well  Given this, he recommendation would be to repeat Euflexxa injections in 3 months time  Follow-up 3 months  Problem List Items Addressed This Visit     Primary osteoarthritis of right knee - Primary            Subjective:      Patient ID: Delonte Merrill is a 70 y o  female  HPI     Patient is a 82-year-old female presents for follow-up appointment  She has known right knee osteoarthritis and did received Euflexxa injections about three months ago  Over that time frame, she has experienced significant relief in her symptoms which consist of dull aching pain  Overall, currently she has minimal symptoms and does not feel like she needs any injections in the office today  She denies any new symptoms and has no complaints today  She is happy with the results of the gel injections  The following portions of the patient's history were reviewed and updated as appropriate: allergies, current medications, past family history, past medical history, past social history, past surgical history and problem list     Review of Systems   Constitutional: Negative for chills, diaphoresis, fatigue and fever  Respiratory: Negative  Cardiovascular: Negative for chest pain, palpitations and leg swelling  Genitourinary: Negative  Musculoskeletal: Negative  Skin: Negative  Neurological: Negative for weakness and numbness  Objective:      /79   Pulse 67   Ht 5' 6" (1 676 m)   Wt 103 kg (226 lb)   BMI 36 48 kg/m²          Physical Exam   Constitutional: She is oriented to person, place, and time  She appears well-developed and well-nourished  No distress  HENT:   Head: Normocephalic and atraumatic  Pulmonary/Chest: Effort normal    Musculoskeletal: She exhibits no tenderness     Neurological: She is alert and oriented to person, place, and time  Skin: Skin is warm and dry  She is not diaphoretic  Psychiatric: She has a normal mood and affect  Nursing note and vitals reviewed  No acute distress  Gait is normal   Right knee: Inspection reveals no open wounds, erythema, ecchymoses  There is no effusion  Medial and lateral joint lines are nontender to palpation    No pain with varus or valgus stress  Extension to 0, flexion past 120

## 2018-11-08 ENCOUNTER — OFFICE VISIT (OUTPATIENT)
Dept: FAMILY MEDICINE CLINIC | Facility: CLINIC | Age: 78
End: 2018-11-08
Payer: COMMERCIAL

## 2018-11-08 VITALS
BODY MASS INDEX: 38.15 KG/M2 | RESPIRATION RATE: 16 BRPM | WEIGHT: 229 LBS | OXYGEN SATURATION: 96 % | HEIGHT: 65 IN | TEMPERATURE: 98.2 F | DIASTOLIC BLOOD PRESSURE: 64 MMHG | HEART RATE: 68 BPM | SYSTOLIC BLOOD PRESSURE: 118 MMHG

## 2018-11-08 DIAGNOSIS — I10 HYPERTENSION, UNSPECIFIED TYPE: ICD-10-CM

## 2018-11-08 DIAGNOSIS — Z00.00 MEDICARE ANNUAL WELLNESS VISIT, SUBSEQUENT: Primary | ICD-10-CM

## 2018-11-08 DIAGNOSIS — Z13.820 SCREENING FOR OSTEOPOROSIS: ICD-10-CM

## 2018-11-08 DIAGNOSIS — M17.11 PRIMARY OSTEOARTHRITIS OF RIGHT KNEE: ICD-10-CM

## 2018-11-08 DIAGNOSIS — E78.5 DYSLIPIDEMIA: ICD-10-CM

## 2018-11-08 DIAGNOSIS — E03.9 HYPOTHYROIDISM, UNSPECIFIED TYPE: ICD-10-CM

## 2018-11-08 DIAGNOSIS — R53.83 FATIGUE, UNSPECIFIED TYPE: ICD-10-CM

## 2018-11-08 PROCEDURE — 4040F PNEUMOC VAC/ADMIN/RCVD: CPT | Performed by: INTERNAL MEDICINE

## 2018-11-08 PROCEDURE — 1170F FXNL STATUS ASSESSED: CPT | Performed by: INTERNAL MEDICINE

## 2018-11-08 PROCEDURE — 3078F DIAST BP <80 MM HG: CPT | Performed by: INTERNAL MEDICINE

## 2018-11-08 PROCEDURE — 1125F AMNT PAIN NOTED PAIN PRSNT: CPT | Performed by: INTERNAL MEDICINE

## 2018-11-08 PROCEDURE — G0439 PPPS, SUBSEQ VISIT: HCPCS | Performed by: INTERNAL MEDICINE

## 2018-11-08 PROCEDURE — 3074F SYST BP LT 130 MM HG: CPT | Performed by: INTERNAL MEDICINE

## 2018-11-08 PROCEDURE — 99214 OFFICE O/P EST MOD 30 MIN: CPT | Performed by: INTERNAL MEDICINE

## 2018-11-08 PROCEDURE — 3008F BODY MASS INDEX DOCD: CPT | Performed by: INTERNAL MEDICINE

## 2018-11-08 NOTE — PROGRESS NOTES
Assessment and Plan:    Problem List Items Addressed This Visit     Hypertension    Hypothyroidism    Relevant Orders    TSH, 3rd generation (Completed)    T4, free (Completed)    Primary osteoarthritis of right knee      Other Visit Diagnoses     Medicare annual wellness visit, subsequent    -  Primary    Screening for osteoporosis        Relevant Orders    DXA bone density spine hip and pelvis    Dyslipidemia        Relevant Orders    Comprehensive metabolic panel (Completed)    Lipid panel (Completed)    Fatigue, unspecified type        Relevant Orders    CBC (Completed)        Health Maintenance Due   Topic Date Due    DXA SCAN  1940    INFLUENZA VACCINE  07/01/2018         HPI:  Tasia Acevedo is a 70 y o  female here for her Subsequent Wellness Visit      Patient Active Problem List   Diagnosis    Hypertension    Hypothyroidism    Overactive bladder    Palpitations    EKG, abnormal    Primary osteoarthritis of right knee    Chronic pain of right knee     Past Medical History:   Diagnosis Date    Seasonal allergies      Past Surgical History:   Procedure Laterality Date    ANKLE SURGERY      x2    APPENDECTOMY      CHOLECYSTECTOMY      laparoscopic    GALLBLADDER SURGERY      HYSTERECTOMY Bilateral     total abdominal with removal of both ovaries    INCISION TENDON SHEATH HAND      of a finger    NEUROPLASTY / TRANSPOSITION MEDIAN NERVE AT CARPAL TUNNEL      TUBAL LIGATION       Family History   Problem Relation Age of Onset    Heart disease Mother         endocarditis    Heart disease Father     Prostate cancer Father      History   Smoking Status    Never Smoker   Smokeless Tobacco    Never Used     History   Alcohol Use    Yes     Comment: wine with dinner; occasional use as per [employer]      History   Drug Use No       Current Outpatient Prescriptions   Medication Sig Dispense Refill    Aspirin (ASPIR-81 PO) Aspir-81 81 MG Oral Tablet Delayed Release  TAKE 1 TABLET (81 MG TOTAL) BY MOUTH DAILY  Refills: 0       Paz Atkins DO;  Started 27-May-2016  Active      Biotin 1 MG CAPS Biotin 1 MG Oral Capsule   Refills: 0       Paz Atkins DO;  Started 27-May-2016  Active      Coral Calcium 1000 (390 Ca) MG TABS Coral Calcium 500 MG Oral Tablet   Refills: 0       Paz Atkins DO;  Started 27-May-2016  Active      docusate sodium (COLACE) 100 mg capsule Take by mouth 3 (three) times a day      doxylamine (UNISON) 25 MG tablet Sleep Aid 25 MG Oral Tablet   Refills: 0       Paz Atkins DO;  Started 27-May-2016  Active      Famotidine (PEPCID PO) Take 20 mg by mouth 2 (two) times a day        levothyroxine 112 mcg tablet Take 112 mcg by mouth daily      losartan (COZAAR) 25 mg tablet TAKE 1 TABLET DAILY 90 tablet 1    metoprolol succinate (TOPROL-XL) 50 mg 24 hr tablet Take 1 tablet by mouth daily      montelukast (SINGULAIR) 10 mg tablet TAKE 1 TABLET DAILY AS DIRECTED 90 tablet 1    Multiple Vitamins-Minerals (MULTIVITAMIN ADULT PO) Take by mouth      Omega-3 1000 MG CAPS Take by mouth      oxybutynin (DITROPAN-XL) 10 MG 24 hr tablet Take 1 tablet by mouth      Psyllium (METAMUCIL FIBER PO) Take by mouth      Red Yeast Rice 600 MG CAPS Take by mouth      levothyroxine 112 mcg tablet TAKE 1 TABLET DAILY (Patient not taking: Reported on 11/8/2018) 90 tablet 1     No current facility-administered medications for this visit  Allergies   Allergen Reactions    Prednisone Tachycardia    Vicodin [Hydrocodone-Acetaminophen] Lightheadedness     Immunization History   Administered Date(s) Administered    Influenza 09/15/2016    Pneumococcal Conjugate 13-Valent 09/15/2016    Pneumococcal Polysaccharide PPV23 10/01/2014    Tdap 09/15/2016    Zoster 07/31/2017       Patient Care Team:  Rachelle Nolen DO as PCP - General    Medicare Screening Tests and Risk Assessments:      Health Risk Assessment:  Patient rates overall health as very good   Patient feels that their physical health rating is Same  Eyesight was rated as Slightly worse  Hearing was rated as Same  Patient feels that their emotional and mental health rating is Same  Pain experienced by patient in the last 7 days has been None  Patient states that she has experienced no weight loss or gain in last 6 months  Emotional/Mental Health:  Patient has been feeling nervous/anxious  PHQ-9 Depression Screening:    Frequency of the following problems over the past two weeks:      1  Little interest or pleasure in doing things: 0 - not at all      2  Feeling down, depressed, or hopeless: 0 - not at all  PHQ-2 Score: 0          Broken Bones/Falls: Fall Risk Assessment:    In the past year, patient has experienced: No history of falling in past year          Bladder/Bowel:  Patient has leaked urine accidently in the last six months  Patient reports no loss of bowel control  Immunizations:  Patient has had a flu vaccination within the last year  Patient has received a pneumonia shot  Patient has received a shingles shot  Patient has received tetanus/diphtheria shot  Home Safety:  Patient does not have trouble with stairs inside or outside of their home  Patient currently reports that there are no safety hazards present in home, working smoke alarms, working carbon monoxide detectors  Preventative Screenings:   Breast cancer screening performed, colon cancer screen completed, cholesterol screen completed, glaucoma eye exam completed,     Nutrition:  Current diet: Regular with servings of the following:    Medications:  Patient is currently taking over-the-counter supplements  Patient is able to manage medications  Lifestyle Choices:  Patient reports no tobacco use  Patient has not smoked or used tobacco in the past   Patient reports alcohol use  Patient drives a vehicle  Patient wears seat belt          Activities of Daily Living:  Can get out of bed by his or her self, able to dress self, able to make own meals, able to do own shopping, able to bathe self, can do own laundry/housekeeping, can manage own money, pay bills and track expenses    Previous Hospitalizations:  Hospitalization or ED visit in past 12 months  Number of hospitalizations within the last year: 1-2        Advanced Directives:  Patient has decided on a power of   Patient has spoken to designated power of   Patient has completed advanced directive  Preventative Screening/Counseling:      Cardiovascular:      General: Risks and Benefits Discussed and Screening Current          Diabetes:      General: Risks and Benefits Discussed and Screening Current          Colorectal Cancer:      General: Risks and Benefits Discussed and Patient Declines          Breast Cancer:      General: Risks and Benefits Discussed and Screening Current          Cervical Cancer:      General: Risks and Benefits Discussed and Patient Declines          Osteoporosis:      General: Risks and Benefits Discussed and Screening Current          AAA:      General: Risks and Benefits Discussed and Screening Not Indicated          Glaucoma:      General: Risks and Benefits Discussed and Screening Current          HIV:      General: Risks and Benefits Discussed and Patient Declines          Hepatitis C:      General: Risks and Benefits Discussed and Patient Declines        Advanced Directives:   Patient has living will for healthcare, has durable POA for healthcare, patient has an advanced directive  Additional Comments: dnr if no hope  Immunizations:      Influenza: Risks & Benefits Discussed and Influenza UTD This Year      Pneumococcal: Risks & Benefits Discussed and Lifetime Vaccine Completed      Zostavax: Risks & Benefits Discussed and Zostavax Vaccine UTD      TDAP: Risks & Benefits Discussed and Tdap Vaccine UTD      Other Preventative Counseling (Non-Medicare):   Fall Prevention and Increase physical activity

## 2018-11-13 ENCOUNTER — APPOINTMENT (OUTPATIENT)
Dept: LAB | Age: 78
End: 2018-11-13
Payer: COMMERCIAL

## 2018-11-13 DIAGNOSIS — E03.9 HYPOTHYROIDISM, UNSPECIFIED TYPE: ICD-10-CM

## 2018-11-13 DIAGNOSIS — E78.5 DYSLIPIDEMIA: ICD-10-CM

## 2018-11-13 DIAGNOSIS — R53.83 FATIGUE, UNSPECIFIED TYPE: ICD-10-CM

## 2018-11-13 LAB
ALBUMIN SERPL BCP-MCNC: 3.8 G/DL (ref 3.5–5)
ALP SERPL-CCNC: 65 U/L (ref 46–116)
ALT SERPL W P-5'-P-CCNC: 27 U/L (ref 12–78)
ANION GAP SERPL CALCULATED.3IONS-SCNC: 3 MMOL/L (ref 4–13)
AST SERPL W P-5'-P-CCNC: 23 U/L (ref 5–45)
BILIRUB SERPL-MCNC: 0.79 MG/DL (ref 0.2–1)
BUN SERPL-MCNC: 13 MG/DL (ref 5–25)
CALCIUM SERPL-MCNC: 9.3 MG/DL (ref 8.3–10.1)
CHLORIDE SERPL-SCNC: 106 MMOL/L (ref 100–108)
CHOLEST SERPL-MCNC: 218 MG/DL (ref 50–200)
CO2 SERPL-SCNC: 30 MMOL/L (ref 21–32)
CREAT SERPL-MCNC: 0.94 MG/DL (ref 0.6–1.3)
ERYTHROCYTE [DISTWIDTH] IN BLOOD BY AUTOMATED COUNT: 12.8 % (ref 11.6–15.1)
GFR SERPL CREATININE-BSD FRML MDRD: 59 ML/MIN/1.73SQ M
GLUCOSE P FAST SERPL-MCNC: 105 MG/DL (ref 65–99)
HCT VFR BLD AUTO: 47.9 % (ref 34.8–46.1)
HDLC SERPL-MCNC: 69 MG/DL (ref 40–60)
HGB BLD-MCNC: 16.2 G/DL (ref 11.5–15.4)
LDLC SERPL CALC-MCNC: 116 MG/DL (ref 0–100)
MCH RBC QN AUTO: 34.8 PG (ref 26.8–34.3)
MCHC RBC AUTO-ENTMCNC: 33.8 G/DL (ref 31.4–37.4)
MCV RBC AUTO: 103 FL (ref 82–98)
NONHDLC SERPL-MCNC: 149 MG/DL
PLATELET # BLD AUTO: 235 THOUSANDS/UL (ref 149–390)
PMV BLD AUTO: 12.8 FL (ref 8.9–12.7)
POTASSIUM SERPL-SCNC: 4.9 MMOL/L (ref 3.5–5.3)
PROT SERPL-MCNC: 7.3 G/DL (ref 6.4–8.2)
RBC # BLD AUTO: 4.66 MILLION/UL (ref 3.81–5.12)
SODIUM SERPL-SCNC: 139 MMOL/L (ref 136–145)
T4 FREE SERPL-MCNC: 1.09 NG/DL (ref 0.76–1.46)
TRIGL SERPL-MCNC: 164 MG/DL
TSH SERPL DL<=0.05 MIU/L-ACNC: 3.94 UIU/ML (ref 0.36–3.74)
WBC # BLD AUTO: 5.43 THOUSAND/UL (ref 4.31–10.16)

## 2018-11-13 PROCEDURE — 36415 COLL VENOUS BLD VENIPUNCTURE: CPT

## 2018-11-13 PROCEDURE — 80061 LIPID PANEL: CPT

## 2018-11-13 PROCEDURE — 84443 ASSAY THYROID STIM HORMONE: CPT

## 2018-11-13 PROCEDURE — 85027 COMPLETE CBC AUTOMATED: CPT

## 2018-11-13 PROCEDURE — 84439 ASSAY OF FREE THYROXINE: CPT

## 2018-11-13 PROCEDURE — 80053 COMPREHEN METABOLIC PANEL: CPT

## 2018-12-28 DIAGNOSIS — M25.561 CHRONIC PAIN OF RIGHT KNEE: ICD-10-CM

## 2018-12-28 DIAGNOSIS — M17.11 PRIMARY OSTEOARTHRITIS OF RIGHT KNEE: Primary | ICD-10-CM

## 2018-12-28 DIAGNOSIS — G89.29 CHRONIC PAIN OF RIGHT KNEE: ICD-10-CM

## 2019-01-08 ENCOUNTER — TELEPHONE (OUTPATIENT)
Dept: OBGYN CLINIC | Facility: CLINIC | Age: 79
End: 2019-01-08

## 2019-01-08 NOTE — TELEPHONE ENCOUNTER
Caller: [address]  C/B # [id number]  Dr [facility] called to set up delivery for injections  Please contact at [phone]    Thanks

## 2019-01-09 DIAGNOSIS — I10 ESSENTIAL HYPERTENSION: Primary | ICD-10-CM

## 2019-01-09 DIAGNOSIS — N32.81 OVERACTIVE BLADDER: ICD-10-CM

## 2019-01-13 RX ORDER — METOPROLOL SUCCINATE 50 MG/1
TABLET, EXTENDED RELEASE ORAL
Qty: 90 TABLET | Refills: 3 | Status: SHIPPED | OUTPATIENT
Start: 2019-01-13 | End: 2019-12-23 | Stop reason: SDUPTHER

## 2019-01-13 RX ORDER — OXYBUTYNIN CHLORIDE 10 MG/1
TABLET, EXTENDED RELEASE ORAL
Qty: 90 TABLET | Refills: 3 | Status: SHIPPED | OUTPATIENT
Start: 2019-01-13 | End: 2019-12-23 | Stop reason: SDUPTHER

## 2019-02-08 ENCOUNTER — OFFICE VISIT (OUTPATIENT)
Dept: OBGYN CLINIC | Facility: MEDICAL CENTER | Age: 79
End: 2019-02-08
Payer: COMMERCIAL

## 2019-02-08 VITALS
SYSTOLIC BLOOD PRESSURE: 145 MMHG | HEART RATE: 62 BPM | BODY MASS INDEX: 38.15 KG/M2 | WEIGHT: 229 LBS | DIASTOLIC BLOOD PRESSURE: 78 MMHG | HEIGHT: 65 IN

## 2019-02-08 DIAGNOSIS — M17.11 PRIMARY OSTEOARTHRITIS OF RIGHT KNEE: Primary | ICD-10-CM

## 2019-02-08 DIAGNOSIS — G89.29 CHRONIC PAIN OF RIGHT KNEE: ICD-10-CM

## 2019-02-08 DIAGNOSIS — M25.561 CHRONIC PAIN OF RIGHT KNEE: ICD-10-CM

## 2019-02-08 PROCEDURE — 20610 DRAIN/INJ JOINT/BURSA W/O US: CPT | Performed by: ORTHOPAEDIC SURGERY

## 2019-02-08 RX ORDER — PREDNISOLONE ACETATE 10 MG/ML
SUSPENSION/ DROPS OPHTHALMIC
COMMUNITY
Start: 2019-02-05 | End: 2019-05-08

## 2019-02-08 RX ORDER — HYALURONATE SODIUM 10 MG/ML
20 SYRINGE (ML) INTRAARTICULAR
Status: COMPLETED | OUTPATIENT
Start: 2019-02-08 | End: 2019-02-08

## 2019-02-08 RX ADMIN — Medication 20 MG: at 11:46

## 2019-02-08 NOTE — PROGRESS NOTES
Assessment:  1  Primary osteoarthritis of right knee  Large joint arthrocentesis   2  Chronic pain of right knee  Large joint arthrocentesis       Plan:  Right knee known osteoarthritis and chronic pain  Initiation of the viscosupplementation injection series performed today in the form of Euflexxa  Euflexxa #1 was performed to her right knee  Ice and post injection protocol advised  Weightbearing activities as tolerated  Follow up each of the next 2 weeks to complete the viscosupplementation series  To do next visit:  Return in about 1 week (around 2/15/2019) for re-check and Euflexxa #2 right knee  The above stated was discussed in layman's terms and the patient expressed understanding  All questions were answered to the patient's satisfaction  Scribe Attestation    I,:   Angie Martinez am acting as a scribe while in the presence of the attending physician :        I,:   Amparo Johnson MD personally performed the services described in this documentation    as scribed in my presence :              Subjective:   Iram Cheema is a 70 y o  female who presents for initiation of the Euflexxa Visco supplementation injection series for her right knee for ongoing treatment of her known osteoarthritis  She has had previous series of viscosupplementation which has found to be beneficial  Denies any calf or thigh pain  Denies any new onset of symptoms  Pain medially at her knee increases with weight-bearing        Review of systems negative unless otherwise specified in HPI    Past Medical History:   Diagnosis Date    Seasonal allergies        Past Surgical History:   Procedure Laterality Date    ANKLE SURGERY      x2    APPENDECTOMY      CHOLECYSTECTOMY      laparoscopic    GALLBLADDER SURGERY      HYSTERECTOMY Bilateral     total abdominal with removal of both ovaries    INCISION TENDON SHEATH HAND      of a finger    NEUROPLASTY / TRANSPOSITION MEDIAN NERVE AT CARPAL TUNNEL      TUBAL LIGATION         Family History   Problem Relation Age of Onset    Heart disease Mother         endocarditis    Heart disease Father     Prostate cancer Father        Social History     Occupational History    Not on file  Social History Main Topics    Smoking status: Never Smoker    Smokeless tobacco: Never Used    Alcohol use Yes      Comment: wine with dinner; occasional use as per [employer]    Drug use: No    Sexual activity: Not on file         Current Outpatient Prescriptions:     Aspirin (ASPIR-81 PO), Aspir-81 81 MG Oral Tablet Delayed Release TAKE 1 TABLET (81 MG TOTAL) BY MOUTH DAILY    Refills: 0    Ardia Peterson DO;  Started 27-May-2016 Active, Disp: , Rfl:     Biotin 1 MG CAPS, Biotin 1 MG Oral Capsule  Refills: 0    Ardia Peterson DO;  Started 27-May-2016 Active, Disp: , Rfl:     Coral Calcium 1000 (390 Ca) MG TABS, Coral Calcium 500 MG Oral Tablet  Refills: 0    Ardia Peterson DO;  Started 27-May-2016 Active, Disp: , Rfl:     docusate sodium (COLACE) 100 mg capsule, Take by mouth 3 (three) times a day, Disp: , Rfl:     doxylamine (UNISON) 25 MG tablet, Sleep Aid 25 MG Oral Tablet  Refills: 0    Ardia Peterson DO;  Started 27-May-2016 Active, Disp: , Rfl:     Famotidine (PEPCID PO), Take 20 mg by mouth 2 (two) times a day  , Disp: , Rfl:     levothyroxine 112 mcg tablet, Take 112 mcg by mouth daily, Disp: , Rfl:     losartan (COZAAR) 25 mg tablet, TAKE 1 TABLET DAILY, Disp: 90 tablet, Rfl: 1    metoprolol succinate (TOPROL-XL) 50 mg 24 hr tablet, TAKE 1 TABLET DAILY, Disp: 90 tablet, Rfl: 3    montelukast (SINGULAIR) 10 mg tablet, TAKE 1 TABLET DAILY AS DIRECTED, Disp: 90 tablet, Rfl: 1    Multiple Vitamins-Minerals (MULTIVITAMIN ADULT PO), Take by mouth, Disp: , Rfl:     Omega-3 1000 MG CAPS, Take by mouth, Disp: , Rfl:     oxybutynin (DITROPAN-XL) 10 MG 24 hr tablet, TAKE 1 TABLET AT BEDTIME, Disp: 90 tablet, Rfl: 3    prednisoLONE acetate (PRED FORTE) 1 % ophthalmic suspension, , Disp: , Rfl:     Psyllium (METAMUCIL FIBER PO), Take by mouth, Disp: , Rfl:     Red Yeast Rice 600 MG CAPS, Take by mouth, Disp: , Rfl:     Allergies   Allergen Reactions    Prednisone Tachycardia    Vicodin [Hydrocodone-Acetaminophen] Lightheadedness            Vitals:    02/08/19 1116   BP: 145/78   Pulse: 62       Objective:                    Right Knee Exam     Tenderness   The patient is experiencing tenderness in the medial joint line and lateral joint line  Range of Motion   The patient has normal right knee ROM  Right knee flexion: with crepitus  Muscle Strength     The patient has normal right knee strength  Other   Erythema: absent  Sensation: normal  Swelling: mild  Other tests: no effusion present    Comments:    Bony enlargement both medially and laterally              Diagnostics, reviewed and taken today if performed as documented:    None performed        Procedures, if performed today:    Large joint arthrocentesis  Date/Time: 2/8/2019 11:46 AM  Consent given by: patient  Site marked: site marked  Timeout: Immediately prior to procedure a time out was called to verify the correct patient, procedure, equipment, support staff and site/side marked as required   Supporting Documentation  Indications: pain and diagnostic evaluation   Procedure Details  Location: knee - R knee  Preparation: Patient was prepped and draped in the usual sterile fashion  Needle size: 22 G  Ultrasound guidance: no  Medications administered: 20 mg Sodium Hyaluronate 20 MG/2ML  Specialty Pharmacy Supplied: received medications from pharmacy  Patient tolerance: patient tolerated the procedure well with no immediate complications  Dressing:  Sterile dressing applied             Portions of the record may have been created with voice recognition software   Occasional wrong word or "sound a like" substitutions may have occurred due to the inherent limitations of voice recognition software   Read the chart carefully and recognize, using context, where substitutions have occurred

## 2019-02-15 ENCOUNTER — TELEPHONE (OUTPATIENT)
Dept: FAMILY MEDICINE CLINIC | Facility: CLINIC | Age: 79
End: 2019-02-15

## 2019-02-15 ENCOUNTER — OFFICE VISIT (OUTPATIENT)
Dept: OBGYN CLINIC | Facility: MEDICAL CENTER | Age: 79
End: 2019-02-15
Payer: COMMERCIAL

## 2019-02-15 VITALS
HEIGHT: 65 IN | DIASTOLIC BLOOD PRESSURE: 76 MMHG | HEART RATE: 84 BPM | WEIGHT: 229 LBS | BODY MASS INDEX: 38.15 KG/M2 | SYSTOLIC BLOOD PRESSURE: 141 MMHG

## 2019-02-15 DIAGNOSIS — M25.561 CHRONIC PAIN OF RIGHT KNEE: Primary | ICD-10-CM

## 2019-02-15 DIAGNOSIS — M17.11 PRIMARY OSTEOARTHRITIS OF RIGHT KNEE: ICD-10-CM

## 2019-02-15 DIAGNOSIS — E03.9 HYPOTHYROIDISM, UNSPECIFIED TYPE: Primary | ICD-10-CM

## 2019-02-15 DIAGNOSIS — G89.29 CHRONIC PAIN OF RIGHT KNEE: Primary | ICD-10-CM

## 2019-02-15 PROCEDURE — 20610 DRAIN/INJ JOINT/BURSA W/O US: CPT | Performed by: ORTHOPAEDIC SURGERY

## 2019-02-15 RX ORDER — HYALURONATE SODIUM 10 MG/ML
20 SYRINGE (ML) INTRAARTICULAR
Status: COMPLETED | OUTPATIENT
Start: 2019-02-15 | End: 2019-02-15

## 2019-02-15 RX ORDER — LIDOCAINE HYDROCHLORIDE 10 MG/ML
3 INJECTION, SOLUTION INFILTRATION; PERINEURAL
Status: COMPLETED | OUTPATIENT
Start: 2019-02-15 | End: 2019-02-15

## 2019-02-15 RX ORDER — LEVOTHYROXINE SODIUM 0.12 MG/1
125 TABLET ORAL
Qty: 30 TABLET | Refills: 1 | Status: SHIPPED | OUTPATIENT
Start: 2019-02-15 | End: 2019-02-26 | Stop reason: SDUPTHER

## 2019-02-15 RX ADMIN — LIDOCAINE HYDROCHLORIDE 3 ML: 10 INJECTION, SOLUTION INFILTRATION; PERINEURAL at 10:17

## 2019-02-15 RX ADMIN — Medication 20 MG: at 10:17

## 2019-02-15 NOTE — PROGRESS NOTES
Assessment:  1  Chronic pain of right knee  Large joint arthrocentesis: R knee   2  Primary osteoarthritis of right knee  Large joint arthrocentesis: R knee       Plan:  Right knee known osteoarthritis and chronic pain  Euflexxa #2 was performed to her right knee  Ice and post injection protocol advised  Weightbearing activities as tolerated  Follow up next week to complete the viscosupplementation series      To do next visit:  Return in about 1 week (around 2/22/2019) for re-check and Euflexxa #3 right knee  The above stated was discussed in layman's terms and the patient expressed understanding  All questions were answered to the patient's satisfaction  Scribe Attestation    I,:   Claudell Alias am acting as a scribe while in the presence of the attending physician :        I,:   Diony Reeves MD personally performed the services described in this documentation    as scribed in my presence :              Subjective:   Clint Brennan is a 70 y o  female who presents for Euflexxa #2 for ongoing treatment of her known osteoarthritis at her right knee  Last week she had initiation of the viscosupplementation injection series returns today for the 2nd of 3rd injection  She continues to have medial knee pain that increases with prolonged weight-bearing  No ill her side effects after last week's injection        Review of systems negative unless otherwise specified in HPI    Past Medical History:   Diagnosis Date    Seasonal allergies        Past Surgical History:   Procedure Laterality Date    ANKLE SURGERY      x2    APPENDECTOMY      CHOLECYSTECTOMY      laparoscopic    GALLBLADDER SURGERY      HYSTERECTOMY Bilateral     total abdominal with removal of both ovaries    INCISION TENDON SHEATH HAND      of a finger    NEUROPLASTY / TRANSPOSITION MEDIAN NERVE AT CARPAL TUNNEL      TUBAL LIGATION         Family History   Problem Relation Age of Onset    Heart disease Mother endocarditis    Heart disease Father     Prostate cancer Father        Social History     Occupational History    Not on file   Tobacco Use    Smoking status: Never Smoker    Smokeless tobacco: Never Used   Substance and Sexual Activity    Alcohol use: Yes     Comment: wine with dinner; occasional use as per [employer]    Drug use: No    Sexual activity: Not on file         Current Outpatient Medications:     Aspirin (ASPIR-81 PO), Aspir-81 81 MG Oral Tablet Delayed Release TAKE 1 TABLET (81 MG TOTAL) BY MOUTH DAILY    Refills: 0    Dhara Leadore DO;  Started 27-May-2016 Active, Disp: , Rfl:     Biotin 1 MG CAPS, Biotin 1 MG Oral Capsule  Refills: 0    Dhara Tabitha DO;  Started 27-May-2016 Active, Disp: , Rfl:     Coral Calcium 1000 (390 Ca) MG TABS, Coral Calcium 500 MG Oral Tablet  Refills: 0    Dhara Tabitha DO;  Started 27-May-2016 Active, Disp: , Rfl:     docusate sodium (COLACE) 100 mg capsule, Take by mouth 3 (three) times a day, Disp: , Rfl:     doxylamine (UNISON) 25 MG tablet, Sleep Aid 25 MG Oral Tablet  Refills: 0    Dhara Tabitha DO;  Started 27-May-2016 Active, Disp: , Rfl:     Famotidine (PEPCID PO), Take 20 mg by mouth 2 (two) times a day  , Disp: , Rfl:     levothyroxine 112 mcg tablet, Take 112 mcg by mouth daily, Disp: , Rfl:     losartan (COZAAR) 25 mg tablet, TAKE 1 TABLET DAILY, Disp: 90 tablet, Rfl: 1    metoprolol succinate (TOPROL-XL) 50 mg 24 hr tablet, TAKE 1 TABLET DAILY, Disp: 90 tablet, Rfl: 3    montelukast (SINGULAIR) 10 mg tablet, TAKE 1 TABLET DAILY AS DIRECTED, Disp: 90 tablet, Rfl: 1    Multiple Vitamins-Minerals (MULTIVITAMIN ADULT PO), Take by mouth, Disp: , Rfl:     Omega-3 1000 MG CAPS, Take by mouth, Disp: , Rfl:     oxybutynin (DITROPAN-XL) 10 MG 24 hr tablet, TAKE 1 TABLET AT BEDTIME, Disp: 90 tablet, Rfl: 3    prednisoLONE acetate (PRED FORTE) 1 % ophthalmic suspension, , Disp: , Rfl:     Psyllium (METAMUCIL FIBER PO), Take by mouth, Disp: , Rfl:   Red Yeast Rice 600 MG CAPS, Take by mouth, Disp: , Rfl:     Allergies   Allergen Reactions    Prednisone Tachycardia    Vicodin [Hydrocodone-Acetaminophen] Lightheadedness            Vitals:    02/15/19 1011   BP: 141/76   Pulse: 84       Objective:                    Right Knee Exam     Muscle Strength   The patient has normal right knee strength  Tenderness   The patient is experiencing tenderness in the medial joint line and lateral joint line  Range of Motion   The patient has normal right knee ROM  Right knee flexion: with crepitus  Other   Erythema: absent  Sensation: normal  Swelling: mild  Effusion: no effusion present    Comments:    Bony enlargement both medially and laterally              Diagnostics, reviewed and taken today if performed as documented:    None performed          Procedures, if performed today:    Large joint arthrocentesis: R knee  Date/Time: 2/15/2019 10:17 AM  Consent given by: patient  Site marked: site marked  Timeout: Immediately prior to procedure a time out was called to verify the correct patient, procedure, equipment, support staff and site/side marked as required   Supporting Documentation  Indications: pain and diagnostic evaluation   Procedure Details  Location: knee - R knee  Preparation: Patient was prepped and draped in the usual sterile fashion  Needle size: 22 G  Ultrasound guidance: no  Medications administered: 20 mg Sodium Hyaluronate 20 MG/2ML; 3 mL lidocaine 1 %  Specialty Pharmacy Supplied: received medications from pharmacy  Patient tolerance: patient tolerated the procedure well with no immediate complications  Dressing:  Sterile dressing applied              Portions of the record may have been created with voice recognition software   Occasional wrong word or "sound a like" substitutions may have occurred due to the inherent limitations of voice recognition software   Read the chart carefully and recognize, using context, where substitutions have occurred

## 2019-02-15 NOTE — TELEPHONE ENCOUNTER
Patient needs refills on her levothyroxine, she said that once she finished what she had you wanted to increase the dose  Please send over the corrected dose to the mail order pharmacy   So kevin would need a 90 day supply

## 2019-02-18 ENCOUNTER — OFFICE VISIT (OUTPATIENT)
Dept: OBGYN CLINIC | Facility: MEDICAL CENTER | Age: 79
End: 2019-02-18
Payer: COMMERCIAL

## 2019-02-18 VITALS
WEIGHT: 229 LBS | BODY MASS INDEX: 38.15 KG/M2 | SYSTOLIC BLOOD PRESSURE: 146 MMHG | DIASTOLIC BLOOD PRESSURE: 83 MMHG | HEART RATE: 69 BPM | HEIGHT: 65 IN

## 2019-02-18 DIAGNOSIS — M18.11 PRIMARY OSTEOARTHRITIS OF FIRST CARPOMETACARPAL JOINT OF RIGHT HAND: Primary | ICD-10-CM

## 2019-02-18 PROCEDURE — 99213 OFFICE O/P EST LOW 20 MIN: CPT | Performed by: ORTHOPAEDIC SURGERY

## 2019-02-18 PROCEDURE — 20600 DRAIN/INJ JOINT/BURSA W/O US: CPT | Performed by: ORTHOPAEDIC SURGERY

## 2019-02-18 RX ORDER — LIDOCAINE HYDROCHLORIDE 10 MG/ML
2 INJECTION, SOLUTION INFILTRATION; PERINEURAL
Status: COMPLETED | OUTPATIENT
Start: 2019-02-18 | End: 2019-02-18

## 2019-02-18 RX ORDER — LIDOCAINE HYDROCHLORIDE 10 MG/ML
0.5 INJECTION, SOLUTION INFILTRATION; PERINEURAL
Status: COMPLETED | OUTPATIENT
Start: 2019-02-18 | End: 2019-02-18

## 2019-02-18 RX ORDER — TRIAMCINOLONE ACETONIDE 40 MG/ML
20 INJECTION, SUSPENSION INTRA-ARTICULAR; INTRAMUSCULAR
Status: COMPLETED | OUTPATIENT
Start: 2019-02-18 | End: 2019-02-18

## 2019-02-18 RX ADMIN — LIDOCAINE HYDROCHLORIDE 2 ML: 10 INJECTION, SOLUTION INFILTRATION; PERINEURAL at 10:36

## 2019-02-18 RX ADMIN — Medication 0.05 MEQ: at 10:36

## 2019-02-18 RX ADMIN — LIDOCAINE HYDROCHLORIDE 0.5 ML: 10 INJECTION, SOLUTION INFILTRATION; PERINEURAL at 10:36

## 2019-02-18 RX ADMIN — TRIAMCINOLONE ACETONIDE 20 MG: 40 INJECTION, SUSPENSION INTRA-ARTICULAR; INTRAMUSCULAR at 10:36

## 2019-02-18 NOTE — PROGRESS NOTES
CHIEF COMPLAINT:  Chief Complaint   Patient presents with    Right Wrist - Follow-up       SUBJECTIVE:  Talia Wells is a 70y o  year old RHD female who presents to the office for a follow up apt for her right thumb CMC OA and right wrist DeQuervain tenosynovitis  Pt had a CSI for the DeQuervain tenosynovitis on 8/6/18 with relief of pain  Pt also received a CSI on 8/20/19 for right thumb CMC OA with great relief of discomfort  Pt states that in the past 2-3 weeks the pain in her right thumb has increased and she has had difficulty grasping objects  Pt states that she takes aleve for the pain with some relief  PAST MEDICAL HISTORY:  Past Medical History:   Diagnosis Date    Seasonal allergies        PAST SURGICAL HISTORY:  Past Surgical History:   Procedure Laterality Date    ANKLE SURGERY      x2    APPENDECTOMY      CHOLECYSTECTOMY      laparoscopic    GALLBLADDER SURGERY      HYSTERECTOMY Bilateral     total abdominal with removal of both ovaries    INCISION TENDON SHEATH HAND      of a finger    NEUROPLASTY / TRANSPOSITION MEDIAN NERVE AT CARPAL TUNNEL      TUBAL LIGATION         FAMILY HISTORY:  Family History   Problem Relation Age of Onset    Heart disease Mother         endocarditis    Heart disease Father     Prostate cancer Father        SOCIAL HISTORY:  Social History     Tobacco Use    Smoking status: Never Smoker    Smokeless tobacco: Never Used   Substance Use Topics    Alcohol use: Yes     Comment: wine with dinner; occasional use as per [employer]    Drug use: No       MEDICATIONS:    Current Outpatient Medications:     Aspirin (ASPIR-81 PO), Aspir-81 81 MG Oral Tablet Delayed Release TAKE 1 TABLET (81 MG TOTAL) BY MOUTH DAILY    Refills: 0    Alray Farias DO;  Started 27-May-2016 Active, Disp: , Rfl:     Biotin 1 MG CAPS, Biotin 1 MG Oral Capsule  Refills: 0    Alray Farias DO;  Started 27-May-2016 Active, Disp: , Rfl:     Coral Calcium 1000 (390 Ca) MG TABS, Coral Calcium 500 MG Oral Tablet  Refills: 0    Marvelyn Bon DO;  Started 27-May-2016 Active, Disp: , Rfl:     docusate sodium (COLACE) 100 mg capsule, Take by mouth 3 (three) times a day, Disp: , Rfl:     doxylamine (UNISON) 25 MG tablet, Sleep Aid 25 MG Oral Tablet  Refills: 0    Marvelyn Bon DO;  Started 27-May-2016 Active, Disp: , Rfl:     Famotidine (PEPCID PO), Take 20 mg by mouth 2 (two) times a day  , Disp: , Rfl:     levothyroxine 112 mcg tablet, Take 112 mcg by mouth daily, Disp: , Rfl:     levothyroxine 125 mcg tablet, Take 1 tablet (125 mcg total) by mouth daily in the early morning Check lab after 6 weeks  If lab okay will do 90 day supply, Disp: 30 tablet, Rfl: 1    losartan (COZAAR) 25 mg tablet, TAKE 1 TABLET DAILY, Disp: 90 tablet, Rfl: 1    metoprolol succinate (TOPROL-XL) 50 mg 24 hr tablet, TAKE 1 TABLET DAILY, Disp: 90 tablet, Rfl: 3    montelukast (SINGULAIR) 10 mg tablet, TAKE 1 TABLET DAILY AS DIRECTED, Disp: 90 tablet, Rfl: 1    Multiple Vitamins-Minerals (MULTIVITAMIN ADULT PO), Take by mouth, Disp: , Rfl:     Omega-3 1000 MG CAPS, Take by mouth, Disp: , Rfl:     oxybutynin (DITROPAN-XL) 10 MG 24 hr tablet, TAKE 1 TABLET AT BEDTIME, Disp: 90 tablet, Rfl: 3    prednisoLONE acetate (PRED FORTE) 1 % ophthalmic suspension, , Disp: , Rfl:     Psyllium (METAMUCIL FIBER PO), Take by mouth, Disp: , Rfl:     Red Yeast Rice 600 MG CAPS, Take by mouth, Disp: , Rfl:     ALLERGIES:  Allergies   Allergen Reactions    Prednisone Tachycardia    Vicodin [Hydrocodone-Acetaminophen] Lightheadedness       REVIEW OF SYSTEMS:  Review of Systems   Constitutional: Negative for chills, fever and unexpected weight change  HENT: Negative for hearing loss, nosebleeds and sore throat  Eyes: Negative for pain, redness and visual disturbance  Respiratory: Negative for cough, shortness of breath and wheezing  Cardiovascular: Negative for chest pain, palpitations and leg swelling  Gastrointestinal: Negative for abdominal pain, nausea and vomiting  Endocrine: Negative for polydipsia and polyuria  Genitourinary: Negative for dysuria and hematuria  Musculoskeletal: Positive for arthralgias  Negative for joint swelling and myalgias  Skin: Negative for rash and wound  Neurological: Negative for dizziness, numbness and headaches  Psychiatric/Behavioral: Negative for decreased concentration, dysphoric mood and suicidal ideas  The patient is not nervous/anxious  VITALS:  Vitals:    02/18/19 1014   BP: 146/83   Pulse: 69       LABS:  HgA1c: No results found for: HGBA1C  BMP:   Lab Results   Component Value Date    CALCIUM 9 3 11/13/2018    K 4 9 11/13/2018    CO2 30 11/13/2018     11/13/2018    BUN 13 11/13/2018    CREATININE 0 94 11/13/2018       _____________________________________________________  PHYSICAL EXAMINATION:  General: well developed and well nourished, alert, oriented times 3 and appears comfortable  Psychiatric: Normal  HEENT: Trachea Midline, No torticollis  Pulmonary: No audible wheezing or respiratory distress   Skin: No masses, erythema, lacerations, fluctation, ulcerations  Neurovascular: Sensation Intact to the Median, Ulnar, Radial Nerve, Motor Intact to the Median, Ulnar, Radial Nerve and Pulses Intact    MUSCULOSKELETAL EXAMINATION:  right CMC Exam:  No adduction contracture  No hyperextension deformity of MCP joint  Positive localized tenderness over radial and dorsal aspect of thumb (CMC joint)  Grind test is Positive for pain and Positive for crepitus  No triggering or tenderness over the A1 pulley  No pain with Finkelsteins maneuver             ___________________________________________________  STUDIES REVIEWED:  No studies reviewed         PROCEDURES PERFORMED:  Small joint arthrocentesis: R thumb CMC  Date/Time: 2/18/2019 10:36 AM  Consent given by: patient  Timeout: Immediately prior to procedure a time out was called to verify the correct patient, procedure, equipment, support staff and site/side marked as required   Supporting Documentation  Indications: pain   Procedure Details  Location: thumb - R thumb CMC  Needle size: 27 G  Ultrasound guidance: no  Medications administered: 0 05 mEq sodium bicarbonate 8 4 %; 20 mg triamcinolone acetonide 40 mg/mL; 2 mL lidocaine 1 %; 0 5 mL lidocaine 1 %    Patient tolerance: patient tolerated the procedure well with no immediate complications  Dressing:  Sterile dressing applied             _____________________________________________________  ASSESSMENT/PLAN:    Right thumb CMC OA  * CSI was administered today without complications  * Pt was advised to apply ice for possible increased pain from the CSI  * Pt was then advised to apply heat for possible poain in her thumb   * Pt was advised to continue the Aleve that she has been taking for pain         Follow Up:  Return if symptoms worsen or fail to improve        To Do Next Visit:  Re-evaluation of current issue        Scribe Attestation    I,:   Isaac Saenz am acting as a scribe while in the presence of the attending physician :        I,:   Jose Miguel Senior MD personally performed the services described in this documentation    as scribed in my presence :

## 2019-02-22 ENCOUNTER — OFFICE VISIT (OUTPATIENT)
Dept: OBGYN CLINIC | Facility: MEDICAL CENTER | Age: 79
End: 2019-02-22
Payer: COMMERCIAL

## 2019-02-22 VITALS
WEIGHT: 229 LBS | SYSTOLIC BLOOD PRESSURE: 153 MMHG | HEART RATE: 69 BPM | HEIGHT: 65 IN | BODY MASS INDEX: 38.15 KG/M2 | DIASTOLIC BLOOD PRESSURE: 81 MMHG

## 2019-02-22 DIAGNOSIS — G89.29 CHRONIC PAIN OF RIGHT KNEE: Primary | ICD-10-CM

## 2019-02-22 DIAGNOSIS — M25.561 CHRONIC PAIN OF RIGHT KNEE: Primary | ICD-10-CM

## 2019-02-22 DIAGNOSIS — M17.11 PRIMARY OSTEOARTHRITIS OF RIGHT KNEE: ICD-10-CM

## 2019-02-22 PROCEDURE — 20610 DRAIN/INJ JOINT/BURSA W/O US: CPT | Performed by: PHYSICIAN ASSISTANT

## 2019-02-22 RX ORDER — HYALURONATE SODIUM 10 MG/ML
20 SYRINGE (ML) INTRAARTICULAR
Status: COMPLETED | OUTPATIENT
Start: 2019-02-22 | End: 2019-02-22

## 2019-02-22 RX ADMIN — Medication 20 MG: at 11:47

## 2019-02-22 NOTE — PROGRESS NOTES
46-year-old female undergoing Euflexxa injections series of 3 injections to her right knee  She returns the office today for the final injection of the 3 shot series  She feels it is already benefitting her    She has had no unforeseen reaction from her right knee    Exam;    Her right knee appearance finds it is safe to administer medication today  There is no redness or warmth of concern    Large joint arthrocentesis: R knee  Date/Time: 2/22/2019 11:47 AM  Procedure Details  Location: knee - R knee  Needle size: 22 G  Medications administered: 20 mg Sodium Hyaluronate 20 MG/2ML  Specialty Pharmacy Supplied: received medications from pharmacy  Patient tolerance: patient tolerated the procedure well with no immediate complications  Dressing:  Sterile dressing applied       Impression;    Degenerative osteoarthritis of the right knee  Chronic right knee pain secondary to osteoarthritis    Plan;    She received her 3rd injection of the 3 shot series  She will return in 3 months  She may at that time have a injection of steroid , or of Toradol if she desires,   Taking note of her allergen history  This completed her care this date  Her experience was supervised by and plan formulated by the attending surgeon was my privilege to assist him in its delivery

## 2019-02-25 DIAGNOSIS — E03.9 HYPOTHYROIDISM, UNSPECIFIED TYPE: ICD-10-CM

## 2019-02-25 RX ORDER — LEVOTHYROXINE SODIUM 0.12 MG/1
125 TABLET ORAL
Qty: 30 TABLET | Refills: 1 | Status: CANCELLED | OUTPATIENT
Start: 2019-02-25

## 2019-02-25 NOTE — TELEPHONE ENCOUNTER
Patients levothyroxine was suppose to go to [facility], not [facility]  [facility] prescription was never picked up at [facility]

## 2019-02-26 DIAGNOSIS — E03.9 HYPOTHYROIDISM, UNSPECIFIED TYPE: ICD-10-CM

## 2019-02-26 RX ORDER — LEVOTHYROXINE SODIUM 0.12 MG/1
125 TABLET ORAL
Qty: 90 TABLET | Refills: 1 | Status: SHIPPED | OUTPATIENT
Start: 2019-02-26 | End: 2019-08-06 | Stop reason: SDUPTHER

## 2019-03-18 DIAGNOSIS — I10 HYPERTENSION, UNSPECIFIED TYPE: ICD-10-CM

## 2019-03-18 DIAGNOSIS — T78.40XD ALLERGIC DISORDER, SUBSEQUENT ENCOUNTER: ICD-10-CM

## 2019-03-18 RX ORDER — LOSARTAN POTASSIUM 25 MG/1
TABLET ORAL
Qty: 90 TABLET | Refills: 1 | Status: SHIPPED | OUTPATIENT
Start: 2019-03-18 | End: 2019-09-11 | Stop reason: SDUPTHER

## 2019-03-18 RX ORDER — MONTELUKAST SODIUM 10 MG/1
TABLET ORAL
Qty: 90 TABLET | Refills: 1 | Status: SHIPPED | OUTPATIENT
Start: 2019-03-18 | End: 2019-09-11 | Stop reason: SDUPTHER

## 2019-05-08 ENCOUNTER — OFFICE VISIT (OUTPATIENT)
Dept: FAMILY MEDICINE CLINIC | Facility: CLINIC | Age: 79
End: 2019-05-08
Payer: COMMERCIAL

## 2019-05-08 ENCOUNTER — APPOINTMENT (OUTPATIENT)
Dept: LAB | Facility: MEDICAL CENTER | Age: 79
End: 2019-05-08
Payer: COMMERCIAL

## 2019-05-08 VITALS
SYSTOLIC BLOOD PRESSURE: 102 MMHG | RESPIRATION RATE: 16 BRPM | TEMPERATURE: 98.3 F | WEIGHT: 226 LBS | HEIGHT: 65 IN | BODY MASS INDEX: 37.65 KG/M2 | DIASTOLIC BLOOD PRESSURE: 68 MMHG | HEART RATE: 62 BPM | OXYGEN SATURATION: 96 %

## 2019-05-08 DIAGNOSIS — Z12.39 SCREENING FOR BREAST CANCER: ICD-10-CM

## 2019-05-08 DIAGNOSIS — J30.2 SEASONAL ALLERGIES: ICD-10-CM

## 2019-05-08 DIAGNOSIS — I10 HYPERTENSION, UNSPECIFIED TYPE: Primary | ICD-10-CM

## 2019-05-08 DIAGNOSIS — E03.9 HYPOTHYROIDISM, UNSPECIFIED TYPE: ICD-10-CM

## 2019-05-08 LAB
T4 FREE SERPL-MCNC: 1.24 NG/DL (ref 0.76–1.46)
TSH SERPL DL<=0.05 MIU/L-ACNC: 0.97 UIU/ML (ref 0.36–3.74)

## 2019-05-08 PROCEDURE — 3074F SYST BP LT 130 MM HG: CPT | Performed by: INTERNAL MEDICINE

## 2019-05-08 PROCEDURE — 84439 ASSAY OF FREE THYROXINE: CPT

## 2019-05-08 PROCEDURE — 36415 COLL VENOUS BLD VENIPUNCTURE: CPT

## 2019-05-08 PROCEDURE — 3078F DIAST BP <80 MM HG: CPT | Performed by: INTERNAL MEDICINE

## 2019-05-08 PROCEDURE — 99214 OFFICE O/P EST MOD 30 MIN: CPT | Performed by: INTERNAL MEDICINE

## 2019-05-08 PROCEDURE — 84443 ASSAY THYROID STIM HORMONE: CPT

## 2019-05-08 RX ORDER — CYCLOSPORINE 0.5 MG/ML
1 EMULSION OPHTHALMIC 2 TIMES DAILY
COMMUNITY
End: 2019-10-11

## 2019-05-30 ENCOUNTER — HOSPITAL ENCOUNTER (OUTPATIENT)
Dept: RADIOLOGY | Age: 79
Discharge: HOME | End: 2019-05-30
Payer: COMMERCIAL

## 2019-05-30 VITALS — HEIGHT: 66 IN | BODY MASS INDEX: 35.36 KG/M2 | WEIGHT: 220 LBS

## 2019-05-30 DIAGNOSIS — Z12.39 SCREENING FOR BREAST CANCER: ICD-10-CM

## 2019-05-30 PROCEDURE — 77067 SCR MAMMO BI INCL CAD: CPT

## 2019-06-07 ENCOUNTER — HOSPITAL ENCOUNTER (OUTPATIENT)
Dept: RADIOLOGY | Age: 79
Discharge: HOME | End: 2019-06-07
Payer: COMMERCIAL

## 2019-06-07 DIAGNOSIS — Z13.820 SCREENING FOR OSTEOPOROSIS: ICD-10-CM

## 2019-06-07 PROCEDURE — 77080 DXA BONE DENSITY AXIAL: CPT

## 2019-06-14 ENCOUNTER — OFFICE VISIT (OUTPATIENT)
Dept: OBGYN CLINIC | Facility: MEDICAL CENTER | Age: 79
End: 2019-06-14
Payer: COMMERCIAL

## 2019-06-14 VITALS
WEIGHT: 219 LBS | HEART RATE: 65 BPM | BODY MASS INDEX: 35.2 KG/M2 | HEIGHT: 66 IN | SYSTOLIC BLOOD PRESSURE: 128 MMHG | RESPIRATION RATE: 18 BRPM | DIASTOLIC BLOOD PRESSURE: 76 MMHG

## 2019-06-14 DIAGNOSIS — G89.29 CHRONIC PAIN OF RIGHT KNEE: Primary | ICD-10-CM

## 2019-06-14 DIAGNOSIS — M17.11 PRIMARY OSTEOARTHRITIS OF RIGHT KNEE: ICD-10-CM

## 2019-06-14 DIAGNOSIS — M25.561 CHRONIC PAIN OF RIGHT KNEE: Primary | ICD-10-CM

## 2019-06-14 PROCEDURE — 99213 OFFICE O/P EST LOW 20 MIN: CPT | Performed by: ORTHOPAEDIC SURGERY

## 2019-06-14 PROCEDURE — 20610 DRAIN/INJ JOINT/BURSA W/O US: CPT | Performed by: ORTHOPAEDIC SURGERY

## 2019-06-14 RX ORDER — LIDOCAINE HYDROCHLORIDE 10 MG/ML
2 INJECTION, SOLUTION INFILTRATION; PERINEURAL
Status: COMPLETED | OUTPATIENT
Start: 2019-06-14 | End: 2019-06-14

## 2019-06-14 RX ORDER — BETAMETHASONE SODIUM PHOSPHATE AND BETAMETHASONE ACETATE 3; 3 MG/ML; MG/ML
12 INJECTION, SUSPENSION INTRA-ARTICULAR; INTRALESIONAL; INTRAMUSCULAR; SOFT TISSUE
Status: COMPLETED | OUTPATIENT
Start: 2019-06-14 | End: 2019-06-14

## 2019-06-14 RX ADMIN — LIDOCAINE HYDROCHLORIDE 2 ML: 10 INJECTION, SOLUTION INFILTRATION; PERINEURAL at 10:50

## 2019-06-14 RX ADMIN — BETAMETHASONE SODIUM PHOSPHATE AND BETAMETHASONE ACETATE 12 MG: 3; 3 INJECTION, SUSPENSION INTRA-ARTICULAR; INTRALESIONAL; INTRAMUSCULAR; SOFT TISSUE at 10:50

## 2019-08-06 DIAGNOSIS — E03.9 HYPOTHYROIDISM, UNSPECIFIED TYPE: ICD-10-CM

## 2019-08-06 RX ORDER — LEVOTHYROXINE SODIUM 0.12 MG/1
TABLET ORAL
Qty: 90 TABLET | Refills: 1 | Status: SHIPPED | OUTPATIENT
Start: 2019-08-06 | End: 2019-12-24 | Stop reason: SDUPTHER

## 2019-09-11 DIAGNOSIS — T78.40XD ALLERGIC DISORDER, SUBSEQUENT ENCOUNTER: ICD-10-CM

## 2019-09-11 DIAGNOSIS — I10 HYPERTENSION, UNSPECIFIED TYPE: ICD-10-CM

## 2019-09-12 RX ORDER — MONTELUKAST SODIUM 10 MG/1
10 TABLET ORAL DAILY
Qty: 90 TABLET | Refills: 1 | Status: SHIPPED | OUTPATIENT
Start: 2019-09-12 | End: 2020-03-04

## 2019-09-12 RX ORDER — LOSARTAN POTASSIUM 25 MG/1
TABLET ORAL
Qty: 90 TABLET | Refills: 4 | Status: SHIPPED | OUTPATIENT
Start: 2019-09-12 | End: 2020-11-17 | Stop reason: SDUPTHER

## 2019-09-16 ENCOUNTER — OFFICE VISIT (OUTPATIENT)
Dept: OBGYN CLINIC | Facility: MEDICAL CENTER | Age: 79
End: 2019-09-16
Payer: COMMERCIAL

## 2019-09-16 VITALS
BODY MASS INDEX: 35.2 KG/M2 | DIASTOLIC BLOOD PRESSURE: 80 MMHG | HEIGHT: 66 IN | WEIGHT: 219 LBS | SYSTOLIC BLOOD PRESSURE: 140 MMHG | HEART RATE: 65 BPM

## 2019-09-16 DIAGNOSIS — M18.11 PRIMARY OSTEOARTHRITIS OF FIRST CARPOMETACARPAL JOINT OF RIGHT HAND: Primary | ICD-10-CM

## 2019-09-16 PROCEDURE — 20600 DRAIN/INJ JOINT/BURSA W/O US: CPT | Performed by: ORTHOPAEDIC SURGERY

## 2019-09-16 PROCEDURE — 99213 OFFICE O/P EST LOW 20 MIN: CPT | Performed by: ORTHOPAEDIC SURGERY

## 2019-09-16 RX ORDER — LIDOCAINE HYDROCHLORIDE 10 MG/ML
2.5 INJECTION, SOLUTION INFILTRATION; PERINEURAL
Status: COMPLETED | OUTPATIENT
Start: 2019-09-16 | End: 2019-09-16

## 2019-09-16 RX ORDER — TRIAMCINOLONE ACETONIDE 40 MG/ML
20 INJECTION, SUSPENSION INTRA-ARTICULAR; INTRAMUSCULAR
Status: COMPLETED | OUTPATIENT
Start: 2019-09-16 | End: 2019-09-16

## 2019-09-16 RX ADMIN — TRIAMCINOLONE ACETONIDE 20 MG: 40 INJECTION, SUSPENSION INTRA-ARTICULAR; INTRAMUSCULAR at 11:01

## 2019-09-16 RX ADMIN — Medication 0.05 MEQ: at 11:01

## 2019-09-16 RX ADMIN — LIDOCAINE HYDROCHLORIDE 2.5 ML: 10 INJECTION, SOLUTION INFILTRATION; PERINEURAL at 11:01

## 2019-09-16 NOTE — PROGRESS NOTES
CHIEF COMPLAINT:  Chief Complaint   Patient presents with    Right Wrist - Follow-up       SUBJECTIVE:  Christopher Berry is a 70y o  year old  female who presents to the office for a follow up for right thumb CMC OA  Pt received a CSI for  right thumb CMC OA in 2/18/19 and 8/20/19 right wrist dequirvein tenosynovitis 8/6/19  Pt states that the pain it the base of her right thumb and swelling returned about 1 week ago  Pt states that otherwise she had long lasting relief from the CSI         PAST MEDICAL HISTORY:  Past Medical History:   Diagnosis Date    Seasonal allergies        PAST SURGICAL HISTORY:  Past Surgical History:   Procedure Laterality Date    ANKLE SURGERY      x2    APPENDECTOMY      CHOLECYSTECTOMY      laparoscopic    GALLBLADDER SURGERY      HYSTERECTOMY Bilateral     total abdominal with removal of both ovaries, age 39    INCISION TENDON SHEATH HAND      of a finger    NEUROPLASTY / TRANSPOSITION MEDIAN NERVE AT CARPAL TUNNEL      OOPHORECTOMY Bilateral     Age 39    TUBAL LIGATION         FAMILY HISTORY:  Family History   Problem Relation Age of Onset    Heart disease Mother         endocarditis    Heart disease Father     Prostate cancer Father 80    No Known Problems Sister     No Known Problems Daughter     No Known Problems Maternal Grandmother     No Known Problems Maternal Grandfather     Kidney cancer Paternal Grandmother     No Known Problems Paternal Grandfather     No Known Problems Sister     No Known Problems Son     No Known Problems Son     No Known Problems Son     No Known Problems Maternal Aunt     No Known Problems Maternal Aunt     No Known Problems Paternal Aunt     Breast cancer Paternal Aunt 62    No Known Problems Paternal Aunt     No Known Problems Paternal Aunt     Breast cancer Paternal Aunt 64       SOCIAL HISTORY:  Social History     Tobacco Use    Smoking status: Never Smoker    Smokeless tobacco: Never Used   Substance Use Topics    Alcohol use: Yes     Frequency: 2-3 times a week     Drinks per session: 1 or 2     Comment: wine with dinner; occasional use as per [employer]    Drug use: No       MEDICATIONS:    Current Outpatient Medications:     Aspirin (ASPIR-81 PO), Aspir-81 81 MG Oral Tablet Delayed Release TAKE 1 TABLET (81 MG TOTAL) BY MOUTH DAILY  Refills: 0    Joaquin Spine DO;  Started 27-May-2016 Active, Disp: , Rfl:     Biotin 1 MG CAPS, Biotin 1 MG Oral Capsule  Refills: 0    Joaquin Spine DO;  Started 27-May-2016 Active, Disp: , Rfl:     Coral Calcium 1000 (390 Ca) MG TABS, Coral Calcium 500 MG Oral Tablet  Refills: 0    Joaquin Spine DO;  Started 27-May-2016 Active, Disp: , Rfl:     cycloSPORINE (RESTASIS) 0 05 % ophthalmic emulsion, Administer 1 drop to both eyes 2 (two) times a day, Disp: , Rfl:     docusate sodium (COLACE) 100 mg capsule, Take by mouth 3 (three) times a day, Disp: , Rfl:     doxylamine (UNISON) 25 MG tablet, Sleep Aid 25 MG Oral Tablet  Refills: 0    Joaquin Spine DO;  Started 27-May-2016 Active, Disp: , Rfl:     Famotidine (PEPCID PO), Take 20 mg by mouth 2 (two) times a day  , Disp: , Rfl:     levothyroxine 125 mcg tablet, TAKE 1 TABLET DAILY IN THE EARLY MORNING CHECK LAB AFTER 6 WEEKS  IF LAB OKAY WILL DO 90 DAY SUPPLY  , Disp: 90 tablet, Rfl: 1    losartan (COZAAR) 25 mg tablet, TAKE 1 TABLET DAILY, Disp: 90 tablet, Rfl: 4    metoprolol succinate (TOPROL-XL) 50 mg 24 hr tablet, TAKE 1 TABLET DAILY, Disp: 90 tablet, Rfl: 3    montelukast (SINGULAIR) 10 mg tablet, Take 1 tablet (10 mg total) by mouth daily, Disp: 90 tablet, Rfl: 1    Multiple Vitamins-Minerals (MULTIVITAMIN ADULT PO), Take by mouth, Disp: , Rfl:     Omega-3 1000 MG CAPS, Take by mouth, Disp: , Rfl:     oxybutynin (DITROPAN-XL) 10 MG 24 hr tablet, TAKE 1 TABLET AT BEDTIME, Disp: 90 tablet, Rfl: 3    Psyllium (METAMUCIL FIBER PO), Take by mouth, Disp: , Rfl:     Red Yeast Rice 600 MG CAPS, Take by mouth, Disp: , Rfl: ALLERGIES:  Allergies   Allergen Reactions    Prednisone Tachycardia    Vicodin [Hydrocodone-Acetaminophen] Lightheadedness    Other Nasal Congestion     seasonal       REVIEW OF SYSTEMS:  Review of Systems   Constitutional: Negative for chills, fever and unexpected weight change  HENT: Negative for hearing loss, nosebleeds and sore throat  Eyes: Negative for pain, redness and visual disturbance  Respiratory: Negative for cough, shortness of breath and wheezing  Cardiovascular: Negative for chest pain, palpitations and leg swelling  Gastrointestinal: Negative for abdominal pain, nausea and vomiting  Endocrine: Negative for polydipsia and polyuria  Genitourinary: Negative for dysuria and hematuria  Skin: Negative for rash and wound  Neurological: Negative for dizziness, numbness and headaches  Psychiatric/Behavioral: Negative for decreased concentration, dysphoric mood and suicidal ideas  The patient is not nervous/anxious          VITALS:  Vitals:    09/16/19 1023   BP: 140/80   Pulse: 65       LABS:  HgA1c: No results found for: HGBA1C  BMP:   Lab Results   Component Value Date    CALCIUM 9 3 11/13/2018    K 4 9 11/13/2018    CO2 30 11/13/2018     11/13/2018    BUN 13 11/13/2018    CREATININE 0 94 11/13/2018       _____________________________________________________  PHYSICAL EXAMINATION:  General: well developed and well nourished, alert, oriented times 3 and appears comfortable  Psychiatric: Normal  HEENT: Trachea Midline, No torticollis  Pulmonary: No audible wheezing or respiratory distress   Skin: No masses, erythema, lacerations, fluctation, ulcerations  Neurovascular: Sensation Intact to the Median, Ulnar, Radial Nerve, Motor Intact to the Median, Ulnar, Radial Nerve and Pulses Intact    MUSCULOSKELETAL EXAMINATION:  right CMC Exam:  No adduction contracture  No hyperextension deformity of MCP joint  Positive localized tenderness over radial and dorsal aspect of thumb (CMC joint)  Grind test is Positive for pain and Positive for crepitus  No triggering or tenderness over the A1 pulley  No pain with Finkelsteins maneuver   No tenderness over the 1st extensor compartment       ___________________________________________________  STUDIES REVIEWED:  No studies reviewed  PROCEDURES PERFORMED:  Small joint arthrocentesis: R thumb CMC  Date/Time: 9/16/2019 11:01 AM  Consent given by: patient  Site marked: site marked  Timeout: Immediately prior to procedure a time out was called to verify the correct patient, procedure, equipment, support staff and site/side marked as required   Supporting Documentation  Indications: pain   Procedure Details  Location: thumb - R thumb CMC  Preparation: Patient was prepped and draped in the usual sterile fashion  Needle size: 27 G  Ultrasound guidance: no  Medications administered: 0 05 mEq sodium bicarbonate 8 4 %; 20 mg triamcinolone acetonide 40 mg/mL; 2 5 mL lidocaine 1 %    Patient tolerance: patient tolerated the procedure well with no immediate complications  Dressing:  Sterile dressing applied            _____________________________________________________  ASSESSMENT/PLAN:    Left CMC Osteoarthritis  *CSI was administered today without complications  *Pt was advised that they may have increased pain for the next 24-36 hours from the CSI before feeling relief, during this time pt was advised to take NSAIDs and apply ice  *After the initial 36 hours pt is advised to apply heat and continue motion of the thumb to decrease discomfort  *Pt was advised that activity modification such as resting after increased activity or taking NSAIDs prior to increased activity may be necessary    *Pt was advised that these CSI should not be administered more frequently than 3 months apart  *Pt will follow up in 2 weeks        Follow Up:  No follow-ups on file          To Do Next Visit:  Re-evaluation of current issue        Kathy Pinonation    I,:   Ronak Sheppard Karl am acting as a scribe while in the presence of the attending physician :        I,:   Joseph Shaw MD personally performed the services described in this documentation    as scribed in my presence :

## 2019-09-27 ENCOUNTER — OFFICE VISIT (OUTPATIENT)
Dept: OBGYN CLINIC | Facility: MEDICAL CENTER | Age: 79
End: 2019-09-27
Payer: COMMERCIAL

## 2019-09-27 VITALS
HEIGHT: 66 IN | DIASTOLIC BLOOD PRESSURE: 82 MMHG | WEIGHT: 219 LBS | HEART RATE: 58 BPM | SYSTOLIC BLOOD PRESSURE: 135 MMHG | BODY MASS INDEX: 35.2 KG/M2

## 2019-09-27 DIAGNOSIS — M17.11 PRIMARY OSTEOARTHRITIS OF RIGHT KNEE: Primary | ICD-10-CM

## 2019-09-27 DIAGNOSIS — G89.29 CHRONIC PAIN OF RIGHT KNEE: ICD-10-CM

## 2019-09-27 DIAGNOSIS — M25.561 CHRONIC PAIN OF RIGHT KNEE: ICD-10-CM

## 2019-09-27 PROCEDURE — 20610 DRAIN/INJ JOINT/BURSA W/O US: CPT | Performed by: ORTHOPAEDIC SURGERY

## 2019-09-27 RX ORDER — HYALURONATE SODIUM 10 MG/ML
20 SYRINGE (ML) INTRAARTICULAR
Status: COMPLETED | OUTPATIENT
Start: 2019-09-27 | End: 2019-09-27

## 2019-09-27 RX ADMIN — Medication 20 MG: at 15:08

## 2019-09-27 NOTE — PROGRESS NOTES
Assessment:  1  Primary osteoarthritis of right knee     2  Chronic pain of right knee         Plan:  The patient was provided with right knee Euflexxa #1 injection  She should follow up in one week for 2nd Euflexxa injection  To do next visit:  Return in about 1 week (around 10/4/2019)  The above stated was discussed in layman's terms and the patient expressed understanding  All questions were answered to the patient's satisfaction  Scribe Attestation    I,:   Alea Elders am acting as a scribe while in the presence of the attending physician :        I,:   Jerry Nuñez MD personally performed the services described in this documentation    as scribed in my presence :              Subjective:   Yakelin Rendon is a 70 y o  female who presents for follow up of right knee and 1st right knee Euflexxa injection  She is s/p right knee steroid injection 6/14/19 with long lasting benefit  Today she complains of generalized right knee pain  She rates her pain at 1-2/10  Kneeling aggravates          Review of systems negative unless otherwise specified in HPI    Past Medical History:   Diagnosis Date    Seasonal allergies        Past Surgical History:   Procedure Laterality Date    ANKLE SURGERY      x2    APPENDECTOMY      CHOLECYSTECTOMY      laparoscopic    GALLBLADDER SURGERY      HYSTERECTOMY Bilateral     total abdominal with removal of both ovaries, age 39    [address] [facility] [facility] HAND      of a finger    NEUROPLASTY / TRANSPOSITION MEDIAN NERVE AT CARPAL TUNNEL      OOPHORECTOMY Bilateral     Age 39    TUBAL LIGATION         Family History   Problem Relation Age of Onset    Heart disease Mother         endocarditis    Heart disease Father     Prostate cancer Father 80    No Known Problems Sister     No Known Problems Daughter     No Known Problems Maternal Grandmother     No Known Problems Maternal Grandfather     Kidney cancer Paternal Grandmother     No Known Problems Paternal Grandfather     No Known Problems Sister     No Known Problems Son     No Known Problems Son     No Known Problems Son     No Known Problems Maternal Aunt     No Known Problems Maternal Aunt     No Known Problems Paternal Aunt     Breast cancer Paternal Aunt 62    No Known Problems Paternal Aunt     No Known Problems Paternal [de-identified]     Breast cancer Paternal Aunt 64       Social History     Occupational History    Not on file   Tobacco Use    Smoking status: Never Smoker    Smokeless tobacco: Never Used   Substance and Sexual Activity    Alcohol use: Yes     Frequency: 2-3 times a week     Drinks per session: 1 or 2     Comment: wine with dinner; occasional use as per [employer]    Drug use: No    Sexual activity: Not on file         Current Outpatient Medications:     Aspirin (ASPIR-81 PO), Aspir-81 81 MG Oral Tablet Delayed Release TAKE 1 TABLET (81 MG TOTAL) BY MOUTH DAILY  Refills: 0    Tura Calender DO;  Started 27-May-2016 Active, Disp: , Rfl:     Biotin 1 MG CAPS, Biotin 1 MG Oral Capsule  Refills: 0    Tura Calender DO;  Started 27-May-2016 Active, Disp: , Rfl:     Coral Calcium 1000 (390 Ca) MG TABS, Coral Calcium 500 MG Oral Tablet  Refills: 0    Tura Calender DO;  Started 27-May-2016 Active, Disp: , Rfl:     cycloSPORINE (RESTASIS) 0 05 % ophthalmic emulsion, Administer 1 drop to both eyes 2 (two) times a day, Disp: , Rfl:     docusate sodium (COLACE) 100 mg capsule, Take by mouth 3 (three) times a day, Disp: , Rfl:     doxylamine (UNISON) 25 MG tablet, Sleep Aid 25 MG Oral Tablet  Refills: 0    Tura Calender DO;  Started 27-May-2016 Active, Disp: , Rfl:     Famotidine (PEPCID PO), Take 20 mg by mouth 2 (two) times a day  , Disp: , Rfl:     levothyroxine 125 mcg tablet, TAKE 1 TABLET DAILY IN THE EARLY MORNING CHECK LAB AFTER 6 WEEKS  IF LAB OKAY WILL DO 90 DAY SUPPLY  , Disp: 90 tablet, Rfl: 1    losartan (COZAAR) 25 mg tablet, TAKE 1 TABLET DAILY, Disp: 90 tablet, Rfl: 4    metoprolol succinate (TOPROL-XL) 50 mg 24 hr tablet, TAKE 1 TABLET DAILY, Disp: 90 tablet, Rfl: 3    montelukast (SINGULAIR) 10 mg tablet, Take 1 tablet (10 mg total) by mouth daily, Disp: 90 tablet, Rfl: 1    Multiple Vitamins-Minerals (MULTIVITAMIN ADULT PO), Take by mouth, Disp: , Rfl:     Omega-3 1000 MG CAPS, Take by mouth, Disp: , Rfl:     oxybutynin (DITROPAN-XL) 10 MG 24 hr tablet, TAKE 1 TABLET AT BEDTIME, Disp: 90 tablet, Rfl: 3    Psyllium (METAMUCIL FIBER PO), Take by mouth, Disp: , Rfl:     Red Yeast Rice 600 MG CAPS, Take by mouth, Disp: , Rfl:     Allergies   Allergen Reactions    Prednisone Tachycardia    Vicodin [Hydrocodone-Acetaminophen] Lightheadedness    Other Nasal Congestion     seasonal            Vitals:    09/27/19 1501   BP: 135/82   Pulse: 58       Objective:  Physical exam  · General: Awake, Alert, Oriented  · Eyes: Pupils equal, round and reactive to light  · Heart: regular rate and rhythm  · Lungs: No audible wheezing  · Abdomen: soft                    Ortho Exam   Right knee:  No erythema or ecchymosis  No effusion or swelling  Normal strength  Good ROM with crepitus    Calf compartments soft and supple  Sensation intact  Toes are warm sensate and mobile        Diagnostics, reviewed and taken today if performed as documented:    None performed     Procedures, if performed today:    Large joint arthrocentesis: R knee  Date/Time: 9/27/2019 3:08 PM  Consent given by: patient  Site marked: site marked  Timeout: Immediately prior to procedure a time out was called to verify the correct patient, procedure, equipment, support staff and site/side marked as required   Supporting Documentation  Indications: pain   Procedure Details  Location: knee - R knee  Preparation: Patient was prepped and draped in the usual sterile fashion  Needle size: 22 G  Ultrasound guidance: no  Approach: anterolateral  Medications administered: 20 mg Sodium Hyaluronate 20 MG/2ML    Patient tolerance: patient tolerated the procedure well with no immediate complications  Dressing:  Sterile dressing applied             Portions of the record may have been created with voice recognition software  Occasional wrong word or "sound a like" substitutions may have occurred due to the inherent limitations of voice recognition software  Read the chart carefully and recognize, using context, where substitutions have occurred

## 2019-10-04 ENCOUNTER — OFFICE VISIT (OUTPATIENT)
Dept: OBGYN CLINIC | Facility: MEDICAL CENTER | Age: 79
End: 2019-10-04
Payer: COMMERCIAL

## 2019-10-04 VITALS — SYSTOLIC BLOOD PRESSURE: 144 MMHG | RESPIRATION RATE: 18 BRPM | HEART RATE: 61 BPM | DIASTOLIC BLOOD PRESSURE: 82 MMHG

## 2019-10-04 DIAGNOSIS — M17.11 PRIMARY OSTEOARTHRITIS OF RIGHT KNEE: Primary | ICD-10-CM

## 2019-10-04 PROCEDURE — 20610 DRAIN/INJ JOINT/BURSA W/O US: CPT | Performed by: ORTHOPAEDIC SURGERY

## 2019-10-04 RX ORDER — HYALURONATE SODIUM 10 MG/ML
20 SYRINGE (ML) INTRAARTICULAR
Status: COMPLETED | OUTPATIENT
Start: 2019-10-04 | End: 2019-10-04

## 2019-10-04 RX ADMIN — Medication 20 MG: at 11:16

## 2019-10-04 NOTE — PROGRESS NOTES
Assessment:  1  Primary osteoarthritis of right knee  Large joint arthrocentesis       Plan:  Right knee Euflexxa injection, 2nd of 3 was administered today without difficulty  We will see the patient back in 1 week for 3rd Euflexxa injection  To do next visit:  Return in about 1 week (around 10/11/2019)  The above stated was discussed in layman's terms and the patient expressed understanding  All questions were answered to the patient's satisfaction  Subjective:   Figueroa Jordan is a 70 y o  female who presents for right knee Euflexxa injection, 2nd of 3  Patient reports she has noticed a difference in her pain since the 1st injection was administered last week  She states the only time she experiences pain now is when she is kneeling on the right knee          Review of systems negative unless otherwise specified in HPI    Past Medical History:   Diagnosis Date    Seasonal allergies        Past Surgical History:   Procedure Laterality Date    ANKLE SURGERY      x2    APPENDECTOMY      CHOLECYSTECTOMY      laparoscopic    GALLBLADDER SURGERY      HYSTERECTOMY Bilateral     total abdominal with removal of both ovaries, age 39    INCISION TENDON SHEATH HAND      of a finger    NEUROPLASTY / TRANSPOSITION MEDIAN NERVE AT CARPAL TUNNEL      OOPHORECTOMY Bilateral     Age 39    TUBAL LIGATION         Family History   Problem Relation Age of Onset    Heart disease Mother         endocarditis    Heart disease Father     Prostate cancer Father 80    No Known Problems Sister     No Known Problems Daughter     No Known Problems Maternal Grandmother     No Known Problems Maternal Grandfather     Kidney cancer Paternal Grandmother     No Known Problems Paternal Grandfather     No Known Problems Sister     No Known Problems Son     No Known Problems Son     No Known Problems Son     No Known Problems Maternal Aunt     No Known Problems Maternal Aunt     No Known Problems Paternal Aunt  Breast cancer Paternal Aunt 62    No Known Problems Paternal Aunt     No Known Problems Paternal Aunt     Breast cancer Paternal Aunt 64       Social History     Occupational History    Not on file   Tobacco Use    Smoking status: Never Smoker    Smokeless tobacco: Never Used   Substance and Sexual Activity    Alcohol use: Yes     Frequency: 2-3 times a week     Drinks per session: 1 or 2     Comment: wine with dinner; occasional use as per [employer]    Drug use: No    Sexual activity: Not on file         Current Outpatient Medications:     Aspirin (ASPIR-81 PO), Aspir-81 81 MG Oral Tablet Delayed Release TAKE 1 TABLET (81 MG TOTAL) BY MOUTH DAILY  Refills: 0    Cole Lofts DO;  Started 27-May-2016 Active, Disp: , Rfl:     Biotin 1 MG CAPS, Biotin 1 MG Oral Capsule  Refills: 0    Cole Lofts DO;  Started 27-May-2016 Active, Disp: , Rfl:     Coral Calcium 1000 (390 Ca) MG TABS, Coral Calcium 500 MG Oral Tablet  Refills: 0    Cole Lofts DO;  Started 27-May-2016 Active, Disp: , Rfl:     cycloSPORINE (RESTASIS) 0 05 % ophthalmic emulsion, Administer 1 drop to both eyes 2 (two) times a day, Disp: , Rfl:     docusate sodium (COLACE) 100 mg capsule, Take by mouth 3 (three) times a day, Disp: , Rfl:     doxylamine (UNISON) 25 MG tablet, Sleep Aid 25 MG Oral Tablet  Refills: 0    Cole Lofts DO;  Started 27-May-2016 Active, Disp: , Rfl:     Famotidine (PEPCID PO), Take 20 mg by mouth 2 (two) times a day  , Disp: , Rfl:     levothyroxine 125 mcg tablet, TAKE 1 TABLET DAILY IN THE EARLY MORNING CHECK LAB AFTER 6 WEEKS  IF LAB OKAY WILL DO 90 DAY SUPPLY  , Disp: 90 tablet, Rfl: 1    losartan (COZAAR) 25 mg tablet, TAKE 1 TABLET DAILY, Disp: 90 tablet, Rfl: 4    metoprolol succinate (TOPROL-XL) 50 mg 24 hr tablet, TAKE 1 TABLET DAILY, Disp: 90 tablet, Rfl: 3    montelukast (SINGULAIR) 10 mg tablet, Take 1 tablet (10 mg total) by mouth daily, Disp: 90 tablet, Rfl: 1    Multiple Vitamins-Minerals (MULTIVITAMIN ADULT PO), Take by mouth, Disp: , Rfl:     Omega-3 1000 MG CAPS, Take by mouth, Disp: , Rfl:     oxybutynin (DITROPAN-XL) 10 MG 24 hr tablet, TAKE 1 TABLET AT BEDTIME, Disp: 90 tablet, Rfl: 3    Psyllium (METAMUCIL FIBER PO), Take by mouth, Disp: , Rfl:     Red Yeast Rice 600 MG CAPS, Take by mouth, Disp: , Rfl:     Allergies   Allergen Reactions    Prednisone Tachycardia    Vicodin [Hydrocodone-Acetaminophen] Lightheadedness    Other Nasal Congestion     seasonal            Vitals:    10/04/19 1102   BP: 144/82   Pulse: 61   Resp: 18       Objective:  Physical exam  · General: Awake, Alert, Oriented  · Eyes: Pupils equal, round and reactive to light  · Heart: regular rate and rhythm  · Lungs: No audible wheezing  · Abdomen: soft                    Ortho Exam  Right knee exam:  Skin intact, no open wounds, erythema, ecchymosis  Patient has no effusion or swelling  Patient exhibits range of motion is 0 to 120° with patellofemoral crepitus  Her calf compartments are soft and compressible  Motor and sensory exams are grossly intact  Limb is warm and well perfused      Diagnostics, reviewed and taken today if performed as documented:    None performed    Procedures, if performed today:  Large joint arthrocentesis: R knee  Date/Time: 10/4/2019 11:16 AM  Consent given by: patient  Site marked: site marked  Timeout: Immediately prior to procedure a time out was called to verify the correct patient, procedure, equipment, support staff and site/side marked as required   Supporting Documentation  Indications: pain   Procedure Details  Location: knee - R knee  Preparation: Patient was prepped and draped in the usual sterile fashion  Needle size: 22 G  Ultrasound guidance: no  Approach: anterolateral  Medications administered: 20 mg Sodium Hyaluronate 20 MG/2ML    Patient tolerance: patient tolerated the procedure well with no immediate complications  Dressing:  Sterile dressing applied          None performed      Portions of the record may have been created with voice recognition software  Occasional wrong word or "sound a like" substitutions may have occurred due to the inherent limitations of voice recognition software  Read the chart carefully and recognize, using context, where substitutions have occurred

## 2019-10-11 ENCOUNTER — OFFICE VISIT (OUTPATIENT)
Dept: OBGYN CLINIC | Facility: MEDICAL CENTER | Age: 79
End: 2019-10-11
Payer: COMMERCIAL

## 2019-10-11 VITALS — WEIGHT: 219 LBS | BODY MASS INDEX: 35.2 KG/M2 | HEIGHT: 66 IN

## 2019-10-11 DIAGNOSIS — M25.561 CHRONIC PAIN OF RIGHT KNEE: ICD-10-CM

## 2019-10-11 DIAGNOSIS — G89.29 CHRONIC PAIN OF RIGHT KNEE: ICD-10-CM

## 2019-10-11 DIAGNOSIS — M17.11 PRIMARY OSTEOARTHRITIS OF RIGHT KNEE: Primary | ICD-10-CM

## 2019-10-11 PROCEDURE — 20610 DRAIN/INJ JOINT/BURSA W/O US: CPT | Performed by: ORTHOPAEDIC SURGERY

## 2019-10-11 RX ORDER — HYALURONATE SODIUM 10 MG/ML
20 SYRINGE (ML) INTRAARTICULAR
Status: COMPLETED | OUTPATIENT
Start: 2019-10-11 | End: 2019-10-11

## 2019-10-11 RX ADMIN — Medication 20 MG: at 11:20

## 2019-10-11 NOTE — PROGRESS NOTES
Assessment:   Diagnosis ICD-10-CM Associated Orders   1  Primary osteoarthritis of right knee M17 11 Large joint arthrocentesis: R knee   2  Chronic pain of right knee M25 561 Large joint arthrocentesis: R knee    G89 29        Plan:  Right knee known osteoarthritis  Patient presents today for her 3rd and last Euflexxa gel injection and the  patient's right knee tolerated the injection well  Ice and post injection protocol advised  Weightbearing activities as tolerated  To do next visit:  Return in about 3 months (around 1/11/2020) for re-check  The above stated was discussed in layman's terms and the patient expressed understanding  All questions were answered to the patient's satisfaction  Scribe Attestation    I,:   Faheem Warren am acting as a scribe while in the presence of the attending physician :        I,:   Bay Perez MD personally performed the services described in this documentation    as scribed in my presence :              Subjective:   Pippa Wilson is a 70 y o  female who presents for her 2rd and last Euflexxa injection to her right knee  Over the past 2 weeks with the previous 2 Visco injections her knee has already felt some improvement of her symptoms  She presents today for the 3rd and last installment of the gel injection        Review of systems negative unless otherwise specified in HPI    Past Medical History:   Diagnosis Date    Seasonal allergies        Past Surgical History:   Procedure Laterality Date    ANKLE SURGERY      x2    APPENDECTOMY      CHOLECYSTECTOMY      laparoscopic    GALLBLADDER SURGERY      HYSTERECTOMY Bilateral     total abdominal with removal of both ovaries, age 39    2002 [place] HAND      of a finger    NEUROPLASTY / TRANSPOSITION MEDIAN NERVE AT CARPAL TUNNEL      OOPHORECTOMY Bilateral     Age 39    TUBAL LIGATION         Family History   Problem Relation Age of Onset    Heart disease Mother         endocarditis    Heart disease Father     Prostate cancer Father 80    No Known Problems Sister     No Known Problems Daughter     No Known Problems Maternal Grandmother     No Known Problems Maternal Grandfather     Kidney cancer Paternal Grandmother     No Known Problems Paternal Grandfather     No Known Problems Sister     No Known Problems Son     No Known Problems Son     No Known Problems Son     No Known Problems Maternal Aunt     No Known Problems Maternal Aunt     No Known Problems Paternal Aunt     Breast cancer Paternal Aunt 62    No Known Problems Paternal Aunt     No Known Problems Paternal Aunt     Breast cancer Paternal Aunt 64       Social History     Occupational History    Not on file   Tobacco Use    Smoking status: Never Smoker    Smokeless tobacco: Never Used   Substance and Sexual Activity    Alcohol use: Yes     Frequency: 2-3 times a week     Drinks per session: 1 or 2     Comment: wine with dinner; occasional use as per [employer]    Drug use: No    Sexual activity: Not on file         Current Outpatient Medications:     Aspirin (ASPIR-81 PO), Aspir-81 81 MG Oral Tablet Delayed Release TAKE 1 TABLET (81 MG TOTAL) BY MOUTH DAILY    Refills: 0    Lerry Beka DO;  Started 27-May-2016 Active, Disp: , Rfl:     Biotin 1 MG CAPS, Biotin 1 MG Oral Capsule  Refills: 0    Lerry Beka DO;  Started 27-May-2016 Active, Disp: , Rfl:     Coral Calcium 1000 (390 Ca) MG TABS, Coral Calcium 500 MG Oral Tablet  Refills: 0    Lerry Beka DO;  Started 27-May-2016 Active, Disp: , Rfl:     docusate sodium (COLACE) 100 mg capsule, Take by mouth 3 (three) times a day, Disp: , Rfl:     doxylamine (UNISON) 25 MG tablet, Sleep Aid 25 MG Oral Tablet  Refills: 0    Lerry Beka DO;  Started 27-May-2016 Active, Disp: , Rfl:     Famotidine (PEPCID PO), Take 20 mg by mouth 2 (two) times a day  , Disp: , Rfl:     levothyroxine 125 mcg tablet, TAKE 1 TABLET DAILY IN THE EARLY MORNING CHECK LAB AFTER 6 WEEKS  IF LAB OKAY WILL DO 90 DAY SUPPLY  , Disp: 90 tablet, Rfl: 1    losartan (COZAAR) 25 mg tablet, TAKE 1 TABLET DAILY, Disp: 90 tablet, Rfl: 4    metoprolol succinate (TOPROL-XL) 50 mg 24 hr tablet, TAKE 1 TABLET DAILY, Disp: 90 tablet, Rfl: 3    montelukast (SINGULAIR) 10 mg tablet, Take 1 tablet (10 mg total) by mouth daily, Disp: 90 tablet, Rfl: 1    Multiple Vitamins-Minerals (MULTIVITAMIN ADULT PO), Take by mouth, Disp: , Rfl:     Omega-3 1000 MG CAPS, Take by mouth, Disp: , Rfl:     oxybutynin (DITROPAN-XL) 10 MG 24 hr tablet, TAKE 1 TABLET AT BEDTIME, Disp: 90 tablet, Rfl: 3    Psyllium (METAMUCIL FIBER PO), Take by mouth, Disp: , Rfl:     Red Yeast Rice 600 MG CAPS, Take by mouth, Disp: , Rfl:     Allergies   Allergen Reactions    Prednisone Tachycardia    Vicodin [Hydrocodone-Acetaminophen] Lightheadedness    Other Nasal Congestion     seasonal          There were no vitals filed for this visit  Objective:                    Right Knee Exam     Muscle Strength   The patient has normal right knee strength  Tenderness   Right knee tenderness location: minimal but improved  Range of Motion   The patient has normal right knee ROM  Right knee flexion: with less crepitus  Other   Erythema: absent  Sensation: normal  Swelling: mild  Effusion: no effusion present    Comments:    Bony enlargement both medially and laterally              Diagnostics, reviewed and taken today if performed as documented:    None performed          Procedures, if performed today:    Large joint arthrocentesis: R knee  Date/Time: 10/11/2019 11:20 AM  Consent given by: patient  Site marked: site marked  Timeout: Immediately prior to procedure a time out was called to verify the correct patient, procedure, equipment, support staff and site/side marked as required   Supporting Documentation  Indications: pain and diagnostic evaluation   Procedure Details  Location: knee - R knee  Preparation: Patient was prepped and draped in the usual sterile fashion  Needle size: 22 G  Ultrasound guidance: no  Approach: anterolateral  Medications administered: 20 mg Sodium Hyaluronate 20 MG/2ML  Specialty Pharmacy Supplied: received medications from pharmacy  Patient tolerance: patient tolerated the procedure well with no immediate complications  Dressing:  Sterile dressing applied              Portions of the record may have been created with voice recognition software  Occasional wrong word or "sound a like" substitutions may have occurred due to the inherent limitations of voice recognition software  Read the chart carefully and recognize, using context, where substitutions have occurred

## 2019-11-08 ENCOUNTER — OFFICE VISIT (OUTPATIENT)
Dept: FAMILY MEDICINE CLINIC | Facility: CLINIC | Age: 79
End: 2019-11-08
Payer: COMMERCIAL

## 2019-11-08 VITALS
DIASTOLIC BLOOD PRESSURE: 72 MMHG | HEART RATE: 67 BPM | RESPIRATION RATE: 16 BRPM | SYSTOLIC BLOOD PRESSURE: 120 MMHG | HEIGHT: 66 IN | OXYGEN SATURATION: 97 % | WEIGHT: 225 LBS | TEMPERATURE: 97.4 F | BODY MASS INDEX: 36.16 KG/M2

## 2019-11-08 DIAGNOSIS — R53.83 FATIGUE, UNSPECIFIED TYPE: ICD-10-CM

## 2019-11-08 DIAGNOSIS — E66.9 CLASS 2 OBESITY WITH BODY MASS INDEX (BMI) OF 36.0 TO 36.9 IN ADULT, UNSPECIFIED OBESITY TYPE, UNSPECIFIED WHETHER SERIOUS COMORBIDITY PRESENT: ICD-10-CM

## 2019-11-08 DIAGNOSIS — E03.9 HYPOTHYROIDISM, UNSPECIFIED TYPE: ICD-10-CM

## 2019-11-08 DIAGNOSIS — I10 HYPERTENSION, UNSPECIFIED TYPE: Primary | ICD-10-CM

## 2019-11-08 PROCEDURE — 99213 OFFICE O/P EST LOW 20 MIN: CPT | Performed by: INTERNAL MEDICINE

## 2019-11-08 PROCEDURE — 1160F RVW MEDS BY RX/DR IN RCRD: CPT | Performed by: INTERNAL MEDICINE

## 2019-11-08 PROCEDURE — 1036F TOBACCO NON-USER: CPT | Performed by: INTERNAL MEDICINE

## 2019-11-08 PROCEDURE — 1101F PT FALLS ASSESS-DOCD LE1/YR: CPT | Performed by: INTERNAL MEDICINE

## 2019-11-08 PROCEDURE — 3725F SCREEN DEPRESSION PERFORMED: CPT | Performed by: INTERNAL MEDICINE

## 2019-11-08 NOTE — PROGRESS NOTES
Assessment/Plan:         Diagnoses and all orders for this visit:    Hypertension, unspecified type  Comments:  stable    Hypothyroidism, unspecified type  Comments:  due for lab  Orders:  -     TSH, 3rd generation with Free T4 reflex; Future    Class 2 obesity with body mass index (BMI) of 36 0 to 36 9 in adult, unspecified obesity type, unspecified whether serious comorbidity present  Comments:  rec reduce  Orders:  -     Comprehensive metabolic panel; Future  -     Lipid panel; Future    Fatigue, unspecified type  Comments:  due for lab  Orders:  -     CBC; Future          Subjective:      Patient ID: Norma Carrasquillo is a 70 y o  female  Pt in for bp check  Does not need refills  Denies cp/so/h/a      The following portions of the patient's history were reviewed and updated as appropriate: She  has a past medical history of Seasonal allergies  She   Patient Active Problem List    Diagnosis Date Noted    Seasonal allergies 05/08/2019    Primary osteoarthritis of right knee 06/08/2018    Chronic pain of right knee 06/08/2018    Hypertension 01/24/2018    Hypothyroidism 01/24/2018    Overactive bladder 01/24/2018    Palpitations 01/24/2018    EKG, abnormal 01/24/2018     She  has a past surgical history that includes Gallbladder surgery; Appendectomy; Ankle surgery; Cholecystectomy; Incision tendon sheath hand; Neuroplasty / transposition median nerve at carpal tunnel; Tubal ligation; Oophorectomy (Bilateral); and Hysterectomy (Bilateral)  Her family history includes Breast cancer (age of onset: 64) in her paternal aunt; Breast cancer (age of onset: 62) in her paternal aunt;  Heart disease in her father and mother; Kidney cancer in her paternal grandmother; No Known Problems in her daughter, maternal aunt, maternal aunt, maternal grandfather, maternal grandmother, paternal aunt, paternal aunt, paternal aunt, paternal grandfather, sister, sister, son, son, and son; Prostate cancer (age of onset: 80) in her father  She  reports that she has never smoked  She has never used smokeless tobacco  She reports that she drinks alcohol  She reports that she does not use drugs  Current Outpatient Medications   Medication Sig Dispense Refill    Aspirin (ASPIR-81 PO) Aspir-81 81 MG Oral Tablet Delayed Release  TAKE 1 TABLET (81 MG TOTAL) BY MOUTH DAILY  Refills: 0       Owen Peasant DO;  Started 27-May-2016  Active      Biotin 1 MG CAPS Biotin 1 MG Oral Capsule   Refills: 0       Owen Peasant DO;  Started 27-May-2016  Active      Coral Calcium 1000 (390 Ca) MG TABS Coral Calcium 500 MG Oral Tablet   Refills: 0       Owen Peasant DO;  Started 27-May-2016  Active      docusate sodium (COLACE) 100 mg capsule Take by mouth 3 (three) times a day      doxylamine (UNISON) 25 MG tablet Sleep Aid 25 MG Oral Tablet   Refills: 0       Owen Peasant DO;  Started 27-May-2016  Active      Famotidine (PEPCID PO) Take 20 mg by mouth 2 (two) times a day        levothyroxine 125 mcg tablet TAKE 1 TABLET DAILY IN THE EARLY MORNING CHECK LAB AFTER 6 WEEKS  IF LAB OKAY WILL DO 90 DAY SUPPLY  90 tablet 1    losartan (COZAAR) 25 mg tablet TAKE 1 TABLET DAILY 90 tablet 4    metoprolol succinate (TOPROL-XL) 50 mg 24 hr tablet TAKE 1 TABLET DAILY 90 tablet 3    montelukast (SINGULAIR) 10 mg tablet Take 1 tablet (10 mg total) by mouth daily 90 tablet 1    Multiple Vitamins-Minerals (MULTIVITAMIN ADULT PO) Take by mouth      Omega-3 1000 MG CAPS Take by mouth      oxybutynin (DITROPAN-XL) 10 MG 24 hr tablet TAKE 1 TABLET AT BEDTIME 90 tablet 3    Psyllium (METAMUCIL FIBER PO) Take by mouth      Red Yeast Rice 600 MG CAPS Take by mouth       No current facility-administered medications for this visit  Current Outpatient Medications on File Prior to Visit   Medication Sig    Aspirin (ASPIR-81 PO) Aspir-81 81 MG Oral Tablet Delayed Release  TAKE 1 TABLET (81 MG TOTAL) BY MOUTH DAILY     Refills: 0       Owen Peasant DO;  Started 27-May-2016  Active    Biotin 1 MG CAPS Biotin 1 MG Oral Capsule   Refills: 0       Chaparrita Lee DO;  Started 27-May-2016  Active    Coral Calcium 1000 (390 Ca) MG TABS Coral Calcium 500 MG Oral Tablet   Refills: 0       Chaparrita Lee DO;  Started 27-May-2016  Active    docusate sodium (COLACE) 100 mg capsule Take by mouth 3 (three) times a day    doxylamine (UNISON) 25 MG tablet Sleep Aid 25 MG Oral Tablet   Refills: 0       Chaparrita Lee DO;  Started 27-May-2016  Active    Famotidine (PEPCID PO) Take 20 mg by mouth 2 (two) times a day      levothyroxine 125 mcg tablet TAKE 1 TABLET DAILY IN THE EARLY MORNING CHECK LAB AFTER 6 WEEKS  IF LAB OKAY WILL DO 90 DAY SUPPLY   losartan (COZAAR) 25 mg tablet TAKE 1 TABLET DAILY    metoprolol succinate (TOPROL-XL) 50 mg 24 hr tablet TAKE 1 TABLET DAILY    montelukast (SINGULAIR) 10 mg tablet Take 1 tablet (10 mg total) by mouth daily    Multiple Vitamins-Minerals (MULTIVITAMIN ADULT PO) Take by mouth    Omega-3 1000 MG CAPS Take by mouth    oxybutynin (DITROPAN-XL) 10 MG 24 hr tablet TAKE 1 TABLET AT BEDTIME    Psyllium (METAMUCIL FIBER PO) Take by mouth    Red Yeast Rice 600 MG CAPS Take by mouth     No current facility-administered medications on file prior to visit  She is allergic to prednisone; vicodin [hydrocodone-acetaminophen]; and other       Review of Systems   Constitutional: Negative  HENT: Negative  Respiratory: Negative  Cardiovascular: Negative  Gastrointestinal: Negative  Genitourinary: Negative  Objective:      /72 (BP Location: Right arm, Patient Position: Sitting, Cuff Size: Large)   Pulse 67   Temp (!) 97 4 °F (36 3 °C) (Temporal)   Resp 16   Ht 5' 6" (1 676 m)   Wt 102 kg (225 lb)   SpO2 97%   BMI 36 32 kg/m²          Physical Exam   Constitutional: She appears well-developed and well-nourished  No distress  HENT:   Head: Normocephalic and atraumatic     Right Ear: External ear normal    Left Ear: External ear normal    Nose: Nose normal    Mouth/Throat: Oropharynx is clear and moist  No oropharyngeal exudate  Neck: Normal range of motion  Neck supple  No tracheal deviation present  No thyromegaly present  Cardiovascular: Normal rate, regular rhythm, normal heart sounds and intact distal pulses  Exam reveals no gallop and no friction rub  No murmur heard  Pulmonary/Chest: Effort normal and breath sounds normal  No respiratory distress  She has no wheezes  She has no rales  Lymphadenopathy:     She has no cervical adenopathy  Skin: She is not diaphoretic

## 2019-11-21 ENCOUNTER — APPOINTMENT (OUTPATIENT)
Dept: LAB | Age: 79
End: 2019-11-21
Payer: COMMERCIAL

## 2019-11-21 DIAGNOSIS — R53.83 FATIGUE, UNSPECIFIED TYPE: ICD-10-CM

## 2019-11-21 DIAGNOSIS — E03.9 HYPOTHYROIDISM, UNSPECIFIED TYPE: ICD-10-CM

## 2019-11-21 DIAGNOSIS — E66.9 CLASS 2 OBESITY WITH BODY MASS INDEX (BMI) OF 36.0 TO 36.9 IN ADULT, UNSPECIFIED OBESITY TYPE, UNSPECIFIED WHETHER SERIOUS COMORBIDITY PRESENT: ICD-10-CM

## 2019-11-21 LAB
ALBUMIN SERPL BCP-MCNC: 3.9 G/DL (ref 3.5–5)
ALP SERPL-CCNC: 62 U/L (ref 46–116)
ALT SERPL W P-5'-P-CCNC: 32 U/L (ref 12–78)
ANION GAP SERPL CALCULATED.3IONS-SCNC: 9 MMOL/L (ref 4–13)
AST SERPL W P-5'-P-CCNC: 23 U/L (ref 5–45)
BILIRUB SERPL-MCNC: 0.48 MG/DL (ref 0.2–1)
BUN SERPL-MCNC: 15 MG/DL (ref 5–25)
CALCIUM SERPL-MCNC: 9.2 MG/DL (ref 8.3–10.1)
CHLORIDE SERPL-SCNC: 111 MMOL/L (ref 100–108)
CHOLEST SERPL-MCNC: 218 MG/DL (ref 50–200)
CO2 SERPL-SCNC: 23 MMOL/L (ref 21–32)
CREAT SERPL-MCNC: 0.78 MG/DL (ref 0.6–1.3)
ERYTHROCYTE [DISTWIDTH] IN BLOOD BY AUTOMATED COUNT: 12.9 % (ref 11.6–15.1)
GFR SERPL CREATININE-BSD FRML MDRD: 74 ML/MIN/1.73SQ M
GLUCOSE P FAST SERPL-MCNC: 90 MG/DL (ref 65–99)
HCT VFR BLD AUTO: 48.1 % (ref 34.8–46.1)
HDLC SERPL-MCNC: 75 MG/DL
HGB BLD-MCNC: 15.9 G/DL (ref 11.5–15.4)
LDLC SERPL CALC-MCNC: 120 MG/DL (ref 0–100)
MCH RBC QN AUTO: 34 PG (ref 26.8–34.3)
MCHC RBC AUTO-ENTMCNC: 33.1 G/DL (ref 31.4–37.4)
MCV RBC AUTO: 103 FL (ref 82–98)
NONHDLC SERPL-MCNC: 143 MG/DL
PLATELET # BLD AUTO: 207 THOUSANDS/UL (ref 149–390)
PMV BLD AUTO: 12.8 FL (ref 8.9–12.7)
POTASSIUM SERPL-SCNC: 4.4 MMOL/L (ref 3.5–5.3)
PROT SERPL-MCNC: 7.2 G/DL (ref 6.4–8.2)
RBC # BLD AUTO: 4.67 MILLION/UL (ref 3.81–5.12)
SODIUM SERPL-SCNC: 143 MMOL/L (ref 136–145)
TRIGL SERPL-MCNC: 114 MG/DL
TSH SERPL DL<=0.05 MIU/L-ACNC: 3.2 UIU/ML (ref 0.36–3.74)
WBC # BLD AUTO: 4.84 THOUSAND/UL (ref 4.31–10.16)

## 2019-11-21 PROCEDURE — 85027 COMPLETE CBC AUTOMATED: CPT

## 2019-11-21 PROCEDURE — 36415 COLL VENOUS BLD VENIPUNCTURE: CPT

## 2019-11-21 PROCEDURE — 80061 LIPID PANEL: CPT

## 2019-11-21 PROCEDURE — 84443 ASSAY THYROID STIM HORMONE: CPT

## 2019-11-21 PROCEDURE — 80053 COMPREHEN METABOLIC PANEL: CPT

## 2019-12-23 DIAGNOSIS — E03.9 HYPOTHYROIDISM, UNSPECIFIED TYPE: Primary | ICD-10-CM

## 2019-12-23 DIAGNOSIS — I10 ESSENTIAL HYPERTENSION: ICD-10-CM

## 2019-12-23 DIAGNOSIS — N32.81 OVERACTIVE BLADDER: ICD-10-CM

## 2019-12-23 RX ORDER — METOPROLOL SUCCINATE 50 MG/1
TABLET, EXTENDED RELEASE ORAL
Qty: 90 TABLET | Refills: 4 | Status: SHIPPED | OUTPATIENT
Start: 2019-12-23 | End: 2019-12-23 | Stop reason: SDUPTHER

## 2019-12-23 RX ORDER — LEVOTHYROXINE SODIUM 112 UG/1
TABLET ORAL
Qty: 90 TABLET | Refills: 4 | Status: SHIPPED | OUTPATIENT
Start: 2019-12-23 | End: 2019-12-31

## 2019-12-23 RX ORDER — METOPROLOL SUCCINATE 50 MG/1
50 TABLET, EXTENDED RELEASE ORAL DAILY
Qty: 90 TABLET | Refills: 1 | Status: SHIPPED | OUTPATIENT
Start: 2019-12-23 | End: 2020-06-12

## 2019-12-23 RX ORDER — OXYBUTYNIN CHLORIDE 10 MG/1
10 TABLET, EXTENDED RELEASE ORAL
Qty: 90 TABLET | Refills: 1 | Status: SHIPPED | OUTPATIENT
Start: 2019-12-23 | End: 2020-06-12

## 2019-12-24 DIAGNOSIS — E03.9 HYPOTHYROIDISM, UNSPECIFIED TYPE: ICD-10-CM

## 2019-12-27 RX ORDER — LEVOTHYROXINE SODIUM 0.12 MG/1
125 TABLET ORAL DAILY
Qty: 90 TABLET | Refills: 1 | Status: SHIPPED | OUTPATIENT
Start: 2019-12-27 | End: 2020-07-29 | Stop reason: SDUPTHER

## 2019-12-31 ENCOUNTER — OFFICE VISIT (OUTPATIENT)
Dept: FAMILY MEDICINE CLINIC | Facility: CLINIC | Age: 79
End: 2019-12-31
Payer: COMMERCIAL

## 2019-12-31 VITALS
RESPIRATION RATE: 16 BRPM | TEMPERATURE: 97.8 F | BODY MASS INDEX: 37.15 KG/M2 | HEIGHT: 65 IN | DIASTOLIC BLOOD PRESSURE: 72 MMHG | OXYGEN SATURATION: 97 % | HEART RATE: 70 BPM | SYSTOLIC BLOOD PRESSURE: 118 MMHG | WEIGHT: 223 LBS

## 2019-12-31 DIAGNOSIS — H60.92 OTITIS EXTERNA OF LEFT EAR, UNSPECIFIED CHRONICITY, UNSPECIFIED TYPE: ICD-10-CM

## 2019-12-31 DIAGNOSIS — Z00.00 MEDICARE ANNUAL WELLNESS VISIT, SUBSEQUENT: Primary | ICD-10-CM

## 2019-12-31 PROCEDURE — G0439 PPPS, SUBSEQ VISIT: HCPCS | Performed by: INTERNAL MEDICINE

## 2019-12-31 PROCEDURE — 99213 OFFICE O/P EST LOW 20 MIN: CPT | Performed by: INTERNAL MEDICINE

## 2019-12-31 RX ORDER — CIPROFLOXACIN AND DEXAMETHASONE 3; 1 MG/ML; MG/ML
4 SUSPENSION/ DROPS AURICULAR (OTIC) 2 TIMES DAILY
Qty: 7.5 ML | Refills: 0 | Status: SHIPPED | OUTPATIENT
Start: 2019-12-31 | End: 2020-05-12

## 2019-12-31 NOTE — PATIENT INSTRUCTIONS
Medicare Preventive Visit Patient Instructions  Thank you for completing your Welcome to Medicare Visit or Medicare Annual Wellness Visit today  Your next wellness visit will be due in one year (12/31/2020)  The screening/preventive services that you may require over the next 5-10 years are detailed below  Some tests may not apply to you based off risk factors and/or age  Screening tests ordered at today's visit but not completed yet may show as past due  Also, please note that scanned in results may not display below  Preventive Screenings:  Service Recommendations Previous Testing/Comments   Colorectal Cancer Screening  * Colonoscopy    * Fecal Occult Blood Test (FOBT)/Fecal Immunochemical Test (FIT)  * Fecal DNA/Cologuard Test  * Flexible Sigmoidoscopy Age: 54-65 years old   Colonoscopy: every 10 years (may be performed more frequently if at higher risk)  OR  FOBT/FIT: every 1 year  OR  Cologuard: every 3 years  OR  Sigmoidoscopy: every 5 years  Screening may be recommended earlier than age 48 if at higher risk for colorectal cancer  Also, an individualized decision between you and your healthcare provider will decide whether screening between the ages of 74-80 would be appropriate  Colonoscopy: Not on file  FOBT/FIT: Not on file  Cologuard: Not on file  Sigmoidoscopy: Not on file         Breast Cancer Screening Age: 36 years old  Frequency: every 1-2 years  Not required if history of left and right mastectomy Mammogram: 05/30/2019    Screening Current   Cervical Cancer Screening Between the ages of 21-29, pap smear recommended once every 3 years  Between the ages of 33-67, can perform pap smear with HPV co-testing every 5 years     Recommendations may differ for women with a history of total hysterectomy, cervical cancer, or abnormal pap smears in past  Pap Smear: Not on file    Screening Not Indicated   Hepatitis C Screening Once for adults born between 1945 and 1965  More frequently in patients at high risk for Hepatitis C Hep C Antibody: Not on file       Diabetes Screening 1-2 times per year if you're at risk for diabetes or have pre-diabetes Fasting glucose: 90 mg/dL   A1C: No results in last 5 years    Screening Current   Cholesterol Screening Once every 5 years if you don't have a lipid disorder  May order more often based on risk factors  Lipid panel: 11/21/2019    Screening Current     Other Preventive Screenings Covered by Medicare:  1  Abdominal Aortic Aneurysm (AAA) Screening: covered once if your at risk  You're considered to be at risk if you have a family history of AAA  2  Lung Cancer Screening: covers low dose CT scan once per year if you meet all of the following conditions: (1) Age 50-69; (2) No signs or symptoms of lung cancer; (3) Current smoker or have quit smoking within the last 15 years; (4) You have a tobacco smoking history of at least 30 pack years (packs per day multiplied by number of years you smoked); (5) You get a written order from a healthcare provider  3  Glaucoma Screening: covered annually if you're considered high risk: (1) You have diabetes OR (2) Family history of glaucoma OR (3)  aged 48 and older OR (3)  American aged 72 and older  3  Osteoporosis Screening: covered every 2 years if you meet one of the following conditions: (1) You're estrogen deficient and at risk for osteoporosis based off medical history and other findings; (2) Have a vertebral abnormality; (3) On glucocorticoid therapy for more than 3 months; (4) Have primary hyperparathyroidism; (5) On osteoporosis medications and need to assess response to drug therapy  · Last bone density test (DXA Scan): 06/07/2019   5  HIV Screening: covered annually if you're between the age of 15-65  Also covered annually if you are younger than 13 and older than 72 with risk factors for HIV infection  For pregnant patients, it is covered up to 3 times per pregnancy      Immunizations:  Immunization Recommendations   Influenza Vaccine Annual influenza vaccination during flu season is recommended for all persons aged >= 6 months who do not have contraindications   Pneumococcal Vaccine (Prevnar and Pneumovax)  * Prevnar = PCV13  * Pneumovax = PPSV23   Adults 25-60 years old: 1-3 doses may be recommended based on certain risk factors  Adults 72 years old: Prevnar (PCV13) vaccine recommended followed by Pneumovax (PPSV23) vaccine  If already received PPSV23 since turning 65, then PCV13 recommended at least one year after PPSV23 dose  Hepatitis B Vaccine 3 dose series if at intermediate or high risk (ex: diabetes, end stage renal disease, liver disease)   Tetanus (Td) Vaccine - COST NOT COVERED BY MEDICARE PART B Following completion of primary series, a booster dose should be given every 10 years to maintain immunity against tetanus  Td may also be given as tetanus wound prophylaxis  Tdap Vaccine - COST NOT COVERED BY MEDICARE PART B Recommended at least once for all adults  For pregnant patients, recommended with each pregnancy  Shingles Vaccine (Shingrix) - COST NOT COVERED BY MEDICARE PART B  2 shot series recommended in those aged 48 and above     Health Maintenance Due:      Topic Date Due    DXA SCAN  06/07/2021     Immunizations Due:  There are no preventive care reminders to display for this patient  Advance Directives   What are advance directives? Advance directives are legal documents that state your wishes and plans for medical care  These plans are made ahead of time in case you lose your ability to make decisions for yourself  Advance directives can apply to any medical decision, such as the treatments you want, and if you want to donate organs  What are the types of advance directives? There are many types of advance directives, and each state has rules about how to use them  You may choose a combination of any of the following:  · Living will:   This is a written record of the treatment you want  You can also choose which treatments you do not want, which to limit, and which to stop at a certain time  This includes surgery, medicine, IV fluid, and tube feedings  · Durable power of  for healthcare [facility]): This is a written record that states who you want to make healthcare choices for you when you are unable to make them for yourself  This person, called a proxy, is usually a family member or a friend  You may choose more than 1 proxy  · Do not resuscitate (DNR) order:  A DNR order is used in case your heart stops beating or you stop breathing  It is a request not to have certain forms of treatment, such as CPR  A DNR order may be included in other types of advance directives  · Medical directive: This covers the care that you want if you are in a coma, near death, or unable to make decisions for yourself  You can list the treatments you want for each condition  Treatment may include pain medicine, surgery, blood transfusions, dialysis, IV or tube feedings, and a ventilator (breathing machine)  · Values history: This document has questions about your views, beliefs, and how you feel and think about life  This information can help others choose the care that you would choose  Why are advance directives important? An advance directive helps you control your care  Although spoken wishes may be used, it is better to have your wishes written down  Spoken wishes can be misunderstood, or not followed  Treatments may be given even if you do not want them  An advance directive may make it easier for your family to make difficult choices about your care  Urinary Incontinence   Urinary incontinence (UI)  is when you lose control of your bladder  UI develops because your bladder cannot store or empty urine properly  The 3 most common types of UI are stress incontinence, urge incontinence, or both  Medicines:   · May be given to help strengthen your bladder control   Report any side effects of medication to your healthcare provider  Do pelvic muscle exercises often:  Your pelvic muscles help you stop urinating  Squeeze these muscles tight for 5 seconds, then relax for 5 seconds  Gradually work up to squeezing for 10 seconds  Do 3 sets of 15 repetitions a day, or as directed  This will help strengthen your pelvic muscles and improve bladder control  Train your bladder:  Go to the bathroom at set times, such as every 2 hours, even if you do not feel the urge to go  You can also try to hold your urine when you feel the urge to go  For example, hold your urine for 5 minutes when you feel the urge to go  As that becomes easier, hold your urine for 10 minutes  Self-care:   · Keep a UI record  Write down how often you leak urine and how much you leak  Make a note of what you were doing when you leaked urine  · Drink liquids as directed  You may need to limit the amount of liquid you drink to help control your urine leakage  Do not drink any liquid right before you go to bed  Limit or do not have drinks that contain caffeine or alcohol  · Prevent constipation  Eat a variety of high-fiber foods  Good examples are high-fiber cereals, beans, vegetables, and whole-grain breads  Walking is the best way to trigger your intestines to have a bowel movement  · Exercise regularly and maintain a healthy weight  Weight loss and exercise will decrease pressure on your bladder and help you control your leakage  · Use a catheter as directed  to help empty your bladder  A catheter is a tiny, plastic tube that is put into your bladder to drain your urine  · Go to behavior therapy as directed  Behavior therapy may be used to help you learn to control your urge to urinate  Weight Management   Why it is important to manage your weight:  Being overweight increases your risk of health conditions such as heart disease, high blood pressure, type 2 diabetes, and certain types of cancer   It can also increase your risk for osteoarthritis, sleep apnea, and other respiratory problems  Aim for a slow, steady weight loss  Even a small amount of weight loss can lower your risk of health problems  How to lose weight safely:  A safe and healthy way to lose weight is to eat fewer calories and get regular exercise  You can lose up about 1 pound a week by decreasing the number of calories you eat by 500 calories each day  Healthy meal plan for weight management:  A healthy meal plan includes a variety of foods, contains fewer calories, and helps you stay healthy  A healthy meal plan includes the following:  · Eat whole-grain foods more often  A healthy meal plan should contain fiber  Fiber is the part of grains, fruits, and vegetables that is not broken down by your body  Whole-grain foods are healthy and provide extra fiber in your diet  Some examples of whole-grain foods are whole-wheat breads and pastas, oatmeal, brown rice, and bulgur  · Eat a variety of vegetables every day  Include dark, leafy greens such as spinach, kale, shawn greens, and mustard greens  Eat yellow and orange vegetables such as carrots, sweet potatoes, and winter squash  · Eat a variety of fruits every day  Choose fresh or canned fruit (canned in its own juice or light syrup) instead of juice  Fruit juice has very little or no fiber  · Eat low-fat dairy foods  Drink fat-free (skim) milk or 1% milk  Eat fat-free yogurt and low-fat cottage cheese  Try low-fat cheeses such as mozzarella and other reduced-fat cheeses  · Choose meat and other protein foods that are low in fat  Choose beans or other legumes such as split peas or lentils  Choose fish, skinless poultry (chicken or turkey), or lean cuts of red meat (beef or pork)  Before you cook meat or poultry, cut off any visible fat  · Use less fat and oil  Try baking foods instead of frying them  Add less fat, such as margarine, sour cream, regular salad dressing and mayonnaise to foods   Eat fewer high-fat foods  Some examples of high-fat foods include french fries, doughnuts, ice cream, and cakes  · Eat fewer sweets  Limit foods and drinks that are high in sugar  This includes candy, cookies, regular soda, and sweetened drinks  Exercise:  Exercise at least 30 minutes per day on most days of the week  Some examples of exercise include walking, biking, dancing, and swimming  You can also fit in more physical activity by taking the stairs instead of the elevator or parking farther away from stores  Ask your healthcare provider about the best exercise plan for you  © Copyright [employer] 2018 Information is for End User's use only and may not be sold, redistributed or otherwise used for commercial purposes   All illustrations and images included in CareNotes® are the copyrighted property of [employer] D [employer] M , [employer]  or [address] [place] [address]

## 2019-12-31 NOTE — PROGRESS NOTES
Assessment and Plan:     Problem List Items Addressed This Visit     None      Visit Diagnoses     Medicare annual wellness visit, subsequent    -  Primary    Otitis externa of left ear, unspecified chronicity, unspecified type        will add ciprodex for dried bld in her left ear canal    Relevant Medications    ciprofloxacin-dexamethasone (CIPRODEX) otic suspension           Preventive health issues were discussed with patient, and age appropriate screening tests were ordered as noted in patient's After Visit Summary  Personalized health advice and appropriate referrals for health education or preventive services given if needed, as noted in patient's After Visit Summary       History of Present Illness:     Patient presents for Medicare Annual Wellness visit    Patient Care Team:  Brandon Parker DO as PCP - General     Problem List:     Patient Active Problem List   Diagnosis    Hypertension    Hypothyroidism    Overactive bladder    Palpitations    EKG, abnormal    Primary osteoarthritis of right knee    Chronic pain of right knee    Seasonal allergies      Past Medical and Surgical History:     Past Medical History:   Diagnosis Date    Seasonal allergies      Past Surgical History:   Procedure Laterality Date    ANKLE SURGERY      x2    APPENDECTOMY      CHOLECYSTECTOMY      laparoscopic    GALLBLADDER SURGERY      HYSTERECTOMY Bilateral     total abdominal with removal of both ovaries, age 39    INCISION TENDON SHEATH HAND      of a finger    NEUROPLASTY / TRANSPOSITION MEDIAN NERVE AT CARPAL TUNNEL      OOPHORECTOMY Bilateral     Age 39    TUBAL LIGATION        Family History:     Family History   Problem Relation Age of Onset    Heart disease Mother         endocarditis    Heart disease Father     Prostate cancer Father 80    No Known Problems Sister     No Known Problems Daughter     No Known Problems Maternal Grandmother     No Known Problems Maternal Grandfather     Kidney cancer Paternal Grandmother     No Known Problems Paternal Grandfather     No Known Problems Sister     No Known Problems Son     No Known Problems Son     No Known Problems Son     No Known Problems Maternal Aunt     No Known Problems Maternal Aunt     No Known Problems Paternal Aunt     Breast cancer Paternal Aunt 62    No Known Problems Paternal Aunt     No Known Problems Paternal Preetih Pac     Breast cancer Paternal Aunt 62      Social History:     Social History     Socioeconomic History    Marital status:       Spouse name: None    Number of children: None    Years of education: None    Highest education level: None   Occupational History    None   Social Needs    Financial resource strain: None    Food insecurity:     Worry: None     Inability: None    Transportation needs:     Medical: None     Non-medical: None   Tobacco Use    Smoking status: Never Smoker    Smokeless tobacco: Never Used   Substance and Sexual Activity    Alcohol use: Yes     Frequency: 2-3 times a week     Drinks per session: 1 or 2     Comment: wine with dinner; occasional use as per [employer]    Drug use: No    Sexual activity: None   Lifestyle    Physical activity:     Days per week: None     Minutes per session: None    Stress: None   Relationships    Social connections:     Talks on phone: None     Gets together: None     Attends Church service: None     Active member of club or organization: None     Attends meetings of clubs or organizations: None     Relationship status: None    Intimate partner violence:     Fear of current or ex partner: None     Emotionally abused: None     Physically abused: None     Forced sexual activity: None   Other Topics Concern    None   Social History Narrative    None       Medications and Allergies:     Current Outpatient Medications   Medication Sig Dispense Refill    Aspirin (ASPIR-81 PO) Aspir-81 81 MG Oral Tablet Delayed Release  TAKE 1 TABLET (81 MG TOTAL) BY MOUTH DAILY  Refills: 0       Katarina Pillar DO;  Started 27-May-2016  Active      Biotin 1 MG CAPS Biotin 1 MG Oral Capsule   Refills: 0       Katarina Pillar DO;  Started 27-May-2016  Active      Coral Calcium 1000 (390 Ca) MG TABS Coral Calcium 500 MG Oral Tablet   Refills: 0       Katarina Pillar DO;  Started 27-May-2016  Active      docusate sodium (COLACE) 100 mg capsule Take by mouth 3 (three) times a day      doxylamine (UNISON) 25 MG tablet Sleep Aid 25 MG Oral Tablet   Refills: 0       Katarina Pillar DO;  Started 27-May-2016  Active      Famotidine (PEPCID PO) Take 20 mg by mouth 2 (two) times a day        levothyroxine 125 mcg tablet Take 1 tablet (125 mcg total) by mouth daily 90 tablet 1    losartan (COZAAR) 25 mg tablet TAKE 1 TABLET DAILY 90 tablet 4    metoprolol succinate (TOPROL-XL) 50 mg 24 hr tablet Take 1 tablet (50 mg total) by mouth daily 90 tablet 1    montelukast (SINGULAIR) 10 mg tablet Take 1 tablet (10 mg total) by mouth daily 90 tablet 1    Multiple Vitamins-Minerals (MULTIVITAMIN ADULT PO) Take by mouth      Omega-3 1000 MG CAPS Take by mouth      oxybutynin (DITROPAN-XL) 10 MG 24 hr tablet Take 1 tablet (10 mg total) by mouth daily at bedtime 90 tablet 1    Psyllium (METAMUCIL FIBER PO) Take by mouth      Red Yeast Rice 600 MG CAPS Take by mouth      ciprofloxacin-dexamethasone (CIPRODEX) otic suspension Administer 4 drops into the left ear 2 (two) times a day 7 5 mL 0     No current facility-administered medications for this visit        Allergies   Allergen Reactions    Prednisone Tachycardia    Vicodin [Hydrocodone-Acetaminophen] Lightheadedness    Other Nasal Congestion     seasonal      Immunizations:     Immunization History   Administered Date(s) Administered    INFLUENZA 09/15/2016, 09/14/2018, 09/13/2019    Pneumococcal Conjugate 13-Valent 09/15/2016    Pneumococcal Polysaccharide PPV23 10/01/2014    Tdap 09/15/2016    Zoster 07/31/2017      Health Maintenance:         Topic Date Due    DXA SCAN  06/07/2021     There are no preventive care reminders to display for this patient  Medicare Health Risk Assessment:     /72 (BP Location: Left arm, Patient Position: Sitting, Cuff Size: Large)   Pulse 70   Temp 97 8 °F (36 6 °C) (Temporal)   Resp 16   Ht 5' 4 5" (1 638 m)   Wt 101 kg (223 lb)   SpO2 97%   BMI 37 69 kg/m²      Antony Sierra is here for her Subsequent Wellness visit  Health Risk Assessment:   Patient rates overall health as very good  Patient feels that their physical health rating is same  Eyesight was rated as same  Hearing was rated as same  Patient feels that their emotional and mental health rating is same  Pain experienced in the last 7 days has been none  Patient states that she has experienced no weight loss or gain in last 6 months  Depression Screening:   PHQ-2 Score: 0      Fall Risk Screening: In the past year, patient has experienced: no history of falling in past year      Urinary Incontinence Screening:   Patient has leaked urine accidently in the last six months  Home Safety:  Patient does not have trouble with stairs inside or outside of their home  Patient has working smoke alarms and has working carbon monoxide detector  Home safety hazards include: none  Nutrition:   Current diet is Regular  Medications:   Patient is currently taking over-the-counter supplements  OTC medications include: see medication list  Patient is able to manage medications  Activities of Daily Living (ADLs)/Instrumental Activities of Daily Living (IADLs):   Walk and transfer into and out of bed and chair?: Yes  Dress and groom yourself?: Yes    Bathe or shower yourself?: Yes    Feed yourself?  Yes  Do your laundry/housekeeping?: Yes  Manage your money, pay your bills and track your expenses?: Yes  Make your own meals?: Yes    Do your own shopping?: Yes    Previous Hospitalizations:   Any hospitalizations or ED visits within the last 12 months?: No      Advance Care Planning:   Living will: Yes    Durable POA for healthcare:  Yes    Advanced directive: Yes      Comments: Durable poa - daughter flori and son nick    Cognitive Screening:   Provider or family/friend/caregiver concerned regarding cognition?: No    PREVENTIVE SCREENINGS      Cardiovascular Screening:    General: Screening Current      Diabetes Screening:     General: Screening Current      Breast Cancer Screening:     General: Screening Current      Cervical Cancer Screening:    General: Screening Not Indicated      Osteoporosis Screening:    General: Screening Current      Alberto Dia DO

## 2020-01-04 NOTE — PROGRESS NOTES
Assessment/Plan:         Diagnoses and all orders for this visit:    Medicare annual wellness visit, subsequent    Otitis externa of left ear, unspecified chronicity, unspecified type  Comments:  will add ciprodex for dried bld in her left ear canal  Orders:  -     ciprofloxacin-dexamethasone (CIPRODEX) otic suspension; Administer 4 drops into the left ear 2 (two) times a day          Subjective:      Patient ID: Rojelio Cano is a 70 y o  female  She asked me to check her left ear   +discomfort      The following portions of the patient's history were reviewed and updated as appropriate: She  has a past medical history of Seasonal allergies  She   Patient Active Problem List    Diagnosis Date Noted    Seasonal allergies 05/08/2019    Primary osteoarthritis of right knee 06/08/2018    Chronic pain of right knee 06/08/2018    Hypertension 01/24/2018    Hypothyroidism 01/24/2018    Overactive bladder 01/24/2018    Palpitations 01/24/2018    EKG, abnormal 01/24/2018     She  has a past surgical history that includes Gallbladder surgery; Appendectomy; Ankle surgery; Cholecystectomy; Incision tendon sheath hand; Neuroplasty / transposition median nerve at carpal tunnel; Tubal ligation; Oophorectomy (Bilateral); and Hysterectomy (Bilateral)  Her family history includes Breast cancer (age of onset: 64) in her paternal aunt; Breast cancer (age of onset: 62) in her paternal aunt; Heart disease in her father and mother; Kidney cancer in her paternal grandmother; No Known Problems in her daughter, maternal aunt, maternal aunt, maternal grandfather, maternal grandmother, paternal aunt, paternal aunt, paternal aunt, paternal grandfather, sister, sister, son, son, and son; Prostate cancer (age of onset: 80) in her father  She  reports that she has never smoked  She has never used smokeless tobacco  She reports that she drinks alcohol  She reports that she does not use drugs    Current Outpatient Medications Medication Sig Dispense Refill    Aspirin (ASPIR-81 PO) Aspir-81 81 MG Oral Tablet Delayed Release  TAKE 1 TABLET (81 MG TOTAL) BY MOUTH DAILY  Refills: 0       Alyssia Severin DO;  Started 27-May-2016  Active      Biotin 1 MG CAPS Biotin 1 MG Oral Capsule   Refills: 0       Alyssia Severin DO;  Started 27-May-2016  Active      Coral Calcium 1000 (390 Ca) MG TABS Coral Calcium 500 MG Oral Tablet   Refills: 0       Heber Severin DO;  Started 27-May-2016  Active      docusate sodium (COLACE) 100 mg capsule Take by mouth 3 (three) times a day      doxylamine (UNISON) 25 MG tablet Sleep Aid 25 MG Oral Tablet   Refills: 0       Heber Severin DO;  Started 27-May-2016  Active      Famotidine (PEPCID PO) Take 20 mg by mouth 2 (two) times a day        levothyroxine 125 mcg tablet Take 1 tablet (125 mcg total) by mouth daily 90 tablet 1    losartan (COZAAR) 25 mg tablet TAKE 1 TABLET DAILY 90 tablet 4    metoprolol succinate (TOPROL-XL) 50 mg 24 hr tablet Take 1 tablet (50 mg total) by mouth daily 90 tablet 1    montelukast (SINGULAIR) 10 mg tablet Take 1 tablet (10 mg total) by mouth daily 90 tablet 1    Multiple Vitamins-Minerals (MULTIVITAMIN ADULT PO) Take by mouth      Omega-3 1000 MG CAPS Take by mouth      oxybutynin (DITROPAN-XL) 10 MG 24 hr tablet Take 1 tablet (10 mg total) by mouth daily at bedtime 90 tablet 1    Psyllium (METAMUCIL FIBER PO) Take by mouth      Red Yeast Rice 600 MG CAPS Take by mouth      ciprofloxacin-dexamethasone (CIPRODEX) otic suspension Administer 4 drops into the left ear 2 (two) times a day 7 5 mL 0     No current facility-administered medications for this visit  Current Outpatient Medications on File Prior to Visit   Medication Sig    Aspirin (ASPIR-81 PO) Aspir-81 81 MG Oral Tablet Delayed Release  TAKE 1 TABLET (81 MG TOTAL) BY MOUTH DAILY     Refills: 0       Alyssia Severin DO;  Started 27-May-2016  Active    Biotin 1 MG CAPS Biotin 1 MG Oral Capsule Refills: 0       Mariah Osier DO;  Started 27-May-2016  Active    Coral Calcium 1000 (390 Ca) MG TABS Coral Calcium 500 MG Oral Tablet   Refills: 0       Mariah Osier DO;  Started 27-May-2016  Active    docusate sodium (COLACE) 100 mg capsule Take by mouth 3 (three) times a day    doxylamine (UNISON) 25 MG tablet Sleep Aid 25 MG Oral Tablet   Refills: 0       Mariah Osier DO;  Started 27-May-2016  Active    Famotidine (PEPCID PO) Take 20 mg by mouth 2 (two) times a day      levothyroxine 125 mcg tablet Take 1 tablet (125 mcg total) by mouth daily    losartan (COZAAR) 25 mg tablet TAKE 1 TABLET DAILY    metoprolol succinate (TOPROL-XL) 50 mg 24 hr tablet Take 1 tablet (50 mg total) by mouth daily    montelukast (SINGULAIR) 10 mg tablet Take 1 tablet (10 mg total) by mouth daily    Multiple Vitamins-Minerals (MULTIVITAMIN ADULT PO) Take by mouth    Omega-3 1000 MG CAPS Take by mouth    oxybutynin (DITROPAN-XL) 10 MG 24 hr tablet Take 1 tablet (10 mg total) by mouth daily at bedtime    Psyllium (METAMUCIL FIBER PO) Take by mouth    Red Yeast Rice 600 MG CAPS Take by mouth     No current facility-administered medications on file prior to visit  She is allergic to prednisone; vicodin [hydrocodone-acetaminophen]; and other       Review of Systems   Constitutional: Negative for chills and fever  HENT: Positive for ear pain  Negative for congestion, sinus pressure, sinus pain and sore throat  Respiratory: Negative  Cardiovascular: Negative  Objective:      /72 (BP Location: Left arm, Patient Position: Sitting, Cuff Size: Large)   Pulse 70   Temp 97 8 °F (36 6 °C) (Temporal)   Resp 16   Ht 5' 4 5" (1 638 m)   Wt 101 kg (223 lb)   SpO2 97%   BMI 37 69 kg/m²          Physical Exam   Constitutional: She appears well-developed and well-nourished  No distress  HENT:   Head: Normocephalic     Right Ear: External ear normal    +dried bld in left ear canal   Neck: Normal range of motion  Neck supple  No thyromegaly present  Cardiovascular: Normal rate, regular rhythm, normal heart sounds and intact distal pulses  Exam reveals no gallop  No murmur heard  Pulmonary/Chest: Effort normal and breath sounds normal  No respiratory distress  She has no wheezes  She has no rales  Lymphadenopathy:     She has no cervical adenopathy  Skin: She is not diaphoretic

## 2020-01-10 ENCOUNTER — OFFICE VISIT (OUTPATIENT)
Dept: OBGYN CLINIC | Facility: MEDICAL CENTER | Age: 80
End: 2020-01-10
Payer: COMMERCIAL

## 2020-01-10 VITALS
BODY MASS INDEX: 37.49 KG/M2 | HEART RATE: 73 BPM | SYSTOLIC BLOOD PRESSURE: 138 MMHG | WEIGHT: 225 LBS | HEIGHT: 65 IN | DIASTOLIC BLOOD PRESSURE: 77 MMHG

## 2020-01-10 DIAGNOSIS — M17.11 PRIMARY OSTEOARTHRITIS OF RIGHT KNEE: Primary | ICD-10-CM

## 2020-01-10 DIAGNOSIS — M25.561 CHRONIC PAIN OF RIGHT KNEE: ICD-10-CM

## 2020-01-10 DIAGNOSIS — G89.29 CHRONIC PAIN OF RIGHT KNEE: ICD-10-CM

## 2020-01-10 PROCEDURE — 99212 OFFICE O/P EST SF 10 MIN: CPT | Performed by: ORTHOPAEDIC SURGERY

## 2020-01-10 NOTE — PROGRESS NOTES
Assessment:  1  Primary osteoarthritis of right knee     2  Chronic pain of right knee         Plan:  The patient is doing well following Euflexxa injections 3 months ago  Repeat right knee Euflexxa injections were re-ordered  The patient should follow up in 3 months  To do next visit:  Return in about 3 months (around 4/10/2020) for for right knee Euflexxa x3  The above stated was discussed in layman's terms and the patient expressed understanding  All questions were answered to the patient's satisfaction  Scribe Attestation    I,:   Carmelo Hudson am acting as a scribe while in the presence of the attending physician :        I,:   Marcus Garcia MD personally performed the services described in this documentation    as scribed in my presence :              Subjective:   Verónica Ibarra is a 70 y o  female who presents for right knee  She is 3 months s/p right nee Euflexxa injections with significant long lasting relief  Today she has no pain complaints  Kneeling aggravates  She denies medications for this issue          Review of systems negative unless otherwise specified in HPI    Past Medical History:   Diagnosis Date    Seasonal allergies        Past Surgical History:   Procedure Laterality Date    ANKLE SURGERY      x2    APPENDECTOMY      CHOLECYSTECTOMY      laparoscopic    GALLBLADDER SURGERY      HYSTERECTOMY Bilateral     total abdominal with removal of both ovaries, age 39    [address] HAND      of a finger    NEUROPLASTY / TRANSPOSITION MEDIAN NERVE AT CARPAL TUNNEL      OOPHORECTOMY Bilateral     Age 39    TUBAL LIGATION         Family History   Problem Relation Age of Onset    Heart disease Mother         endocarditis    Heart disease Father     Prostate cancer Father 80    No Known Problems Sister     No Known Problems Daughter     No Known Problems Maternal Grandmother     No Known Problems Maternal Grandfather     Kidney cancer Paternal Grandmother     No Known Problems Paternal Grandfather     No Known Problems Sister     No Known Problems Son     No Known Problems Son     No Known Problems Son     No Known Problems Maternal Aunt     No Known Problems Maternal Aunt     No Known Problems Paternal Aunt     Breast cancer Paternal Aunt 62    No Known Problems Paternal Aunt     No Known Problems Paternal [de-identified]     Breast cancer Paternal Aunt 64       Social History     Occupational History    Not on file   Tobacco Use    Smoking status: Never Smoker    Smokeless tobacco: Never Used   Substance and Sexual Activity    Alcohol use: Yes     Frequency: 2-3 times a week     Drinks per session: 1 or 2     Comment: wine with dinner; occasional use as per [employer]    Drug use: No    Sexual activity: Not on file         Current Outpatient Medications:     Aspirin (ASPIR-81 PO), Aspir-81 81 MG Oral Tablet Delayed Release TAKE 1 TABLET (81 MG TOTAL) BY MOUTH DAILY    Refills: 0    Radha Bourdon DO;  Started 27-May-2016 Active, Disp: , Rfl:     Biotin 1 MG CAPS, Biotin 1 MG Oral Capsule  Refills: 0    Radha Bourdon DO;  Started 27-May-2016 Active, Disp: , Rfl:     ciprofloxacin-dexamethasone (CIPRODEX) otic suspension, Administer 4 drops into the left ear 2 (two) times a day, Disp: 7 5 mL, Rfl: 0    Coral Calcium 1000 (390 Ca) MG TABS, Coral Calcium 500 MG Oral Tablet  Refills: 0    Radha Bourdon DO;  Started 27-May-2016 Active, Disp: , Rfl:     docusate sodium (COLACE) 100 mg capsule, Take by mouth 3 (three) times a day, Disp: , Rfl:     doxylamine (UNISON) 25 MG tablet, Sleep Aid 25 MG Oral Tablet  Refills: 0    Radha Bourdon DO;  Started 27-May-2016 Active, Disp: , Rfl:     Famotidine (PEPCID PO), Take 20 mg by mouth 2 (two) times a day  , Disp: , Rfl:     levothyroxine 125 mcg tablet, Take 1 tablet (125 mcg total) by mouth daily, Disp: 90 tablet, Rfl: 1    losartan (COZAAR) 25 mg tablet, TAKE 1 TABLET DAILY, Disp: 90 tablet, Rfl: 4    metoprolol succinate (TOPROL-XL) 50 mg 24 hr tablet, Take 1 tablet (50 mg total) by mouth daily, Disp: 90 tablet, Rfl: 1    montelukast (SINGULAIR) 10 mg tablet, Take 1 tablet (10 mg total) by mouth daily, Disp: 90 tablet, Rfl: 1    Multiple Vitamins-Minerals (MULTIVITAMIN ADULT PO), Take by mouth, Disp: , Rfl:     Omega-3 1000 MG CAPS, Take by mouth, Disp: , Rfl:     oxybutynin (DITROPAN-XL) 10 MG 24 hr tablet, Take 1 tablet (10 mg total) by mouth daily at bedtime, Disp: 90 tablet, Rfl: 1    Psyllium (METAMUCIL FIBER PO), Take by mouth, Disp: , Rfl:     Red Yeast Rice 600 MG CAPS, Take by mouth, Disp: , Rfl:     Allergies   Allergen Reactions    Prednisone Tachycardia    Vicodin [Hydrocodone-Acetaminophen] Lightheadedness    Other Nasal Congestion     seasonal            Vitals:    01/10/20 1358   BP: 138/77   Pulse: 73       Objective:  Physical exam  · General: Awake, Alert, Oriented  · Eyes: Pupils equal, round and reactive to light  · Heart: regular rate and rhythm  · Lungs: No audible wheezing  · Abdomen: soft                    Ortho Exam   Right knee:  No erythema or ecchymosis  No effusion or swelling  Normal strength  Good ROM with crepitus   Calf compartments soft and supple  Sensation intact  Toes are warm sensate and mobile        Diagnostics, reviewed and taken today if performed as documented:    None performed    Procedures, if performed today:    Procedures    None performed      Portions of the record may have been created with voice recognition software  Occasional wrong word or "sound a like" substitutions may have occurred due to the inherent limitations of voice recognition software  Read the chart carefully and recognize, using context, where substitutions have occurred

## 2020-03-02 DIAGNOSIS — T78.40XD ALLERGIC DISORDER, SUBSEQUENT ENCOUNTER: ICD-10-CM

## 2020-03-04 RX ORDER — MONTELUKAST SODIUM 10 MG/1
10 TABLET ORAL DAILY
Qty: 90 TABLET | Refills: 1 | Status: SHIPPED | OUTPATIENT
Start: 2020-03-04 | End: 2020-08-17

## 2020-05-11 ENCOUNTER — TELEPHONE (OUTPATIENT)
Dept: OTHER | Facility: OTHER | Age: 80
End: 2020-05-11

## 2020-05-12 ENCOUNTER — TELEPHONE (OUTPATIENT)
Dept: OBGYN CLINIC | Facility: HOSPITAL | Age: 80
End: 2020-05-12

## 2020-05-12 ENCOUNTER — TELEMEDICINE (OUTPATIENT)
Dept: FAMILY MEDICINE CLINIC | Facility: CLINIC | Age: 80
End: 2020-05-12
Payer: COMMERCIAL

## 2020-05-12 DIAGNOSIS — E78.5 DYSLIPIDEMIA: ICD-10-CM

## 2020-05-12 DIAGNOSIS — E03.9 HYPOTHYROIDISM, UNSPECIFIED TYPE: ICD-10-CM

## 2020-05-12 DIAGNOSIS — I10 HYPERTENSION, UNSPECIFIED TYPE: Primary | ICD-10-CM

## 2020-05-12 PROCEDURE — 1160F RVW MEDS BY RX/DR IN RCRD: CPT | Performed by: INTERNAL MEDICINE

## 2020-05-12 PROCEDURE — 99213 OFFICE O/P EST LOW 20 MIN: CPT | Performed by: INTERNAL MEDICINE

## 2020-05-15 ENCOUNTER — OFFICE VISIT (OUTPATIENT)
Dept: OBGYN CLINIC | Facility: CLINIC | Age: 80
End: 2020-05-15
Payer: COMMERCIAL

## 2020-05-15 VITALS
WEIGHT: 225 LBS | HEIGHT: 65 IN | HEART RATE: 73 BPM | SYSTOLIC BLOOD PRESSURE: 142 MMHG | BODY MASS INDEX: 37.49 KG/M2 | DIASTOLIC BLOOD PRESSURE: 83 MMHG

## 2020-05-15 DIAGNOSIS — M18.11 PRIMARY OSTEOARTHRITIS OF FIRST CARPOMETACARPAL JOINT OF RIGHT HAND: Primary | ICD-10-CM

## 2020-05-15 PROCEDURE — 1036F TOBACCO NON-USER: CPT | Performed by: ORTHOPAEDIC SURGERY

## 2020-05-15 PROCEDURE — 20600 DRAIN/INJ JOINT/BURSA W/O US: CPT | Performed by: ORTHOPAEDIC SURGERY

## 2020-05-15 PROCEDURE — 3077F SYST BP >= 140 MM HG: CPT | Performed by: ORTHOPAEDIC SURGERY

## 2020-05-15 PROCEDURE — 4040F PNEUMOC VAC/ADMIN/RCVD: CPT | Performed by: ORTHOPAEDIC SURGERY

## 2020-05-15 PROCEDURE — 99213 OFFICE O/P EST LOW 20 MIN: CPT | Performed by: ORTHOPAEDIC SURGERY

## 2020-05-15 PROCEDURE — 1160F RVW MEDS BY RX/DR IN RCRD: CPT | Performed by: ORTHOPAEDIC SURGERY

## 2020-05-15 PROCEDURE — 3079F DIAST BP 80-89 MM HG: CPT | Performed by: ORTHOPAEDIC SURGERY

## 2020-05-15 RX ADMIN — Medication 0.05 MEQ: at 11:23

## 2020-05-15 RX ADMIN — TRIAMCINOLONE ACETONIDE 20 MG: 40 INJECTION, SUSPENSION INTRA-ARTICULAR; INTRAMUSCULAR at 11:23

## 2020-05-15 RX ADMIN — LIDOCAINE HYDROCHLORIDE 2.5 ML: 10 INJECTION, SOLUTION INFILTRATION; PERINEURAL at 11:23

## 2020-05-19 RX ORDER — TRIAMCINOLONE ACETONIDE 40 MG/ML
20 INJECTION, SUSPENSION INTRA-ARTICULAR; INTRAMUSCULAR
Status: COMPLETED | OUTPATIENT
Start: 2020-05-15 | End: 2020-05-15

## 2020-05-19 RX ORDER — LIDOCAINE HYDROCHLORIDE 10 MG/ML
2.5 INJECTION, SOLUTION INFILTRATION; PERINEURAL
Status: COMPLETED | OUTPATIENT
Start: 2020-05-15 | End: 2020-05-15

## 2020-06-12 DIAGNOSIS — N32.81 OVERACTIVE BLADDER: ICD-10-CM

## 2020-06-12 DIAGNOSIS — I10 ESSENTIAL HYPERTENSION: ICD-10-CM

## 2020-06-12 RX ORDER — OXYBUTYNIN CHLORIDE 10 MG/1
10 TABLET, EXTENDED RELEASE ORAL
Qty: 90 TABLET | Refills: 1 | Status: SHIPPED | OUTPATIENT
Start: 2020-06-12 | End: 2021-01-04

## 2020-06-12 RX ORDER — METOPROLOL SUCCINATE 50 MG/1
TABLET, EXTENDED RELEASE ORAL
Qty: 90 TABLET | Refills: 3 | Status: SHIPPED | OUTPATIENT
Start: 2020-06-12 | End: 2021-06-11

## 2020-07-22 ENCOUNTER — APPOINTMENT (OUTPATIENT)
Dept: LAB | Facility: MEDICAL CENTER | Age: 80
End: 2020-07-22
Payer: COMMERCIAL

## 2020-07-22 DIAGNOSIS — E78.5 DYSLIPIDEMIA: ICD-10-CM

## 2020-07-22 DIAGNOSIS — E03.9 HYPOTHYROIDISM, UNSPECIFIED TYPE: ICD-10-CM

## 2020-07-22 LAB
ALBUMIN SERPL BCP-MCNC: 3.6 G/DL (ref 3.5–5)
ALP SERPL-CCNC: 68 U/L (ref 46–116)
ALT SERPL W P-5'-P-CCNC: 32 U/L (ref 12–78)
ANION GAP SERPL CALCULATED.3IONS-SCNC: 5 MMOL/L (ref 4–13)
AST SERPL W P-5'-P-CCNC: 28 U/L (ref 5–45)
BILIRUB SERPL-MCNC: 0.63 MG/DL (ref 0.2–1)
BUN SERPL-MCNC: 14 MG/DL (ref 5–25)
CALCIUM SERPL-MCNC: 9.9 MG/DL (ref 8.3–10.1)
CHLORIDE SERPL-SCNC: 109 MMOL/L (ref 100–108)
CHOLEST SERPL-MCNC: 199 MG/DL (ref 50–200)
CO2 SERPL-SCNC: 27 MMOL/L (ref 21–32)
CREAT SERPL-MCNC: 0.9 MG/DL (ref 0.6–1.3)
GFR SERPL CREATININE-BSD FRML MDRD: 61 ML/MIN/1.73SQ M
GLUCOSE P FAST SERPL-MCNC: 98 MG/DL (ref 65–99)
HDLC SERPL-MCNC: 60 MG/DL
LDLC SERPL CALC-MCNC: 104 MG/DL (ref 0–100)
NONHDLC SERPL-MCNC: 139 MG/DL
POTASSIUM SERPL-SCNC: 4.4 MMOL/L (ref 3.5–5.3)
PROT SERPL-MCNC: 6.9 G/DL (ref 6.4–8.2)
SODIUM SERPL-SCNC: 141 MMOL/L (ref 136–145)
TRIGL SERPL-MCNC: 175 MG/DL
TSH SERPL DL<=0.05 MIU/L-ACNC: 1.5 UIU/ML (ref 0.36–3.74)

## 2020-07-22 PROCEDURE — 36415 COLL VENOUS BLD VENIPUNCTURE: CPT

## 2020-07-22 PROCEDURE — 80061 LIPID PANEL: CPT

## 2020-07-22 PROCEDURE — 84443 ASSAY THYROID STIM HORMONE: CPT

## 2020-07-22 PROCEDURE — 80053 COMPREHEN METABOLIC PANEL: CPT

## 2020-07-28 ENCOUNTER — HOSPITAL ENCOUNTER (OUTPATIENT)
Dept: RADIOLOGY | Age: 80
Discharge: HOME | End: 2020-07-28
Payer: COMMERCIAL

## 2020-07-28 VITALS — WEIGHT: 215 LBS | HEIGHT: 65 IN | BODY MASS INDEX: 35.82 KG/M2

## 2020-07-28 DIAGNOSIS — Z12.31 ENCOUNTER FOR SCREENING MAMMOGRAM FOR MALIGNANT NEOPLASM OF BREAST: ICD-10-CM

## 2020-07-28 PROCEDURE — 77067 SCR MAMMO BI INCL CAD: CPT

## 2020-07-28 PROCEDURE — 77063 BREAST TOMOSYNTHESIS BI: CPT

## 2020-07-29 ENCOUNTER — TELEPHONE (OUTPATIENT)
Dept: FAMILY MEDICINE CLINIC | Facility: CLINIC | Age: 80
End: 2020-07-29

## 2020-07-29 DIAGNOSIS — E03.9 HYPOTHYROIDISM, UNSPECIFIED TYPE: ICD-10-CM

## 2020-07-29 RX ORDER — LEVOTHYROXINE SODIUM 0.12 MG/1
125 TABLET ORAL DAILY
Qty: 90 TABLET | Refills: 1 | Status: SHIPPED | OUTPATIENT
Start: 2020-07-29 | End: 2021-01-04

## 2020-07-29 NOTE — TELEPHONE ENCOUNTER
Looking to review results of labs from 1 week ago  Needs to get refills on meds and would like to discuss and confirm no changes will be made  Please call her

## 2020-08-04 ENCOUNTER — TELEMEDICINE (OUTPATIENT)
Dept: FAMILY MEDICINE CLINIC | Facility: CLINIC | Age: 80
End: 2020-08-04
Payer: COMMERCIAL

## 2020-08-04 DIAGNOSIS — J06.9 UPPER RESPIRATORY TRACT INFECTION, UNSPECIFIED TYPE: Primary | ICD-10-CM

## 2020-08-04 PROCEDURE — 1036F TOBACCO NON-USER: CPT | Performed by: INTERNAL MEDICINE

## 2020-08-04 PROCEDURE — 4040F PNEUMOC VAC/ADMIN/RCVD: CPT | Performed by: INTERNAL MEDICINE

## 2020-08-04 PROCEDURE — 99213 OFFICE O/P EST LOW 20 MIN: CPT | Performed by: INTERNAL MEDICINE

## 2020-08-04 PROCEDURE — 3077F SYST BP >= 140 MM HG: CPT | Performed by: INTERNAL MEDICINE

## 2020-08-04 PROCEDURE — 3079F DIAST BP 80-89 MM HG: CPT | Performed by: INTERNAL MEDICINE

## 2020-08-04 PROCEDURE — 1160F RVW MEDS BY RX/DR IN RCRD: CPT | Performed by: INTERNAL MEDICINE

## 2020-08-04 RX ORDER — AMOXICILLIN 500 MG/1
500 CAPSULE ORAL EVERY 8 HOURS SCHEDULED
Qty: 30 CAPSULE | Refills: 0 | Status: SHIPPED | OUTPATIENT
Start: 2020-08-04 | End: 2020-08-14

## 2020-08-04 NOTE — PROGRESS NOTES
Assessment/Plan:         Diagnoses and all orders for this visit:    Upper respiratory tract infection, unspecified type  Comments:  amox  Orders:  -     amoxicillin (AMOXIL) 500 mg capsule; Take 1 capsule (500 mg total) by mouth every 8 (eight) hours for 10 days          Subjective:      Patient ID: Patsy Sharif is a 68 y o  female  Pt complains of cough x 1 week   -f/s/c -sore throat -gi s/s +post nasal drip      The following portions of the patient's history were reviewed and updated as appropriate: She  has a past medical history of Seasonal allergies  She   Patient Active Problem List    Diagnosis Date Noted    Seasonal allergies 05/08/2019    Primary osteoarthritis of right knee 06/08/2018    Chronic pain of right knee 06/08/2018    Hypertension 01/24/2018    Hypothyroidism 01/24/2018    Overactive bladder 01/24/2018    Palpitations 01/24/2018    EKG, abnormal 01/24/2018     She  has a past surgical history that includes Gallbladder surgery; Appendectomy; Ankle surgery; Cholecystectomy; Incision tendon sheath hand; Neuroplasty / transposition median nerve at carpal tunnel; Tubal ligation; Oophorectomy (Bilateral); and Hysterectomy (Bilateral)  Her family history includes Breast cancer (age of onset: 64) in her paternal aunt; Breast cancer (age of onset: 62) in her paternal aunt; Heart disease in her father and mother; Kidney cancer in her paternal grandmother; No Known Problems in her daughter, maternal aunt, maternal aunt, maternal grandfather, maternal grandmother, paternal aunt, paternal aunt, paternal aunt, paternal grandfather, sister, sister, son, son, and son; Prostate cancer (age of onset: 80) in her father  She  reports that she has never smoked  She has never used smokeless tobacco  She reports current alcohol use  She reports that she does not use drugs    Current Outpatient Medications   Medication Sig Dispense Refill    amoxicillin (AMOXIL) 500 mg capsule Take 1 capsule (500 mg total) by mouth every 8 (eight) hours for 10 days 30 capsule 0    Aspirin (ASPIR-81 PO) Aspir-81 81 MG Oral Tablet Delayed Release  TAKE 1 TABLET (81 MG TOTAL) BY MOUTH DAILY  Refills: 0       Merary Yarbrough DO;  Started 27-May-2016  Active      Biotin 1 MG CAPS Biotin 1 MG Oral Capsule   Refills: 0       Merary Yarbrough DO;  Started 27-May-2016  Active      Coral Calcium 1000 (390 Ca) MG TABS Coral Calcium 500 MG Oral Tablet   Refills: 0       Merary Yarbrough DO;  Started 27-May-2016  Active      docusate sodium (COLACE) 100 mg capsule Take by mouth 3 (three) times a day      doxylamine (UNISON) 25 MG tablet Sleep Aid 25 MG Oral Tablet   Refills: 0       Merary Yarbrough DO;  Started 27-May-2016  Active      Famotidine (PEPCID PO) Take 20 mg by mouth 2 (two) times a day        levothyroxine 125 mcg tablet Take 1 tablet (125 mcg total) by mouth daily 90 tablet 1    losartan (COZAAR) 25 mg tablet TAKE 1 TABLET DAILY 90 tablet 4    metoprolol succinate (TOPROL-XL) 50 mg 24 hr tablet TAKE 1 TABLET DAILY 90 tablet 3    montelukast (SINGULAIR) 10 mg tablet Take 1 tablet (10 mg total) by mouth daily 90 tablet 1    Multiple Vitamins-Minerals (MULTIVITAMIN ADULT PO) Take by mouth      Omega-3 1000 MG CAPS Take by mouth      oxybutynin (DITROPAN-XL) 10 MG 24 hr tablet Take 1 tablet (10 mg total) by mouth daily at bedtime 90 tablet 1    Psyllium (METAMUCIL FIBER PO) Take by mouth      Red Yeast Rice 600 MG CAPS Take by mouth       No current facility-administered medications for this visit  Current Outpatient Medications on File Prior to Visit   Medication Sig    Aspirin (ASPIR-81 PO) Aspir-81 81 MG Oral Tablet Delayed Release  TAKE 1 TABLET (81 MG TOTAL) BY MOUTH DAILY     Refills: 0       Merary Yarbrough DO;  Started 27-May-2016  Active    Biotin 1 MG CAPS Biotin 1 MG Oral Capsule   Refills: 0       Merary Yarbrough DO;  Started 27-May-2016  Active    Coral Calcium 1000 (390 Ca) MG TABS Coral Calcium 500 MG Oral Tablet   Refills: 0       Andrew Settles DO;  Started 27-May-2016  Active    docusate sodium (COLACE) 100 mg capsule Take by mouth 3 (three) times a day    doxylamine (UNISON) 25 MG tablet Sleep Aid 25 MG Oral Tablet   Refills: 0       Naples Settles DO;  Started 27-May-2016  Active    Famotidine (PEPCID PO) Take 20 mg by mouth 2 (two) times a day      levothyroxine 125 mcg tablet Take 1 tablet (125 mcg total) by mouth daily    losartan (COZAAR) 25 mg tablet TAKE 1 TABLET DAILY    metoprolol succinate (TOPROL-XL) 50 mg 24 hr tablet TAKE 1 TABLET DAILY    montelukast (SINGULAIR) 10 mg tablet Take 1 tablet (10 mg total) by mouth daily    Multiple Vitamins-Minerals (MULTIVITAMIN ADULT PO) Take by mouth    Omega-3 1000 MG CAPS Take by mouth    oxybutynin (DITROPAN-XL) 10 MG 24 hr tablet Take 1 tablet (10 mg total) by mouth daily at bedtime    Psyllium (METAMUCIL FIBER PO) Take by mouth    Red Yeast Rice 600 MG CAPS Take by mouth     No current facility-administered medications on file prior to visit  She is allergic to prednisone; vicodin [hydrocodone-acetaminophen]; and other       Review of Systems   Constitutional: Negative for chills and fever  HENT: Positive for postnasal drip  Negative for sore throat  Respiratory: Positive for cough  Objective: There were no vitals taken for this visit  Physical Exam   Constitutional: No distress  HENT:   Head: Normocephalic and atraumatic  Pulmonary/Chest: No respiratory distress  Abdominal: Normal appearance  Neurological: She is alert

## 2020-08-12 NOTE — PROGRESS NOTES
Virtual Regular Visit      Assessment/Plan:    Problem List Items Addressed This Visit     None      Visit Diagnoses     Upper respiratory tract infection, unspecified type    -  Primary    amox    Relevant Medications    amoxicillin (AMOXIL) 500 mg capsule               Reason for visit is see hpi     Encounter provider Kamryn Altamirano DO    Provider located at [address] [place] [id number] [phone]      Recent Visits  No visits were found meeting these conditions  Showing recent visits within past 7 days and meeting all other requirements     Future Appointments  No visits were found meeting these conditions  Showing future appointments within next 150 days and meeting all other requirements        The patient was identified by name and date of birth  Vinnie Quinteros was informed that this is a telemedicine visit and that the visit is being conducted through [address] and patient was informed that this is not a secure, HIPAA-complaint platform  She agrees to proceed     My office door was closed  No one else was in the room  She acknowledged consent and understanding of privacy and security of the video platform  The patient has agreed to participate and understands they can discontinue the visit at any time  Patient is aware this is a billable service  Subjective  Vinnie Quinteros is a 68 y o  female see hpi   Pt complains of cpugh x 1 week - it is dry    Denies f/s/c denies n/v/d/c   +pnd +sore throat       Past Medical History:   Diagnosis Date    Seasonal allergies        Past Surgical History:   Procedure Laterality Date    ANKLE SURGERY      x2    APPENDECTOMY      CHOLECYSTECTOMY      laparoscopic    GALLBLADDER SURGERY      HYSTERECTOMY Bilateral     total abdominal with removal of both ovaries, age 39    [address] HAND      of a finger    NEUROPLASTY / TRANSPOSITION MEDIAN NERVE AT CARPAL TUNNEL      OOPHORECTOMY Bilateral     Age 39    TUBAL LIGATION         Current Outpatient Medications   Medication Sig Dispense Refill    amoxicillin (AMOXIL) 500 mg capsule Take 1 capsule (500 mg total) by mouth every 8 (eight) hours for 10 days 30 capsule 0    Aspirin (ASPIR-81 PO) Aspir-81 81 MG Oral Tablet Delayed Release  TAKE 1 TABLET (81 MG TOTAL) BY MOUTH DAILY  Refills: 0       Micki Mount DO;  Started 27-May-2016  Active      Biotin 1 MG CAPS Biotin 1 MG Oral Capsule   Refills: 0       Micki Mount DO;  Started 27-May-2016  Active      Coral Calcium 1000 (390 Ca) MG TABS Coral Calcium 500 MG Oral Tablet   Refills: 0       Micki Mount DO;  Started 27-May-2016  Active      docusate sodium (COLACE) 100 mg capsule Take by mouth 3 (three) times a day      doxylamine (UNISON) 25 MG tablet Sleep Aid 25 MG Oral Tablet   Refills: 0       Micki Mount DO;  Started 27-May-2016  Active      Famotidine (PEPCID PO) Take 20 mg by mouth 2 (two) times a day        levothyroxine 125 mcg tablet Take 1 tablet (125 mcg total) by mouth daily 90 tablet 1    losartan (COZAAR) 25 mg tablet TAKE 1 TABLET DAILY 90 tablet 4    metoprolol succinate (TOPROL-XL) 50 mg 24 hr tablet TAKE 1 TABLET DAILY 90 tablet 3    montelukast (SINGULAIR) 10 mg tablet Take 1 tablet (10 mg total) by mouth daily 90 tablet 1    Multiple Vitamins-Minerals (MULTIVITAMIN ADULT PO) Take by mouth      Omega-3 1000 MG CAPS Take by mouth      oxybutynin (DITROPAN-XL) 10 MG 24 hr tablet Take 1 tablet (10 mg total) by mouth daily at bedtime 90 tablet 1    Psyllium (METAMUCIL FIBER PO) Take by mouth      Red Yeast Rice 600 MG CAPS Take by mouth       No current facility-administered medications for this visit  Allergies   Allergen Reactions    Prednisone Tachycardia    Vicodin [Hydrocodone-Acetaminophen] Lightheadedness    Other Nasal Congestion     seasonal       Review of Systems   Constitutional: Negative for chills and fever     HENT: Positive for postnasal drip and sore throat  Respiratory: Positive for cough  Video Exam    There were no vitals filed for this visit  Physical Exam     I spent 8 minutes directly with the patient during this visit      VIRTUAL VISIT DISCLAIMER    Brandi Gill acknowledges that she has consented to an online visit or consultation  She understands that the online visit is based solely on information provided by her, and that, in the absence of a face-to-face physical evaluation by the physician, the diagnosis she receives is both limited and provisional in terms of accuracy and completeness  This is not intended to replace a full medical face-to-face evaluation by the physician  Brandi Gill understands and accepts these terms

## 2020-08-15 DIAGNOSIS — T78.40XD ALLERGIC DISORDER, SUBSEQUENT ENCOUNTER: ICD-10-CM

## 2020-08-17 RX ORDER — MONTELUKAST SODIUM 10 MG/1
10 TABLET ORAL DAILY
Qty: 90 TABLET | Refills: 1 | Status: SHIPPED | OUTPATIENT
Start: 2020-08-17 | End: 2021-02-15

## 2020-08-21 ENCOUNTER — OFFICE VISIT (OUTPATIENT)
Dept: OBGYN CLINIC | Facility: CLINIC | Age: 80
End: 2020-08-21
Payer: COMMERCIAL

## 2020-08-21 VITALS
BODY MASS INDEX: 35.82 KG/M2 | HEART RATE: 69 BPM | HEIGHT: 65 IN | WEIGHT: 215 LBS | SYSTOLIC BLOOD PRESSURE: 149 MMHG | DIASTOLIC BLOOD PRESSURE: 84 MMHG

## 2020-08-21 DIAGNOSIS — M18.11 PRIMARY OSTEOARTHRITIS OF FIRST CARPOMETACARPAL JOINT OF RIGHT HAND: Primary | ICD-10-CM

## 2020-08-21 PROCEDURE — 3077F SYST BP >= 140 MM HG: CPT | Performed by: ORTHOPAEDIC SURGERY

## 2020-08-21 PROCEDURE — 1036F TOBACCO NON-USER: CPT | Performed by: ORTHOPAEDIC SURGERY

## 2020-08-21 PROCEDURE — 3079F DIAST BP 80-89 MM HG: CPT | Performed by: ORTHOPAEDIC SURGERY

## 2020-08-21 PROCEDURE — 20600 DRAIN/INJ JOINT/BURSA W/O US: CPT | Performed by: ORTHOPAEDIC SURGERY

## 2020-08-21 PROCEDURE — 99213 OFFICE O/P EST LOW 20 MIN: CPT | Performed by: ORTHOPAEDIC SURGERY

## 2020-08-21 PROCEDURE — 4040F PNEUMOC VAC/ADMIN/RCVD: CPT | Performed by: ORTHOPAEDIC SURGERY

## 2020-08-21 PROCEDURE — 3008F BODY MASS INDEX DOCD: CPT | Performed by: ORTHOPAEDIC SURGERY

## 2020-08-21 PROCEDURE — 1160F RVW MEDS BY RX/DR IN RCRD: CPT | Performed by: ORTHOPAEDIC SURGERY

## 2020-08-21 RX ORDER — TRIAMCINOLONE ACETONIDE 40 MG/ML
20 INJECTION, SUSPENSION INTRA-ARTICULAR; INTRAMUSCULAR
Status: COMPLETED | OUTPATIENT
Start: 2020-08-21 | End: 2020-08-21

## 2020-08-21 RX ORDER — LIDOCAINE HYDROCHLORIDE 10 MG/ML
2.5 INJECTION, SOLUTION INFILTRATION; PERINEURAL
Status: COMPLETED | OUTPATIENT
Start: 2020-08-21 | End: 2020-08-21

## 2020-08-21 RX ADMIN — Medication 0.05 MEQ: at 11:15

## 2020-08-21 RX ADMIN — TRIAMCINOLONE ACETONIDE 20 MG: 40 INJECTION, SUSPENSION INTRA-ARTICULAR; INTRAMUSCULAR at 11:15

## 2020-08-21 RX ADMIN — LIDOCAINE HYDROCHLORIDE 2.5 ML: 10 INJECTION, SOLUTION INFILTRATION; PERINEURAL at 11:15

## 2020-08-21 NOTE — PROGRESS NOTES
CHIEF COMPLAINT:  Chief Complaint   Patient presents with    Right Hand - Follow-up       SUBJECTIVE:  Iram Cheema is a 68y o  year old female who presents for follow-up regarding right thumb CMC arthritis  Patient was previously seen on 05/15/2020 of which she received a right thumb CMC cortisone injection  Patient has had cortisone injections for the right thumb CMC joint on 02/18/2019, 09/16/2019, 05/15/2020  She was advised to follow up with us as needed  Today she presents with pain at the base of the thumb  She states it started few weeks ago when she has difficulty grabbing a cup  She denies any numbness or tingling        PAST MEDICAL HISTORY:  Past Medical History:   Diagnosis Date    Seasonal allergies        PAST SURGICAL HISTORY:  Past Surgical History:   Procedure Laterality Date    ANKLE SURGERY      x2    APPENDECTOMY      CHOLECYSTECTOMY      laparoscopic    GALLBLADDER SURGERY      HYSTERECTOMY Bilateral     total abdominal with removal of both ovaries, age 39    INCISION TENDON SHEATH HAND      of a finger    NEUROPLASTY / TRANSPOSITION MEDIAN NERVE AT CARPAL TUNNEL      OOPHORECTOMY Bilateral     Age 39    TUBAL LIGATION         FAMILY HISTORY:  Family History   Problem Relation Age of Onset    Heart disease Mother         endocarditis    Heart disease Father     Prostate cancer Father 80    No Known Problems Sister     No Known Problems Daughter     No Known Problems Maternal Grandmother     No Known Problems Maternal Grandfather     Kidney cancer Paternal Grandmother     No Known Problems Paternal Grandfather     No Known Problems Sister     No Known Problems Son     No Known Problems Son     No Known Problems Son     No Known Problems Maternal Aunt     No Known Problems Maternal Aunt     No Known Problems Paternal Aunt     Breast cancer Paternal Aunt 62    No Known Problems Paternal Aunt     No Known Problems Paternal Aunt     Breast cancer Paternal Aunt 64       SOCIAL HISTORY:  Social History     Tobacco Use    Smoking status: Never Smoker    Smokeless tobacco: Never Used   Substance Use Topics    Alcohol use: Yes     Frequency: 2-3 times a week     Drinks per session: 1 or 2     Comment: wine with dinner; occasional use as per [employer]    Drug use: No       MEDICATIONS:    Current Outpatient Medications:     Aspirin (ASPIR-81 PO), Aspir-81 81 MG Oral Tablet Delayed Release TAKE 1 TABLET (81 MG TOTAL) BY MOUTH DAILY    Refills: 0    Gretchen Halim DO;  Started 27-May-2016 Active, Disp: , Rfl:     Biotin 1 MG CAPS, Biotin 1 MG Oral Capsule  Refills: 0    Gretchen Halim DO;  Started 27-May-2016 Active, Disp: , Rfl:     Coral Calcium 1000 (390 Ca) MG TABS, Coral Calcium 500 MG Oral Tablet  Refills: 0    Gretchen Halim DO;  Started 27-May-2016 Active, Disp: , Rfl:     docusate sodium (COLACE) 100 mg capsule, Take by mouth 3 (three) times a day, Disp: , Rfl:     doxylamine (UNISON) 25 MG tablet, Sleep Aid 25 MG Oral Tablet  Refills: 0    Gretchen Halim DO;  Started 27-May-2016 Active, Disp: , Rfl:     Famotidine (PEPCID PO), Take 20 mg by mouth 2 (two) times a day  , Disp: , Rfl:     levothyroxine 125 mcg tablet, Take 1 tablet (125 mcg total) by mouth daily, Disp: 90 tablet, Rfl: 1    losartan (COZAAR) 25 mg tablet, TAKE 1 TABLET DAILY, Disp: 90 tablet, Rfl: 4    metoprolol succinate (TOPROL-XL) 50 mg 24 hr tablet, TAKE 1 TABLET DAILY, Disp: 90 tablet, Rfl: 3    montelukast (SINGULAIR) 10 mg tablet, Take 1 tablet (10 mg total) by mouth daily, Disp: 90 tablet, Rfl: 1    Multiple Vitamins-Minerals (MULTIVITAMIN ADULT PO), Take by mouth, Disp: , Rfl:     Omega-3 1000 MG CAPS, Take by mouth, Disp: , Rfl:     oxybutynin (DITROPAN-XL) 10 MG 24 hr tablet, Take 1 tablet (10 mg total) by mouth daily at bedtime, Disp: 90 tablet, Rfl: 1    Psyllium (METAMUCIL FIBER PO), Take by mouth, Disp: , Rfl:     Red Yeast Rice 600 MG CAPS, Take by mouth, Disp: , Rfl: ALLERGIES:  Allergies   Allergen Reactions    Prednisone Tachycardia    Vicodin [Hydrocodone-Acetaminophen] Lightheadedness    Other Nasal Congestion     seasonal       REVIEW OF SYSTEMS:  Review of Systems  ROS:   General: no fever, no chills  HEENT:  No loss of hearing or eyesight problems  Eyes:  No red eyes  Respiratory:  No coughing, shortness of breath or wheezing  Cardiovascular:  No chest pain, no palpitations  GI:  Abdomen soft nontender, denies nausea  Endocrine:  No muscle weakness, no frequent urination, no excessive thirst  Urinary:  No dysuria, no incontinence  Musculoskeletal: see HPI and PE  SKIN:  No skin rash, no dry skin  Neurological:  No headaches, no confusion  Psychiatric:  No suicide thoughts, no anxiety, no depression  Review of all other systems is negative    VITALS:  Vitals:    08/21/20 1054   BP: 149/84   Pulse: 69       LABS:  HgA1c: No results found for: HGBA1C  BMP:   Lab Results   Component Value Date    CALCIUM 9 9 07/22/2020    K 4 4 07/22/2020    CO2 27 07/22/2020     (H) 07/22/2020    BUN 14 07/22/2020    CREATININE 0 90 07/22/2020       _____________________________________________________  PHYSICAL EXAMINATION:  General: well developed and well nourished, alert, oriented times 3 and appears comfortable  Psychiatric: Normal  HEENT: Trachea Midline, No torticollis  Pulmonary: No audible wheezing or respiratory distress   Skin: No masses, erythema, lacerations, fluctation, ulcerations  Neurovascular: Sensation Intact to the Median, Ulnar, Radial Nerve, Motor Intact to the Median, Ulnar, Radial Nerve and Pulses Intact    MUSCULOSKELETAL EXAMINATION:  right CMC Exam:  No adduction contracture  No hyperextension deformity of MCP joint  Positive localized tenderness over radial and dorsal aspect of thumb (CMC joint)  Grind test is Positive for pain and Positive for crepitus  No triggering or tenderness over the A1 pulley  No pain with Finkelsteins maneuver ___________________________________________________  STUDIES REVIEWED:  No studies reviewed  PROCEDURES PERFORMED:  Small joint arthrocentesis: R thumb CMC  Date/Time: 8/21/2020 11:15 AM  Consent given by: patient  Site marked: site marked  Timeout: Immediately prior to procedure a time out was called to verify the correct patient, procedure, equipment, support staff and site/side marked as required   Supporting Documentation  Indications: pain   Procedure Details  Location: thumb - R thumb CMC  Preparation: Patient was prepped and draped in the usual sterile fashion  Needle size: 25 G  Approach: dorsal  Medications administered: 0 05 mEq sodium bicarbonate 8 4 %; 2 5 mL lidocaine 1 %; 20 mg triamcinolone acetonide 40 mg/mL    Patient tolerance: patient tolerated the procedure well with no immediate complications  Dressing:  Sterile dressing applied             _____________________________________________________  ASSESSMENT/PLAN:      Diagnoses and all orders for this visit:    Primary osteoarthritis of first carpometacarpal joint of right hand  - patient was given cortisone injection today  She tolerated well  - she was advised that if this were to return we will obtain a new x-ray of the right hand to evaluate her progression of arthritis  - she will follow-up with us as needed        Follow Up:  Return if symptoms worsen or fail to improve  Work/school status:  No restrictions    To Do Next Visit:  Re-evaluation of current issue, x-ray of right hand    General Discussions:  CMC Arthritis: The anatomy and physiology of carpometacarpal joint arthritis was discussed with the patient today in the office  Deterioration of the articular cartilage eventually leads to hypermobility at the thumb [facility] joint, resulting in joint subluxation, osteophyte formation, cystic changes within the trapezium and base of the first metacarpal, as well as subchondral sclerosis    Eventually, pain, limited mobility, and compensatory hyperextension at the metacarpophalangeal joint may develop  While normal activity and usage of the thumb joint may provide a painful experience to the patient, this typically does not result in damage to the thumb or hand  Treatment options include resting thumb spica splints to decreased joint edema, pain, and inflammation  Therapy exercises to strengthen the thenar musculature may relieve pain, but do not alter the overall continued development of osteoarthritis  Oral medications, topical medications, corticosteroid injections may decrease pain and increase overall function  Eventually, approximately 5% of patients may require surgical intervention                                                                                                                                                                                                   Scribe Attestation    I,:   Jacky Cunha PA-C am acting as a scribe while in the presence of the attending physician :        I,:   Donald Singer MD personally performed the services described in this documentation    as scribed in my presence :

## 2020-11-16 ENCOUNTER — VBI (OUTPATIENT)
Dept: ADMINISTRATIVE | Facility: OTHER | Age: 80
End: 2020-11-16

## 2020-11-17 ENCOUNTER — OFFICE VISIT (OUTPATIENT)
Dept: FAMILY MEDICINE CLINIC | Facility: CLINIC | Age: 80
End: 2020-11-17
Payer: COMMERCIAL

## 2020-11-17 VITALS
SYSTOLIC BLOOD PRESSURE: 148 MMHG | RESPIRATION RATE: 16 BRPM | HEART RATE: 72 BPM | WEIGHT: 227 LBS | OXYGEN SATURATION: 96 % | HEIGHT: 65 IN | DIASTOLIC BLOOD PRESSURE: 82 MMHG | BODY MASS INDEX: 37.82 KG/M2 | TEMPERATURE: 96.3 F

## 2020-11-17 DIAGNOSIS — E78.5 DYSLIPIDEMIA: ICD-10-CM

## 2020-11-17 DIAGNOSIS — R53.83 FATIGUE, UNSPECIFIED TYPE: ICD-10-CM

## 2020-11-17 DIAGNOSIS — E03.9 HYPOTHYROIDISM, UNSPECIFIED TYPE: ICD-10-CM

## 2020-11-17 DIAGNOSIS — I10 HYPERTENSION, UNSPECIFIED TYPE: Primary | ICD-10-CM

## 2020-11-17 DIAGNOSIS — E66.01 OBESITY, MORBID (HCC): ICD-10-CM

## 2020-11-17 DIAGNOSIS — N32.81 OVERACTIVE BLADDER: ICD-10-CM

## 2020-11-17 PROCEDURE — 99214 OFFICE O/P EST MOD 30 MIN: CPT | Performed by: INTERNAL MEDICINE

## 2020-11-17 PROCEDURE — 3725F SCREEN DEPRESSION PERFORMED: CPT | Performed by: INTERNAL MEDICINE

## 2020-11-17 PROCEDURE — 3079F DIAST BP 80-89 MM HG: CPT | Performed by: INTERNAL MEDICINE

## 2020-11-17 PROCEDURE — 1160F RVW MEDS BY RX/DR IN RCRD: CPT | Performed by: INTERNAL MEDICINE

## 2020-11-17 PROCEDURE — 3077F SYST BP >= 140 MM HG: CPT | Performed by: INTERNAL MEDICINE

## 2020-11-17 PROCEDURE — 3288F FALL RISK ASSESSMENT DOCD: CPT | Performed by: INTERNAL MEDICINE

## 2020-11-17 PROCEDURE — 1101F PT FALLS ASSESS-DOCD LE1/YR: CPT | Performed by: INTERNAL MEDICINE

## 2020-11-17 PROCEDURE — 1036F TOBACCO NON-USER: CPT | Performed by: INTERNAL MEDICINE

## 2020-11-17 RX ORDER — LOSARTAN POTASSIUM 50 MG/1
50 TABLET ORAL DAILY
Qty: 90 TABLET | Refills: 1 | Status: SHIPPED | OUTPATIENT
Start: 2020-11-17 | End: 2021-05-10

## 2021-01-04 DIAGNOSIS — E03.9 HYPOTHYROIDISM, UNSPECIFIED TYPE: ICD-10-CM

## 2021-01-04 DIAGNOSIS — N32.81 OVERACTIVE BLADDER: ICD-10-CM

## 2021-01-04 RX ORDER — OXYBUTYNIN CHLORIDE 10 MG/1
TABLET, EXTENDED RELEASE ORAL
Qty: 90 TABLET | Refills: 1 | Status: SHIPPED | OUTPATIENT
Start: 2021-01-04 | End: 2021-06-15

## 2021-01-04 RX ORDER — LEVOTHYROXINE SODIUM 0.12 MG/1
TABLET ORAL
Qty: 90 TABLET | Refills: 1 | Status: SHIPPED | OUTPATIENT
Start: 2021-01-04 | End: 2021-07-17

## 2021-01-15 ENCOUNTER — IMMUNIZATIONS (OUTPATIENT)
Dept: FAMILY MEDICINE CLINIC | Facility: HOSPITAL | Age: 81
End: 2021-01-15

## 2021-01-15 DIAGNOSIS — Z23 ENCOUNTER FOR IMMUNIZATION: Primary | ICD-10-CM

## 2021-01-15 PROCEDURE — 0001A SARS-COV-2 / COVID-19 MRNA VACCINE (PFIZER-BIONTECH) 30 MCG: CPT

## 2021-01-15 PROCEDURE — 91300 SARS-COV-2 / COVID-19 MRNA VACCINE (PFIZER-BIONTECH) 30 MCG: CPT

## 2021-01-27 ENCOUNTER — VBI (OUTPATIENT)
Dept: ADMINISTRATIVE | Facility: OTHER | Age: 81
End: 2021-01-27

## 2021-02-08 ENCOUNTER — IMMUNIZATIONS (OUTPATIENT)
Dept: FAMILY MEDICINE CLINIC | Facility: HOSPITAL | Age: 81
End: 2021-02-08

## 2021-02-08 DIAGNOSIS — Z23 ENCOUNTER FOR IMMUNIZATION: Primary | ICD-10-CM

## 2021-02-08 PROCEDURE — 91300 SARS-COV-2 / COVID-19 MRNA VACCINE (PFIZER-BIONTECH) 30 MCG: CPT

## 2021-02-08 PROCEDURE — 0002A SARS-COV-2 / COVID-19 MRNA VACCINE (PFIZER-BIONTECH) 30 MCG: CPT

## 2021-02-11 DIAGNOSIS — T78.40XD ALLERGIC DISORDER, SUBSEQUENT ENCOUNTER: ICD-10-CM

## 2021-02-15 RX ORDER — MONTELUKAST SODIUM 10 MG/1
10 TABLET ORAL DAILY
Qty: 90 TABLET | Refills: 1 | Status: SHIPPED | OUTPATIENT
Start: 2021-02-15 | End: 2021-08-27

## 2021-04-02 ENCOUNTER — APPOINTMENT (OUTPATIENT)
Dept: LAB | Facility: CLINIC | Age: 81
End: 2021-04-02
Payer: COMMERCIAL

## 2021-04-02 ENCOUNTER — OFFICE VISIT (OUTPATIENT)
Dept: OBGYN CLINIC | Facility: CLINIC | Age: 81
End: 2021-04-02
Payer: COMMERCIAL

## 2021-04-02 VITALS
DIASTOLIC BLOOD PRESSURE: 83 MMHG | SYSTOLIC BLOOD PRESSURE: 147 MMHG | HEIGHT: 65 IN | WEIGHT: 227 LBS | HEART RATE: 69 BPM | BODY MASS INDEX: 37.82 KG/M2

## 2021-04-02 DIAGNOSIS — E03.9 HYPOTHYROIDISM, UNSPECIFIED TYPE: ICD-10-CM

## 2021-04-02 DIAGNOSIS — R53.83 FATIGUE, UNSPECIFIED TYPE: ICD-10-CM

## 2021-04-02 DIAGNOSIS — I10 HYPERTENSION, UNSPECIFIED TYPE: ICD-10-CM

## 2021-04-02 DIAGNOSIS — M18.11 PRIMARY OSTEOARTHRITIS OF FIRST CARPOMETACARPAL JOINT OF RIGHT HAND: Primary | ICD-10-CM

## 2021-04-02 DIAGNOSIS — E78.5 DYSLIPIDEMIA: ICD-10-CM

## 2021-04-02 LAB
ALBUMIN SERPL BCP-MCNC: 4.1 G/DL (ref 3.5–5)
ALP SERPL-CCNC: 70 U/L (ref 46–116)
ALT SERPL W P-5'-P-CCNC: 42 U/L (ref 12–78)
ANION GAP SERPL CALCULATED.3IONS-SCNC: 6 MMOL/L (ref 4–13)
AST SERPL W P-5'-P-CCNC: 25 U/L (ref 5–45)
BILIRUB SERPL-MCNC: 0.63 MG/DL (ref 0.2–1)
BUN SERPL-MCNC: 22 MG/DL (ref 5–25)
CALCIUM SERPL-MCNC: 9.9 MG/DL (ref 8.3–10.1)
CHLORIDE SERPL-SCNC: 106 MMOL/L (ref 100–108)
CHOLEST SERPL-MCNC: 214 MG/DL (ref 50–200)
CO2 SERPL-SCNC: 27 MMOL/L (ref 21–32)
CREAT SERPL-MCNC: 0.93 MG/DL (ref 0.6–1.3)
ERYTHROCYTE [DISTWIDTH] IN BLOOD BY AUTOMATED COUNT: 12.3 % (ref 11.6–15.1)
GFR SERPL CREATININE-BSD FRML MDRD: 59 ML/MIN/1.73SQ M
GLUCOSE P FAST SERPL-MCNC: 88 MG/DL (ref 65–99)
HCT VFR BLD AUTO: 48.7 % (ref 34.8–46.1)
HDLC SERPL-MCNC: 79 MG/DL
HGB BLD-MCNC: 16.4 G/DL (ref 11.5–15.4)
LDLC SERPL CALC-MCNC: 97 MG/DL (ref 0–100)
MCH RBC QN AUTO: 34 PG (ref 26.8–34.3)
MCHC RBC AUTO-ENTMCNC: 33.7 G/DL (ref 31.4–37.4)
MCV RBC AUTO: 101 FL (ref 82–98)
NONHDLC SERPL-MCNC: 135 MG/DL
PLATELET # BLD AUTO: 236 THOUSANDS/UL (ref 149–390)
PMV BLD AUTO: 12.2 FL (ref 8.9–12.7)
POTASSIUM SERPL-SCNC: 5.1 MMOL/L (ref 3.5–5.3)
PROT SERPL-MCNC: 7.7 G/DL (ref 6.4–8.2)
RBC # BLD AUTO: 4.82 MILLION/UL (ref 3.81–5.12)
SODIUM SERPL-SCNC: 139 MMOL/L (ref 136–145)
TRIGL SERPL-MCNC: 190 MG/DL
TSH SERPL DL<=0.05 MIU/L-ACNC: 2.17 UIU/ML (ref 0.36–3.74)
WBC # BLD AUTO: 6.42 THOUSAND/UL (ref 4.31–10.16)

## 2021-04-02 PROCEDURE — 3077F SYST BP >= 140 MM HG: CPT | Performed by: ORTHOPAEDIC SURGERY

## 2021-04-02 PROCEDURE — 20600 DRAIN/INJ JOINT/BURSA W/O US: CPT | Performed by: ORTHOPAEDIC SURGERY

## 2021-04-02 PROCEDURE — 36415 COLL VENOUS BLD VENIPUNCTURE: CPT

## 2021-04-02 PROCEDURE — 85027 COMPLETE CBC AUTOMATED: CPT

## 2021-04-02 PROCEDURE — 80053 COMPREHEN METABOLIC PANEL: CPT

## 2021-04-02 PROCEDURE — 84443 ASSAY THYROID STIM HORMONE: CPT

## 2021-04-02 PROCEDURE — 1036F TOBACCO NON-USER: CPT | Performed by: ORTHOPAEDIC SURGERY

## 2021-04-02 PROCEDURE — 1160F RVW MEDS BY RX/DR IN RCRD: CPT | Performed by: ORTHOPAEDIC SURGERY

## 2021-04-02 PROCEDURE — 99213 OFFICE O/P EST LOW 20 MIN: CPT | Performed by: ORTHOPAEDIC SURGERY

## 2021-04-02 PROCEDURE — 80061 LIPID PANEL: CPT

## 2021-04-02 PROCEDURE — 3079F DIAST BP 80-89 MM HG: CPT | Performed by: ORTHOPAEDIC SURGERY

## 2021-04-02 RX ORDER — TRIAMCINOLONE ACETONIDE 40 MG/ML
20 INJECTION, SUSPENSION INTRA-ARTICULAR; INTRAMUSCULAR
Status: COMPLETED | OUTPATIENT
Start: 2021-04-02 | End: 2021-04-02

## 2021-04-02 RX ORDER — LIDOCAINE HYDROCHLORIDE 10 MG/ML
2.5 INJECTION, SOLUTION INFILTRATION; PERINEURAL
Status: COMPLETED | OUTPATIENT
Start: 2021-04-02 | End: 2021-04-02

## 2021-04-02 RX ADMIN — LIDOCAINE HYDROCHLORIDE 2.5 ML: 10 INJECTION, SOLUTION INFILTRATION; PERINEURAL at 11:26

## 2021-04-02 RX ADMIN — Medication 0.05 MEQ: at 11:26

## 2021-04-02 RX ADMIN — TRIAMCINOLONE ACETONIDE 20 MG: 40 INJECTION, SUSPENSION INTRA-ARTICULAR; INTRAMUSCULAR at 11:26

## 2021-04-02 NOTE — PROGRESS NOTES
CHIEF COMPLAINT:  Chief Complaint   Patient presents with    Right Thumb - Follow-up       SUBJECTIVE:  Radha Vasquez is a 68y o  year old  female who presents for a follow up of right thumb CMC arthritis  Patient last received a corticosteroid injection on 8/21/2020  Patient stated that she tolerated the injection well  Patient stated that her right thumb has recently became painful again  Patient denied any injuries about the right thumb         PAST MEDICAL HISTORY:  Past Medical History:   Diagnosis Date    Seasonal allergies        PAST SURGICAL HISTORY:  Past Surgical History:   Procedure Laterality Date    ANKLE SURGERY      x2    APPENDECTOMY      CHOLECYSTECTOMY      laparoscopic    GALLBLADDER SURGERY      HYSTERECTOMY Bilateral     total abdominal with removal of both ovaries, age 39    INCISION TENDON SHEATH HAND      of a finger    NEUROPLASTY / TRANSPOSITION MEDIAN NERVE AT CARPAL TUNNEL      OOPHORECTOMY Bilateral     Age 39    TUBAL LIGATION         FAMILY HISTORY:  Family History   Problem Relation Age of Onset    Heart disease Mother         endocarditis    Heart disease Father     Prostate cancer Father 80    No Known Problems Sister     No Known Problems Daughter     No Known Problems Maternal Grandmother     No Known Problems Maternal Grandfather     Kidney cancer Paternal Grandmother     No Known Problems Paternal Grandfather     No Known Problems Sister     No Known Problems Son     No Known Problems Son     No Known Problems Son     No Known Problems Maternal Aunt     No Known Problems Maternal Aunt     No Known Problems Paternal Aunt     Breast cancer Paternal Aunt 62    No Known Problems Paternal Aunt     No Known Problems Paternal Aunt     Breast cancer Paternal Aunt 64       SOCIAL HISTORY:  Social History     Tobacco Use    Smoking status: Never Smoker    Smokeless tobacco: Never Used   Substance Use Topics    Alcohol use: Yes     Frequency: 2-3 times a week     Drinks per session: 1 or 2     Comment: wine with dinner; occasional use as per [employer]    Drug use: No       MEDICATIONS:    Current Outpatient Medications:     Aspirin (ASPIR-81 PO), Aspir-81 81 MG Oral Tablet Delayed Release TAKE 1 TABLET (81 MG TOTAL) BY MOUTH DAILY    Refills: 0    Ardia Peterson DO;  Started 27-May-2016 Active, Disp: , Rfl:     Biotin 1 MG CAPS, Biotin 1 MG Oral Capsule  Refills: 0    Ardia Peterson DO;  Started 27-May-2016 Active, Disp: , Rfl:     Coral Calcium 1000 (390 Ca) MG TABS, Coral Calcium 500 MG Oral Tablet  Refills: 0    Ardia Peterson DO;  Started 27-May-2016 Active, Disp: , Rfl:     docusate sodium (COLACE) 100 mg capsule, Take by mouth 3 (three) times a day, Disp: , Rfl:     doxylamine (UNISON) 25 MG tablet, Sleep Aid 25 MG Oral Tablet  Refills: 0    Ardia Peterson DO;  Started 27-May-2016 Active, Disp: , Rfl:     Famotidine (PEPCID PO), Take 20 mg by mouth 2 (two) times a day  , Disp: , Rfl:     levothyroxine 125 mcg tablet, TAKE 1 TABLET DAILY, Disp: 90 tablet, Rfl: 1    losartan (COZAAR) 50 mg tablet, Take 1 tablet (50 mg total) by mouth daily, Disp: 90 tablet, Rfl: 1    metoprolol succinate (TOPROL-XL) 50 mg 24 hr tablet, TAKE 1 TABLET DAILY, Disp: 90 tablet, Rfl: 3    montelukast (SINGULAIR) 10 mg tablet, Take 1 tablet (10 mg total) by mouth daily, Disp: 90 tablet, Rfl: 1    Multiple Vitamins-Minerals (MULTIVITAMIN ADULT PO), Take by mouth, Disp: , Rfl:     Omega-3 1000 MG CAPS, Take by mouth, Disp: , Rfl:     oxybutynin (DITROPAN-XL) 10 MG 24 hr tablet, TAKE 1 TABLET DAILY AT BEDTIME, Disp: 90 tablet, Rfl: 1    Psyllium (METAMUCIL FIBER PO), Take by mouth, Disp: , Rfl:     Red Yeast Rice 600 MG CAPS, Take by mouth, Disp: , Rfl:     ALLERGIES:  Allergies   Allergen Reactions    Prednisone Tachycardia    Vicodin [Hydrocodone-Acetaminophen] Lightheadedness    Other Nasal Congestion     seasonal       REVIEW OF SYSTEMS:  Review of Systems Constitutional: Negative for chills and fever  HENT: Negative for ear pain and sore throat  Eyes: Negative for pain and visual disturbance  Respiratory: Negative for cough and shortness of breath  Cardiovascular: Negative for chest pain and palpitations  Gastrointestinal: Negative for abdominal pain and vomiting  Genitourinary: Negative for dysuria and hematuria  Musculoskeletal: Positive for arthralgias  Negative for back pain  Skin: Negative for color change and rash  Neurological: Negative for seizures and syncope  All other systems reviewed and are negative  VITALS:  Vitals:    04/02/21 1030   BP: 147/83   Pulse: 69       LABS:  HgA1c: No results found for: HGBA1C  BMP:   Lab Results   Component Value Date    CALCIUM 9 9 07/22/2020    K 4 4 07/22/2020    CO2 27 07/22/2020     (H) 07/22/2020    BUN 14 07/22/2020    CREATININE 0 90 07/22/2020       _____________________________________________________  PHYSICAL EXAMINATION:  General: well developed and well nourished, alert, oriented times 3 and appears comfortable  Psychiatric: Normal  HEENT: Trachea Midline, No torticollis  Pulmonary: No audible wheezing or strider  Cardiovascular: No discernable arrhythmia   Skin: No masses, erythema, lacerations, fluctation, ulcerations  Neurovascular: Sensation Intact to the Median, Ulnar, Radial Nerve, Motor Intact to the Median, Ulnar, Radial Nerve and Pulses Intact    MUSCULOSKELETAL EXAMINATION:  Right Thumb:   Skin intact   No obvious swelling   No erythema or ecchymosis  Tenderness over thumb CMC noted   No hyperextension deformity of MCP joint   Grind test positive for pain and crepitus   No triggering over A1 pulley   Sensation intact   Brisk capillary refill     ___________________________________________________  STUDIES REVIEWED:  No studies reviewed  PROCEDURES PERFORMED:  Small joint arthrocentesis: R thumb CMC  [place] Protocol:  Consent: Verbal consent obtained    Risks and benefits: risks, benefits and alternatives were discussed  Consent given by: patient  Time out: Immediately prior to procedure a "time out" was called to verify the correct patient, procedure, equipment, support staff and site/side marked as required  Timeout called at: 4/2/2021 11:50 AM   Patient understanding: patient states understanding of the procedure being performed  Site marked: the operative site was marked  Patient identity confirmed: verbally with patient    Supporting Documentation  Indications: pain   Procedure Details  Location: thumb - R thumb CMC  Needle size: 25 G  Ultrasound guidance: no  Medications administered: 0 05 mEq sodium bicarbonate 8 4 %; 2 5 mL lidocaine 1 %; 20 mg triamcinolone acetonide 40 mg/mL    Patient tolerance: patient tolerated the procedure well with no immediate complications  Dressing:  Sterile dressing applied             _____________________________________________________  ASSESSMENT/PLAN:    68 y o  female with right thumb CMC arthritis    -Patient was offered a corticosteroid about the right thumb in office today    -Risks and benefits of the injection were discussed at length in office today    -Patient tolerated the injection well  -I will see her back in office on an as needed basis if symptoms worsen or fail to improve  Diagnoses and all orders for this visit:    Primary osteoarthritis of first carpometacarpal joint of right hand        Follow Up:  Return if symptoms worsen or fail to improve  Work/school status:  No restrictions     To Do Next Visit:   PRN     General Discussions:  CMC Arthritis: The anatomy and physiology of carpometacarpal joint arthritis was discussed with the patient today in the office    Deterioration of the articular cartilage eventually leads to hypermobility at the thumb [facility] joint, resulting in joint subluxation, osteophyte formation, cystic changes within the trapezium and base of the first metacarpal, as well as subchondral sclerosis  Eventually, pain, limited mobility, and compensatory hyperextension at the metacarpophalangeal joint may develop  While normal activity and usage of the thumb joint may provide a painful experience to the patient, this typically does not result in damage to the thumb or hand  Treatment options include resting thumb spica splints to decreased joint edema, pain, and inflammation  Therapy exercises to strengthen the thenar musculature may relieve pain, but do not alter the overall continued development of osteoarthritis  Oral medications, topical medications, corticosteroid injections may decrease pain and increase overall function  Eventually, approximately 5% of patients may require surgical intervention  Scribe Attestation    I,:  Delores Medeiros am acting as a scribe while in the presence of the attending physician :       I,:  Monica De La Paz MD personally performed the services described in this documentation    as scribed in my presence :           Portions of the record may have been created with voice recognition software  Occasional wrong word or "sound a like" substitutions may have occurred due to the inherent limitations of voice recognition software  Read the chart carefully and recognize, using context, where substitutions have occurred

## 2021-05-09 DIAGNOSIS — I10 HYPERTENSION, UNSPECIFIED TYPE: ICD-10-CM

## 2021-05-10 RX ORDER — LOSARTAN POTASSIUM 50 MG/1
50 TABLET ORAL DAILY
Qty: 90 TABLET | Refills: 1 | Status: SHIPPED | OUTPATIENT
Start: 2021-05-10 | End: 2021-10-19

## 2021-05-11 ENCOUNTER — RA CDI HCC (OUTPATIENT)
Dept: OTHER | Facility: HOSPITAL | Age: 81
End: 2021-05-11

## 2021-05-11 NOTE — PROGRESS NOTES
Based on clinical documentation indicated in your record, it appears that the patient may have the following conditions:    E66 01 Morbid obesity (BMI 37 77 with htn)    If this is correct, please document and assess at your next visit May 18th    [facility]  coding opportunities             Chart reviewed, (number of) suggestions sent to provider: 1           Patients insurance company: [employer] (Medicare Advantage and Commercial)             [facility] [employer]  coding opportunities             Chart reviewed, (number of) suggestions sent to provider: 1           Patients insurance company: [employer] (Medicare Advantage and [employer])     Visit status: Patient canceled the appointment     Provider never responded to [facility] [employer]  coding request

## 2021-05-14 ENCOUNTER — RA CDI HCC (OUTPATIENT)
Dept: OTHER | Facility: HOSPITAL | Age: 81
End: 2021-05-14

## 2021-05-14 NOTE — PROGRESS NOTES
Re-scheduled to 5/21    [place] [address]  coding opportunities             Chart reviewed, (number of) suggestions sent to provider: 1           Patients insurance company: [employer] (Medicare Advantage and Commercial)           Based on clinical documentation indicated in your record, it appears that the patient may have the following conditions:     E66 01 Morbid obesity (BMI 37 77 with htn)     If this is correct, please document and assess at your next visit May 21st     [place] [address]  coding opportunities             Chart reviewed, (number of) suggestions sent to provider: 1           Patients insurance company: [employer] (Medicare Advantage and Commercial)     Visit status: Patient arrived for their scheduled appointment     Provider never responded to [place] [address]  coding request

## 2021-05-21 ENCOUNTER — OFFICE VISIT (OUTPATIENT)
Dept: FAMILY MEDICINE CLINIC | Facility: CLINIC | Age: 81
End: 2021-05-21
Payer: COMMERCIAL

## 2021-05-21 VITALS
DIASTOLIC BLOOD PRESSURE: 74 MMHG | SYSTOLIC BLOOD PRESSURE: 138 MMHG | HEIGHT: 65 IN | HEART RATE: 75 BPM | OXYGEN SATURATION: 98 % | BODY MASS INDEX: 37.99 KG/M2 | TEMPERATURE: 97.6 F | WEIGHT: 228 LBS | RESPIRATION RATE: 18 BRPM

## 2021-05-21 DIAGNOSIS — E03.9 HYPOTHYROIDISM, UNSPECIFIED TYPE: ICD-10-CM

## 2021-05-21 DIAGNOSIS — E66.01 OBESITY, MORBID (HCC): ICD-10-CM

## 2021-05-21 DIAGNOSIS — Z00.00 MEDICARE ANNUAL WELLNESS VISIT, SUBSEQUENT: Primary | ICD-10-CM

## 2021-05-21 DIAGNOSIS — I10 HYPERTENSION, UNSPECIFIED TYPE: ICD-10-CM

## 2021-05-21 DIAGNOSIS — N32.81 OVERACTIVE BLADDER: ICD-10-CM

## 2021-05-21 PROCEDURE — G0439 PPPS, SUBSEQ VISIT: HCPCS | Performed by: INTERNAL MEDICINE

## 2021-05-21 PROCEDURE — 3725F SCREEN DEPRESSION PERFORMED: CPT | Performed by: INTERNAL MEDICINE

## 2021-05-21 PROCEDURE — 99214 OFFICE O/P EST MOD 30 MIN: CPT | Performed by: INTERNAL MEDICINE

## 2021-05-21 PROCEDURE — 3075F SYST BP GE 130 - 139MM HG: CPT | Performed by: INTERNAL MEDICINE

## 2021-05-21 PROCEDURE — 3078F DIAST BP <80 MM HG: CPT | Performed by: INTERNAL MEDICINE

## 2021-05-21 PROCEDURE — 1160F RVW MEDS BY RX/DR IN RCRD: CPT | Performed by: INTERNAL MEDICINE

## 2021-05-21 PROCEDURE — 1036F TOBACCO NON-USER: CPT | Performed by: INTERNAL MEDICINE

## 2021-05-21 PROCEDURE — 1125F AMNT PAIN NOTED PAIN PRSNT: CPT | Performed by: INTERNAL MEDICINE

## 2021-05-21 PROCEDURE — 1170F FXNL STATUS ASSESSED: CPT | Performed by: INTERNAL MEDICINE

## 2021-05-21 PROCEDURE — 3288F FALL RISK ASSESSMENT DOCD: CPT | Performed by: INTERNAL MEDICINE

## 2021-05-21 NOTE — PROGRESS NOTES
Assessment and Plan:     Problem List Items Addressed This Visit     None        BMI Counseling: Body mass index is 37 94 kg/m²  The BMI is above normal  Nutrition recommendations include decreasing portion sizes and limiting drinks that contain sugar  Exercise recommendations include exercising 3-5 times per week  No pharmacotherapy was ordered  Patient referred to PCP due to patient being overweight  Preventive health issues were discussed with patient, and age appropriate screening tests were ordered as noted in patient's After Visit Summary  Personalized health advice and appropriate referrals for health education or preventive services given if needed, as noted in patient's After Visit Summary       History of Present Illness:     Patient presents for Medicare Annual Wellness visit    Patient Care Team:  Carlie Gimenez DO as PCP - General     Problem List:     Patient Active Problem List   Diagnosis    Hypertension    Hypothyroidism    Overactive bladder    Palpitations    EKG, abnormal    Primary osteoarthritis of right knee    Chronic pain of right knee    Seasonal allergies    Obesity, morbid ([address] )      Past Medical and Surgical History:     Past Medical History:   Diagnosis Date    Seasonal allergies      Past Surgical History:   Procedure Laterality Date    ANKLE SURGERY      x2    APPENDECTOMY      CHOLECYSTECTOMY      laparoscopic    GALLBLADDER SURGERY      HYSTERECTOMY Bilateral     total abdominal with removal of both ovaries, age 39    INCISION TENDON SHEATH HAND      of a finger    NEUROPLASTY / TRANSPOSITION MEDIAN NERVE AT CARPAL TUNNEL      OOPHORECTOMY Bilateral     Age 39    TUBAL LIGATION        Family History:     Family History   Problem Relation Age of Onset    Heart disease Mother         endocarditis    Heart disease Father     Prostate cancer Father 80    No Known Problems Sister     No Known Problems Daughter     No Known Problems Maternal Grandmother  No Known Problems Maternal Grandfather     Kidney cancer Paternal Grandmother     No Known Problems Paternal Grandfather     No Known Problems Sister     No Known Problems Son     No Known Problems Son     No Known Problems Son     No Known Problems Maternal Aunt     No Known Problems Maternal Aunt     No Known Problems Paternal Aunt     Breast cancer Paternal Aunt 62    No Known Problems Paternal Aunt     No Known Problems Paternal Aunt     Breast cancer Paternal Aunt 64      Social History:     E-Cigarette/Vaping    E-Cigarette Use Never User      E-Cigarette/Vaping Substances    Nicotine No     THC No     CBD No     Flavoring No     Other No     Unknown No      Social History     Socioeconomic History    Marital status:       Spouse name: None    Number of children: None    Years of education: None    Highest education level: None   Occupational History    None   Social Needs    Financial resource strain: None    Food insecurity     Worry: None     Inability: None    Transportation needs     Medical: None     Non-medical: None   Tobacco Use    Smoking status: Never Smoker    Smokeless tobacco: Never Used   Substance and Sexual Activity    Alcohol use: Yes     Frequency: 2-3 times a week     Drinks per session: 1 or 2     Comment: wine with dinner; occasional use as per [employer]    Drug use: No    Sexual activity: None   Lifestyle    Physical activity     Days per week: None     Minutes per session: None    Stress: None   Relationships    Social connections     Talks on phone: None     Gets together: None     Attends [employer] service: None     Active member of club or organization: None     Attends meetings of clubs or organizations: None     Relationship status: None    Intimate partner violence     Fear of current or ex partner: None     Emotionally abused: None     Physically abused: None     Forced sexual activity: None   Other Topics Concern    None   Social History Narrative    None      Medications and Allergies:     Current Outpatient Medications   Medication Sig Dispense Refill    Aspirin (ASPIR-81 PO) Aspir-81 81 MG Oral Tablet Delayed Release  TAKE 1 TABLET (81 MG TOTAL) BY MOUTH DAILY  Refills: 0       Cindy Peña DO;  Started 27-May-2016  Active      Biotin 1 MG CAPS Biotin 1 MG Oral Capsule   Refills: 0       Glorietta [place] DO;  Started 27-May-2016  Active      Coral Calcium 1000 (390 Ca) MG TABS Coral Calcium 500 MG Oral Tablet   Refills: 0       Glorietta [place] DO;  Started 27-May-2016  Active      docusate sodium (COLACE) 100 mg capsule Take by mouth 3 (three) times a day      doxylamine (UNISON) 25 MG tablet Sleep Aid 25 MG Oral Tablet   Refills: 0       Cicirietta [place] DO;  Started 27-May-2016  Active      Famotidine (PEPCID PO) Take 20 mg by mouth 2 (two) times a day        levothyroxine 125 mcg tablet TAKE 1 TABLET DAILY 90 tablet 1    losartan (COZAAR) 50 mg tablet Take 1 tablet (50 mg total) by mouth daily 90 tablet 1    metoprolol succinate (TOPROL-XL) 50 mg 24 hr tablet TAKE 1 TABLET DAILY 90 tablet 3    montelukast (SINGULAIR) 10 mg tablet Take 1 tablet (10 mg total) by mouth daily 90 tablet 1    Multiple Vitamins-Minerals (MULTIVITAMIN ADULT PO) Take by mouth      Omega-3 1000 MG CAPS Take by mouth      oxybutynin (DITROPAN-XL) 10 MG 24 hr tablet TAKE 1 TABLET DAILY AT BEDTIME 90 tablet 1    Psyllium (METAMUCIL FIBER PO) Take by mouth      Red Yeast Rice 600 MG CAPS Take by mouth       No current facility-administered medications for this visit        Allergies   Allergen Reactions    Prednisone Tachycardia    Vicodin [Hydrocodone-Acetaminophen] Lightheadedness    Other Nasal Congestion     seasonal      Immunizations:     Immunization History   Administered Date(s) Administered    INFLUENZA 09/15/2016, 09/14/2018, 09/13/2019    Pneumococcal Conjugate 13-Valent 09/15/2016    Pneumococcal Polysaccharide PPV23 10/01/2014    SARS-CoV-2 / COVID-19 mRNA IM (Pfizer-BioNTech) 01/15/2021, 02/08/2021    Tdap 09/15/2016    Zoster 07/31/2017      Health Maintenance:         Topic Date Due    DXA SCAN  06/07/2021     There are no preventive care reminders to display for this patient  Medicare Health Risk Assessment:     /74   Pulse 75   Temp 97 6 °F (36 4 °C)   Resp 18   Ht 5' 5" (1 651 m)   Wt 103 kg (228 lb)   SpO2 98%   BMI 37 94 kg/m²      Davina Blanco is here for her Subsequent Wellness visit  Last Medicare Wellness visit information reviewed, patient interviewed and updates made to the record today  Health Risk Assessment:   Patient rates overall health as good  Patient feels that their physical health rating is same  Patient is satisfied with their life  Eyesight was rated as same  Hearing was rated as same  Patient feels that their emotional and mental health rating is same  Patients states they are never, rarely angry  Patient states they are sometimes unusually tired/fatigued  Pain experienced in the last 7 days has been none  Patient states that she has experienced no weight loss or gain in last 6 months  Depression Screening:   PHQ-2 Score: 0      Fall Risk Screening: In the past year, patient has experienced: no history of falling in past year      Urinary Incontinence Screening:   Patient has leaked urine accidently in the last six months  Home Safety:  Patient does not have trouble with stairs inside or outside of their home  Patient has working smoke alarms and has working carbon monoxide detector  Home safety hazards include: none  Medications:   Patient is currently taking over-the-counter supplements  OTC medications include: see medication list  Patient is able to manage medications       Activities of Daily Living (ADLs)/Instrumental Activities of Daily Living (IADLs):   Walk and transfer into and out of bed and chair?: Yes  Dress and groom yourself?: Yes    Bathe or shower yourself?: Yes Feed yourself? Yes  Do your laundry/housekeeping?: Yes  Manage your money, pay your bills and track your expenses?: Yes  Make your own meals?: Yes    Do your own shopping?: Yes    Previous Hospitalizations:   Any hospitalizations or ED visits within the last 12 months?: No      Advance Care Planning:   Living will: Yes    Durable POA for healthcare: Yes    Advanced directive: Yes      Comments: Daughter flori hoyt, son nick Bejarano Pore SCREENINGS      Cardiovascular Screening:    General: Screening Current      Diabetes Screening:     General: Screening Current      Breast Cancer Screening:     General: Screening Current      Cervical Cancer Screening:    General: Screening Not Indicated      Osteoporosis Screening:    General: Screening Current      Lung Cancer Screening:     General: Screening Not Indicated    Screening, Brief Intervention, and Referral to Treatment (SBIRT)    Screening  Typical number of drinks in a day: 1  Typical number of drinks in a week: 7  Interpretation: Low risk drinking behavior  Brief Intervention  Alcohol & drug use screenings were reviewed  No concerns regarding substance use disorder identified         Leon Loges, DO

## 2021-05-21 NOTE — PROGRESS NOTES
Assessment/Plan:         Diagnoses and all orders for this visit:    Medicare annual wellness visit, subsequent    Hypertension, unspecified type    Hypothyroidism, unspecified type    Obesity, morbid ([facility] [address] )    Overactive bladder          Subjective:      Patient ID: Tasia Acevedo is a 80 y o  female  Does not need rx  Denies cp/sob/h/a      The following portions of the patient's history were reviewed and updated as appropriate: She  has a past medical history of Seasonal allergies  She   Patient Active Problem List    Diagnosis Date Noted    Obesity, morbid ([facility] [address] ) 11/17/2020    Seasonal allergies 05/08/2019    Primary osteoarthritis of right knee 06/08/2018    Chronic pain of right knee 06/08/2018    Hypertension 01/24/2018    Hypothyroidism 01/24/2018    Overactive bladder 01/24/2018    Palpitations 01/24/2018    EKG, abnormal 01/24/2018     She  has a past surgical history that includes Gallbladder surgery; Appendectomy; Ankle surgery; Cholecystectomy; Incision tendon sheath hand; Neuroplasty / transposition median nerve at carpal tunnel; Tubal ligation; Oophorectomy (Bilateral); and Hysterectomy (Bilateral)  Her family history includes Breast cancer (age of onset: [address] 83) in her paternal aunt; Breast cancer (age of onset: 62) in her paternal aunt; Heart disease in her father and mother; Kidney cancer in her paternal grandmother; No Known Problems in her daughter, maternal aunt, maternal aunt, maternal grandfather, maternal grandmother, paternal aunt, paternal aunt, paternal aunt, paternal grandfather, sister, sister, son, son, and son; Prostate cancer (age of onset: 80) in her father  She  reports that she has never smoked  She has never used smokeless tobacco  She reports current alcohol use  She reports that she does not use drugs    Current Outpatient Medications   Medication Sig Dispense Refill    Aspirin (ASPIR-81 PO) Aspir-81 81 MG Oral Tablet Delayed Release  TAKE 1 TABLET (81 MG TOTAL) BY MOUTH DAILY  Refills: 0       Filomena Haywarded DO;  Started 27-May-2016  Active      Biotin 1 MG CAPS Biotin 1 MG Oral Capsule   Refills: 0       Filomena Suárez DO;  Started 27-May-2016  Active      Coral Calcium 1000 (390 Ca) MG TABS Coral Calcium 500 MG Oral Tablet   Refills: 0       Filomena Suárez DO;  Started 27-May-2016  Active      docusate sodium (COLACE) 100 mg capsule Take by mouth 3 (three) times a day      doxylamine (UNISON) 25 MG tablet Sleep Aid 25 MG Oral Tablet   Refills: 0       Filomena Abalexandro DO;  Started 27-May-2016  Active      Famotidine (PEPCID PO) Take 20 mg by mouth 2 (two) times a day        levothyroxine 125 mcg tablet TAKE 1 TABLET DAILY 90 tablet 1    losartan (COZAAR) 50 mg tablet Take 1 tablet (50 mg total) by mouth daily 90 tablet 1    metoprolol succinate (TOPROL-XL) 50 mg 24 hr tablet TAKE 1 TABLET DAILY 90 tablet 3    montelukast (SINGULAIR) 10 mg tablet Take 1 tablet (10 mg total) by mouth daily 90 tablet 1    Multiple Vitamins-Minerals (MULTIVITAMIN ADULT PO) Take by mouth      Omega-3 1000 MG CAPS Take by mouth      oxybutynin (DITROPAN-XL) 10 MG 24 hr tablet TAKE 1 TABLET DAILY AT BEDTIME 90 tablet 1    Psyllium (METAMUCIL FIBER PO) Take by mouth      Red Yeast Rice 600 MG CAPS Take by mouth       No current facility-administered medications for this visit  Current Outpatient Medications on File Prior to Visit   Medication Sig    Aspirin (ASPIR-81 PO) Aspir-81 81 MG Oral Tablet Delayed Release  TAKE 1 TABLET (81 MG TOTAL) BY MOUTH DAILY     Refills: 0       Filomena Jose Armando DO;  Started 27-May-2016  Active    Biotin 1 MG CAPS Biotin 1 MG Oral Capsule   Refills: 0       Filomena Suárez DO;  Started 27-May-2016  Active    Coral Calcium 1000 (390 Ca) MG TABS Coral Calcium 500 MG Oral Tablet   Refills: 0       Filomena Suárez DO;  Started 27-May-2016  Active    docusate sodium (COLACE) 100 mg capsule Take by mouth 3 (three) times a day    doxylamine (UNISON) 25 MG tablet Sleep Aid 25 MG Oral Tablet   Refills: 0       Narinder Nguyen ;  Started 27-May-2016  Active    Famotidine (PEPCID PO) Take 20 mg by mouth 2 (two) times a day      levothyroxine 125 mcg tablet TAKE 1 TABLET DAILY    losartan (COZAAR) 50 mg tablet Take 1 tablet (50 mg total) by mouth daily    metoprolol succinate (TOPROL-XL) 50 mg 24 hr tablet TAKE 1 TABLET DAILY    montelukast (SINGULAIR) 10 mg tablet Take 1 tablet (10 mg total) by mouth daily    Multiple Vitamins-Minerals (MULTIVITAMIN ADULT PO) Take by mouth    Omega-3 1000 MG CAPS Take by mouth    oxybutynin (DITROPAN-XL) 10 MG 24 hr tablet TAKE 1 TABLET DAILY AT BEDTIME    Psyllium (METAMUCIL FIBER PO) Take by mouth    Red Yeast Rice 600 MG CAPS Take by mouth     No current facility-administered medications on file prior to visit  She is allergic to prednisone; vicodin [hydrocodone-acetaminophen]; and other       Review of Systems   Constitutional: Negative  HENT: Negative  Respiratory: Negative  Negative for shortness of breath  Cardiovascular: Negative  Negative for chest pain  Gastrointestinal: Negative  Neurological: Negative for headaches  Objective:      /74   Pulse 75   Temp 97 6 °F (36 4 °C)   Resp 18   Ht 5' 5" (1 651 m)   Wt 103 kg (228 lb)   SpO2 98%   BMI 37 94 kg/m²          Physical Exam  Constitutional:       Appearance: She is obese  HENT:      Head: Normocephalic and atraumatic  Right Ear: Tympanic membrane and ear canal normal       Left Ear: Tympanic membrane and ear canal normal       Mouth/Throat:      Pharynx: No posterior oropharyngeal erythema  Neck:      Musculoskeletal: Neck supple  Cardiovascular:      Rate and Rhythm: Normal rate and regular rhythm  Pulmonary:      Effort: Pulmonary effort is normal       Breath sounds: Normal breath sounds  Lymphadenopathy:      Cervical: No cervical adenopathy  Neurological:      Mental Status: She is alert

## 2021-06-10 DIAGNOSIS — I10 ESSENTIAL HYPERTENSION: ICD-10-CM

## 2021-06-11 RX ORDER — METOPROLOL SUCCINATE 50 MG/1
50 TABLET, EXTENDED RELEASE ORAL DAILY
Qty: 90 TABLET | Refills: 1 | Status: SHIPPED | OUTPATIENT
Start: 2021-06-11 | End: 2021-12-14

## 2021-06-15 DIAGNOSIS — N32.81 OVERACTIVE BLADDER: ICD-10-CM

## 2021-06-15 RX ORDER — OXYBUTYNIN CHLORIDE 10 MG/1
10 TABLET, EXTENDED RELEASE ORAL
Qty: 90 TABLET | Refills: 1 | Status: SHIPPED | OUTPATIENT
Start: 2021-06-15 | End: 2021-12-14

## 2021-07-14 ENCOUNTER — APPOINTMENT (OUTPATIENT)
Dept: LAB | Facility: MEDICAL CENTER | Age: 81
End: 2021-07-14
Payer: COMMERCIAL

## 2021-07-14 ENCOUNTER — OFFICE VISIT (OUTPATIENT)
Dept: FAMILY MEDICINE CLINIC | Facility: CLINIC | Age: 81
End: 2021-07-14
Payer: COMMERCIAL

## 2021-07-14 VITALS
TEMPERATURE: 97.9 F | DIASTOLIC BLOOD PRESSURE: 78 MMHG | HEART RATE: 64 BPM | BODY MASS INDEX: 38.32 KG/M2 | RESPIRATION RATE: 18 BRPM | WEIGHT: 230 LBS | SYSTOLIC BLOOD PRESSURE: 120 MMHG | OXYGEN SATURATION: 97 % | HEIGHT: 65 IN

## 2021-07-14 DIAGNOSIS — A79.9: ICD-10-CM

## 2021-07-14 DIAGNOSIS — A79.9: Primary | ICD-10-CM

## 2021-07-14 PROCEDURE — 3078F DIAST BP <80 MM HG: CPT | Performed by: PHYSICIAN ASSISTANT

## 2021-07-14 PROCEDURE — 1036F TOBACCO NON-USER: CPT | Performed by: PHYSICIAN ASSISTANT

## 2021-07-14 PROCEDURE — 86618 LYME DISEASE ANTIBODY: CPT

## 2021-07-14 PROCEDURE — 36415 COLL VENOUS BLD VENIPUNCTURE: CPT

## 2021-07-14 PROCEDURE — 1160F RVW MEDS BY RX/DR IN RCRD: CPT | Performed by: PHYSICIAN ASSISTANT

## 2021-07-14 PROCEDURE — 3074F SYST BP LT 130 MM HG: CPT | Performed by: PHYSICIAN ASSISTANT

## 2021-07-14 PROCEDURE — 99214 OFFICE O/P EST MOD 30 MIN: CPT | Performed by: PHYSICIAN ASSISTANT

## 2021-07-14 RX ORDER — DOXYCYCLINE HYCLATE 100 MG
100 TABLET ORAL 2 TIMES DAILY
Qty: 28 TABLET | Refills: 0 | Status: SHIPPED | OUTPATIENT
Start: 2021-07-14 | End: 2021-07-28

## 2021-07-14 NOTE — PROGRESS NOTES
Assessment/Plan:     Diagnoses and all orders for this visit:    Rickettsial disease  -     doxycycline hyclate (VIBRA-TABS) 100 mg tablet; Take 1 tablet (100 mg total) by mouth 2 (two) times a day for 14 days  -     Lyme Antibody Profile with reflex to WB; Future          Subjective:      Patient ID: Vito Mooney is a 80 y o  female  Presents in the office with a rash that started yesterday on her right arm  And has since spread to her left arm in her anterior chest   Patient state is not very itchy  No new medication or antibiotic  No new topical skin products or laundry soap  No change in food  The rash appears to be very fine raise vesicles  The rash has the appearance of chickenpox  Patient [place]  Her son's dog were present  She also has a resolving right of some time on the left side of her neck  Highly suspicious for rickettsial disease      The following portions of the patient's history were reviewed and updated as appropriate:   She   Patient Active Problem List    Diagnosis Date Noted    Obesity, morbid ([facility] [address] ) 11/17/2020    Seasonal allergies 05/08/2019    Primary osteoarthritis of right knee 06/08/2018    Chronic pain of right knee 06/08/2018    Hypertension 01/24/2018    Hypothyroidism 01/24/2018    Overactive bladder 01/24/2018    Palpitations 01/24/2018    EKG, abnormal 01/24/2018     Current Outpatient Medications   Medication Sig Dispense Refill    Aspirin (ASPIR-81 PO) Aspir-81 81 MG Oral Tablet Delayed Release  TAKE 1 TABLET (81 MG TOTAL) BY MOUTH DAILY     Refills: 0       Nallely Na DO;  Started 27-May-2016  Active      Biotin 1 MG CAPS Biotin 1 MG Oral Capsule   Refills: 0       Nallely Na DO;  Started 27-May-2016  Active      Coral Calcium 1000 (390 Ca) MG TABS Coral Calcium 500 MG Oral Tablet   Refills: 0       [place] Na DO;  Started 27-May-2016  Active      docusate sodium (COLACE) 100 mg capsule Take by mouth 3 (three) times a day      doxylamine (UNISON) 25 MG tablet Sleep Aid 25 MG Oral Tablet   Refills: 0       Joaquin Spine DO;  Started 27-May-2016  Active      Famotidine (PEPCID PO) Take 20 mg by mouth 2 (two) times a day        levothyroxine 125 mcg tablet TAKE 1 TABLET DAILY 90 tablet 1    losartan (COZAAR) 50 mg tablet Take 1 tablet (50 mg total) by mouth daily 90 tablet 1    metoprolol succinate (TOPROL-XL) 50 mg 24 hr tablet Take 1 tablet (50 mg total) by mouth daily Hold for heart rate less than 50 beats per minute  90 tablet 1    montelukast (SINGULAIR) 10 mg tablet Take 1 tablet (10 mg total) by mouth daily 90 tablet 1    Multiple Vitamins-Minerals (MULTIVITAMIN ADULT PO) Take by mouth      Omega-3 1000 MG CAPS Take by mouth      oxybutynin (DITROPAN-XL) 10 MG 24 hr tablet Take 1 tablet (10 mg total) by mouth daily at bedtime 90 tablet 1    Psyllium (METAMUCIL FIBER PO) Take by mouth      Red Yeast Rice 600 MG CAPS Take by mouth      doxycycline hyclate (VIBRA-TABS) 100 mg tablet Take 1 tablet (100 mg total) by mouth 2 (two) times a day for 14 days 28 tablet 0     No current facility-administered medications for this visit  She is allergic to prednisone, vicodin [hydrocodone-acetaminophen], and other       Review of Systems   Constitutional: Negative for fever  HENT: Positive for congestion and postnasal drip  Negative for rhinorrhea, sinus pressure and sinus pain  Respiratory: Positive for cough  Negative for shortness of breath  Objective:        Physical Exam  Vitals and nursing note reviewed  Constitutional:       General: She is not in acute distress  Appearance: She is obese  She is not ill-appearing or diaphoretic  HENT:      Head: Normocephalic and atraumatic        Right Ear: Tympanic membrane, ear canal and external ear normal       Left Ear: Tympanic membrane, ear canal and external ear normal       Nose:      Right Sinus: No maxillary sinus tenderness or frontal sinus tenderness  Left Sinus: No maxillary sinus tenderness or frontal sinus tenderness  Mouth/Throat:      Pharynx: No oropharyngeal exudate or posterior oropharyngeal erythema  Eyes:      Conjunctiva/sclera: Conjunctivae normal       Pupils: Pupils are equal, round, and reactive to light  Neck:      Thyroid: No thyromegaly  Vascular: No carotid bruit  Cardiovascular:      Rate and Rhythm: Normal rate and regular rhythm  Heart sounds: No murmur heard  No friction rub  No gallop  Pulmonary:      Effort: Pulmonary effort is normal  No respiratory distress  Breath sounds: Normal breath sounds  No wheezing  Lymphadenopathy:      Cervical: No cervical adenopathy  Skin:     General: Skin is warm and dry  Findings: No erythema or rash  Comments: Patient has very tiny 1 mm to 2 mm red raise vesicles  Patient also has a resolving bite on the left upper side of her neck  I had suspicious for check foreign rash  Patient was in [place] on vacation and her son's dogs were there as well  Patient did not find to tick  Neurological:      Mental Status: She is alert and oriented to person, place, and time  Psychiatric:         Mood and Affect: Mood normal          Behavior: Behavior normal          Thought Content:  Thought content normal          Judgment: Judgment normal

## 2021-07-15 ENCOUNTER — TELEPHONE (OUTPATIENT)
Dept: FAMILY MEDICINE CLINIC | Facility: CLINIC | Age: 81
End: 2021-07-15

## 2021-07-15 LAB — B BURGDOR IGG+IGM SER-ACNC: 14

## 2021-07-15 NOTE — TELEPHONE ENCOUNTER
Patient called in- she saw lymes results on mychart were negative  Asking if she should continue antibiotic/ what next steps are  Please advise       PERLITA#[phone]

## 2021-08-02 ENCOUNTER — OFFICE VISIT (OUTPATIENT)
Dept: FAMILY MEDICINE CLINIC | Facility: CLINIC | Age: 81
End: 2021-08-02
Payer: COMMERCIAL

## 2021-08-02 VITALS
HEART RATE: 75 BPM | TEMPERATURE: 97.7 F | BODY MASS INDEX: 37.32 KG/M2 | HEIGHT: 65 IN | DIASTOLIC BLOOD PRESSURE: 78 MMHG | WEIGHT: 224 LBS | RESPIRATION RATE: 16 BRPM | OXYGEN SATURATION: 95 % | SYSTOLIC BLOOD PRESSURE: 126 MMHG

## 2021-08-02 DIAGNOSIS — J32.9 SINUSITIS, UNSPECIFIED CHRONICITY, UNSPECIFIED LOCATION: Primary | ICD-10-CM

## 2021-08-02 PROCEDURE — 3074F SYST BP LT 130 MM HG: CPT | Performed by: INTERNAL MEDICINE

## 2021-08-02 PROCEDURE — 1036F TOBACCO NON-USER: CPT | Performed by: INTERNAL MEDICINE

## 2021-08-02 PROCEDURE — 3078F DIAST BP <80 MM HG: CPT | Performed by: INTERNAL MEDICINE

## 2021-08-02 PROCEDURE — 3725F SCREEN DEPRESSION PERFORMED: CPT | Performed by: INTERNAL MEDICINE

## 2021-08-02 PROCEDURE — 1160F RVW MEDS BY RX/DR IN RCRD: CPT | Performed by: INTERNAL MEDICINE

## 2021-08-02 PROCEDURE — 99213 OFFICE O/P EST LOW 20 MIN: CPT | Performed by: INTERNAL MEDICINE

## 2021-08-02 RX ORDER — AMOXICILLIN AND CLAVULANATE POTASSIUM 875; 125 MG/1; MG/1
1 TABLET, FILM COATED ORAL EVERY 12 HOURS SCHEDULED
Qty: 20 TABLET | Refills: 0 | Status: SHIPPED | OUTPATIENT
Start: 2021-08-02 | End: 2021-08-12

## 2021-08-02 NOTE — PROGRESS NOTES
Assessment/Plan:         Diagnoses and all orders for this visit:    Sinusitis, unspecified chronicity, unspecified location  Comments:  augmentin  Orders:  -     amoxicillin-clavulanate (Augmentin) 875-125 mg per tablet; Take 1 tablet by mouth every 12 (twelve) hours for 10 days          Subjective:      Patient ID: Hamzah Short is a 80 y o  female  Pt became ill Saturday am   +hoarse +feels it is her sinus  Now dripping into her chest +pressure behind her eyes  Denies f/s/c   preceeding this 2 weeks earlier was tx'd for richettsial infection  ? Tick bite  +head congestion  +post nasal drip +cough      The following portions of the patient's history were reviewed and updated as appropriate: She  has a past medical history of Seasonal allergies  She   Patient Active Problem List    Diagnosis Date Noted    Obesity, morbid ([facility] [address] ) 11/17/2020    Seasonal allergies 05/08/2019    Primary osteoarthritis of right knee 06/08/2018    Chronic pain of right knee 06/08/2018    Hypertension 01/24/2018    Hypothyroidism 01/24/2018    Overactive bladder 01/24/2018    Palpitations 01/24/2018    EKG, abnormal 01/24/2018     She  has a past surgical history that includes Gallbladder surgery; Appendectomy; Ankle surgery; Cholecystectomy; Incision tendon sheath hand; Neuroplasty / transposition median nerve at carpal tunnel; Tubal ligation; Oophorectomy (Bilateral); and Hysterectomy (Bilateral)  Her family history includes Breast cancer (age of onset: 64) in her paternal aunt; Breast cancer (age of onset: 62) in her paternal aunt; Heart disease in her father and mother; Kidney cancer in her paternal grandmother; No Known Problems in her daughter, maternal aunt, maternal aunt, maternal grandfather, maternal grandmother, paternal aunt, paternal aunt, paternal aunt, paternal grandfather, sister, sister, son, son, and son; Prostate cancer (age of onset: 80) in her father  She  reports that she has never smoked   She has never used smokeless tobacco  She reports current alcohol use  She reports that she does not use drugs  Current Outpatient Medications   Medication Sig Dispense Refill    Aspirin (ASPIR-81 PO) Aspir-81 81 MG Oral Tablet Delayed Release  TAKE 1 TABLET (81 MG TOTAL) BY MOUTH DAILY  Refills: 0       Eric Ramos DO;  Started 27-May-2016  Active      Biotin 1 MG CAPS Biotin 1 MG Oral Capsule   Refills: 0       Eric Ramos DO;  Started 27-May-2016  Active      Coral Calcium 1000 (390 Ca) MG TABS Coral Calcium 500 MG Oral Tablet   Refills: 0       Eric Ramos DO;  Started 27-May-2016  Active      docusate sodium (COLACE) 100 mg capsule Take by mouth 3 (three) times a day      doxylamine (UNISON) 25 MG tablet Sleep Aid 25 MG Oral Tablet   Refills: 0       Eric Ramos DO;  Started 27-May-2016  Active      Famotidine (PEPCID PO) Take 20 mg by mouth 2 (two) times a day        levothyroxine 125 mcg tablet Take 1 tablet (125 mcg total) by mouth daily 90 tablet 1    losartan (COZAAR) 50 mg tablet Take 1 tablet (50 mg total) by mouth daily 90 tablet 1    metoprolol succinate (TOPROL-XL) 50 mg 24 hr tablet Take 1 tablet (50 mg total) by mouth daily Hold for heart rate less than 50 beats per minute  90 tablet 1    montelukast (SINGULAIR) 10 mg tablet Take 1 tablet (10 mg total) by mouth daily 90 tablet 1    Multiple Vitamins-Minerals (MULTIVITAMIN ADULT PO) Take by mouth      Omega-3 1000 MG CAPS Take by mouth      oxybutynin (DITROPAN-XL) 10 MG 24 hr tablet Take 1 tablet (10 mg total) by mouth daily at bedtime 90 tablet 1    Psyllium (METAMUCIL FIBER PO) Take by mouth      Red Yeast Rice 600 MG CAPS Take by mouth      amoxicillin-clavulanate (Augmentin) 875-125 mg per tablet Take 1 tablet by mouth every 12 (twelve) hours for 10 days 20 tablet 0     No current facility-administered medications for this visit       Current Outpatient Medications on File Prior to Visit   Medication Sig    Aspirin (ASPIR-81 PO) Aspir-81 81 MG Oral Tablet Delayed Release  TAKE 1 TABLET (81 MG TOTAL) BY MOUTH DAILY  Refills: 0       Oneida Odalys DO;  Started 27-May-2016  Active    Biotin 1 MG CAPS Biotin 1 MG Oral Capsule   Refills: 0       Oneida Odalys DO;  Started 27-May-2016  Active    Coral Calcium 1000 (390 Ca) MG TABS Coral Calcium 500 MG Oral Tablet   Refills: 0       Oneida Odalys DO;  Started 27-May-2016  Active    docusate sodium (COLACE) 100 mg capsule Take by mouth 3 (three) times a day    doxylamine (UNISON) 25 MG tablet Sleep Aid 25 MG Oral Tablet   Refills: 0       Oneida Odalys DO;  Started 27-May-2016  Active    Famotidine (PEPCID PO) Take 20 mg by mouth 2 (two) times a day      levothyroxine 125 mcg tablet Take 1 tablet (125 mcg total) by mouth daily    losartan (COZAAR) 50 mg tablet Take 1 tablet (50 mg total) by mouth daily    metoprolol succinate (TOPROL-XL) 50 mg 24 hr tablet Take 1 tablet (50 mg total) by mouth daily Hold for heart rate less than 50 beats per minute   montelukast (SINGULAIR) 10 mg tablet Take 1 tablet (10 mg total) by mouth daily    Multiple Vitamins-Minerals (MULTIVITAMIN ADULT PO) Take by mouth    Omega-3 1000 MG CAPS Take by mouth    oxybutynin (DITROPAN-XL) 10 MG 24 hr tablet Take 1 tablet (10 mg total) by mouth daily at bedtime    Psyllium (METAMUCIL FIBER PO) Take by mouth    Red Yeast Rice 600 MG CAPS Take by mouth     No current facility-administered medications on file prior to visit  She is allergic to prednisone, vicodin [hydrocodone-acetaminophen], and other       Review of Systems   Constitutional: Negative  Negative for chills and fever  HENT:        Pressure behind eyes  Eyes: Negative  Respiratory: Positive for cough  Cardiovascular: Negative            Objective:      /78 (BP Location: Right arm, Patient Position: Sitting, Cuff Size: Large)   Pulse 75   Temp 97 7 °F (36 5 °C) (Temporal)   Resp 16   Ht 5' 5" (1 651 m)   Wt 102 kg (224 lb)   SpO2 95%   BMI 37 28 kg/m²          Physical Exam  Constitutional:       Appearance: She is obese  HENT:      Head: Normocephalic and atraumatic  Right Ear: Tympanic membrane and ear canal normal       Left Ear: Tympanic membrane and ear canal normal    Cardiovascular:      Rate and Rhythm: Normal rate and regular rhythm  Pulmonary:      Effort: Pulmonary effort is normal       Breath sounds: Normal breath sounds  Musculoskeletal:      Cervical back: Neck supple  Lymphadenopathy:      Cervical: No cervical adenopathy  Neurological:      Mental Status: She is alert

## 2021-08-27 DIAGNOSIS — T78.40XD ALLERGIC DISORDER, SUBSEQUENT ENCOUNTER: ICD-10-CM

## 2021-08-27 RX ORDER — MONTELUKAST SODIUM 10 MG/1
TABLET ORAL
Qty: 90 TABLET | Refills: 1 | Status: SHIPPED | OUTPATIENT
Start: 2021-08-27 | End: 2022-02-07

## 2021-09-27 ENCOUNTER — VBI (OUTPATIENT)
Dept: ADMINISTRATIVE | Facility: OTHER | Age: 81
End: 2021-09-27

## 2021-10-22 ENCOUNTER — OFFICE VISIT (OUTPATIENT)
Dept: OBGYN CLINIC | Facility: MEDICAL CENTER | Age: 81
End: 2021-10-22
Payer: COMMERCIAL

## 2021-10-22 ENCOUNTER — APPOINTMENT (OUTPATIENT)
Dept: RADIOLOGY | Facility: MEDICAL CENTER | Age: 81
End: 2021-10-22
Payer: COMMERCIAL

## 2021-10-22 VITALS
SYSTOLIC BLOOD PRESSURE: 155 MMHG | DIASTOLIC BLOOD PRESSURE: 83 MMHG | HEART RATE: 66 BPM | BODY MASS INDEX: 38.42 KG/M2 | HEIGHT: 65 IN | WEIGHT: 230.6 LBS

## 2021-10-22 DIAGNOSIS — M18.11 PRIMARY OSTEOARTHRITIS OF FIRST CARPOMETACARPAL JOINT OF RIGHT HAND: Primary | ICD-10-CM

## 2021-10-22 DIAGNOSIS — G89.29 CHRONIC PAIN OF RIGHT KNEE: ICD-10-CM

## 2021-10-22 DIAGNOSIS — M65.4 DE QUERVAIN'S SYNDROME (TENOSYNOVITIS): ICD-10-CM

## 2021-10-22 DIAGNOSIS — M18.11 PRIMARY OSTEOARTHRITIS OF FIRST CARPOMETACARPAL JOINT OF RIGHT HAND: ICD-10-CM

## 2021-10-22 DIAGNOSIS — M25.561 CHRONIC PAIN OF RIGHT KNEE: ICD-10-CM

## 2021-10-22 DIAGNOSIS — M17.11 PRIMARY OSTEOARTHRITIS OF RIGHT KNEE: ICD-10-CM

## 2021-10-22 PROCEDURE — 20600 DRAIN/INJ JOINT/BURSA W/O US: CPT | Performed by: ORTHOPAEDIC SURGERY

## 2021-10-22 PROCEDURE — 1036F TOBACCO NON-USER: CPT | Performed by: ORTHOPAEDIC SURGERY

## 2021-10-22 PROCEDURE — 73110 X-RAY EXAM OF WRIST: CPT

## 2021-10-22 PROCEDURE — 1160F RVW MEDS BY RX/DR IN RCRD: CPT | Performed by: ORTHOPAEDIC SURGERY

## 2021-10-22 PROCEDURE — 20610 DRAIN/INJ JOINT/BURSA W/O US: CPT | Performed by: ORTHOPAEDIC SURGERY

## 2021-10-22 PROCEDURE — 99214 OFFICE O/P EST MOD 30 MIN: CPT | Performed by: ORTHOPAEDIC SURGERY

## 2021-10-22 PROCEDURE — 3079F DIAST BP 80-89 MM HG: CPT | Performed by: ORTHOPAEDIC SURGERY

## 2021-10-22 PROCEDURE — 3077F SYST BP >= 140 MM HG: CPT | Performed by: ORTHOPAEDIC SURGERY

## 2021-10-22 RX ORDER — BUPIVACAINE HYDROCHLORIDE 2.5 MG/ML
1 INJECTION, SOLUTION EPIDURAL; INFILTRATION; INTRACAUDAL
Status: COMPLETED | OUTPATIENT
Start: 2021-10-22 | End: 2021-10-22

## 2021-10-22 RX ORDER — LIDOCAINE HYDROCHLORIDE 10 MG/ML
2 INJECTION, SOLUTION INFILTRATION; PERINEURAL
Status: COMPLETED | OUTPATIENT
Start: 2021-10-22 | End: 2021-10-22

## 2021-10-22 RX ORDER — BUPIVACAINE HYDROCHLORIDE 2.5 MG/ML
2 INJECTION, SOLUTION INFILTRATION; PERINEURAL
Status: COMPLETED | OUTPATIENT
Start: 2021-10-22 | End: 2021-10-22

## 2021-10-22 RX ORDER — BETAMETHASONE SODIUM PHOSPHATE AND BETAMETHASONE ACETATE 3; 3 MG/ML; MG/ML
3 INJECTION, SUSPENSION INTRA-ARTICULAR; INTRALESIONAL; INTRAMUSCULAR; SOFT TISSUE
Status: COMPLETED | OUTPATIENT
Start: 2021-10-22 | End: 2021-10-22

## 2021-10-22 RX ORDER — BETAMETHASONE SODIUM PHOSPHATE AND BETAMETHASONE ACETATE 3; 3 MG/ML; MG/ML
6 INJECTION, SUSPENSION INTRA-ARTICULAR; INTRALESIONAL; INTRAMUSCULAR; SOFT TISSUE
Status: COMPLETED | OUTPATIENT
Start: 2021-10-22 | End: 2021-10-22

## 2021-10-22 RX ORDER — BETAMETHASONE SODIUM PHOSPHATE AND BETAMETHASONE ACETATE 3; 3 MG/ML; MG/ML
12 INJECTION, SUSPENSION INTRA-ARTICULAR; INTRALESIONAL; INTRAMUSCULAR; SOFT TISSUE
Status: COMPLETED | OUTPATIENT
Start: 2021-10-22 | End: 2021-10-22

## 2021-10-22 RX ORDER — BUPIVACAINE HYDROCHLORIDE 2.5 MG/ML
0.5 INJECTION, SOLUTION INFILTRATION; PERINEURAL
Status: COMPLETED | OUTPATIENT
Start: 2021-10-22 | End: 2021-10-22

## 2021-10-22 RX ORDER — LIDOCAINE HYDROCHLORIDE 10 MG/ML
1 INJECTION, SOLUTION INFILTRATION; PERINEURAL
Status: COMPLETED | OUTPATIENT
Start: 2021-10-22 | End: 2021-10-22

## 2021-10-22 RX ADMIN — BETAMETHASONE SODIUM PHOSPHATE AND BETAMETHASONE ACETATE 3 MG: 3; 3 INJECTION, SUSPENSION INTRA-ARTICULAR; INTRALESIONAL; INTRAMUSCULAR; SOFT TISSUE at 15:30

## 2021-10-22 RX ADMIN — LIDOCAINE HYDROCHLORIDE 1 ML: 10 INJECTION, SOLUTION INFILTRATION; PERINEURAL at 15:30

## 2021-10-22 RX ADMIN — BUPIVACAINE HYDROCHLORIDE 2 ML: 2.5 INJECTION, SOLUTION INFILTRATION; PERINEURAL at 15:30

## 2021-10-22 RX ADMIN — BETAMETHASONE SODIUM PHOSPHATE AND BETAMETHASONE ACETATE 6 MG: 3; 3 INJECTION, SUSPENSION INTRA-ARTICULAR; INTRALESIONAL; INTRAMUSCULAR; SOFT TISSUE at 15:30

## 2021-10-22 RX ADMIN — LIDOCAINE HYDROCHLORIDE 2 ML: 10 INJECTION, SOLUTION INFILTRATION; PERINEURAL at 15:30

## 2021-10-22 RX ADMIN — BUPIVACAINE HYDROCHLORIDE 1 ML: 2.5 INJECTION, SOLUTION EPIDURAL; INFILTRATION; INTRACAUDAL at 15:30

## 2021-10-22 RX ADMIN — BETAMETHASONE SODIUM PHOSPHATE AND BETAMETHASONE ACETATE 12 MG: 3; 3 INJECTION, SUSPENSION INTRA-ARTICULAR; INTRALESIONAL; INTRAMUSCULAR; SOFT TISSUE at 15:30

## 2021-10-22 RX ADMIN — BUPIVACAINE HYDROCHLORIDE 0.5 ML: 2.5 INJECTION, SOLUTION INFILTRATION; PERINEURAL at 15:30

## 2021-11-09 ENCOUNTER — HOSPITAL ENCOUNTER (OUTPATIENT)
Dept: RADIOLOGY | Age: 81
Discharge: HOME | End: 2021-11-09
Payer: COMMERCIAL

## 2021-11-09 VITALS — WEIGHT: 230 LBS | HEIGHT: 65 IN | BODY MASS INDEX: 38.32 KG/M2

## 2021-11-09 DIAGNOSIS — Z12.31 ENCOUNTER FOR SCREENING MAMMOGRAM FOR MALIGNANT NEOPLASM OF BREAST: ICD-10-CM

## 2021-11-09 DIAGNOSIS — M85.80 OSTEOPENIA, UNSPECIFIED LOCATION: ICD-10-CM

## 2021-11-09 PROCEDURE — 77067 SCR MAMMO BI INCL CAD: CPT

## 2021-11-09 PROCEDURE — 77080 DXA BONE DENSITY AXIAL: CPT

## 2021-11-09 PROCEDURE — 77063 BREAST TOMOSYNTHESIS BI: CPT

## 2021-11-17 ENCOUNTER — RA CDI HCC (OUTPATIENT)
Dept: OTHER | Facility: HOSPITAL | Age: 81
End: 2021-11-17

## 2021-11-24 ENCOUNTER — OFFICE VISIT (OUTPATIENT)
Dept: FAMILY MEDICINE CLINIC | Facility: CLINIC | Age: 81
End: 2021-11-24
Payer: COMMERCIAL

## 2021-11-24 VITALS
HEIGHT: 65 IN | BODY MASS INDEX: 37.82 KG/M2 | TEMPERATURE: 97.1 F | WEIGHT: 227 LBS | HEART RATE: 68 BPM | SYSTOLIC BLOOD PRESSURE: 136 MMHG | OXYGEN SATURATION: 96 % | RESPIRATION RATE: 16 BRPM | DIASTOLIC BLOOD PRESSURE: 70 MMHG

## 2021-11-24 DIAGNOSIS — N18.30 STAGE 3 CHRONIC KIDNEY DISEASE, UNSPECIFIED WHETHER STAGE 3A OR 3B CKD (HCC): ICD-10-CM

## 2021-11-24 DIAGNOSIS — E03.9 HYPOTHYROIDISM, UNSPECIFIED TYPE: ICD-10-CM

## 2021-11-24 DIAGNOSIS — I10 HYPERTENSION, UNSPECIFIED TYPE: Primary | ICD-10-CM

## 2021-11-24 DIAGNOSIS — N32.81 OVERACTIVE BLADDER: ICD-10-CM

## 2021-11-24 PROCEDURE — 3725F SCREEN DEPRESSION PERFORMED: CPT | Performed by: INTERNAL MEDICINE

## 2021-11-24 PROCEDURE — 99214 OFFICE O/P EST MOD 30 MIN: CPT | Performed by: INTERNAL MEDICINE

## 2021-11-24 PROCEDURE — 3078F DIAST BP <80 MM HG: CPT | Performed by: INTERNAL MEDICINE

## 2021-11-24 PROCEDURE — 3075F SYST BP GE 130 - 139MM HG: CPT | Performed by: INTERNAL MEDICINE

## 2021-11-24 PROCEDURE — 1160F RVW MEDS BY RX/DR IN RCRD: CPT | Performed by: INTERNAL MEDICINE

## 2021-11-24 PROCEDURE — 1036F TOBACCO NON-USER: CPT | Performed by: INTERNAL MEDICINE

## 2021-11-24 PROCEDURE — 3288F FALL RISK ASSESSMENT DOCD: CPT | Performed by: INTERNAL MEDICINE

## 2021-11-24 PROCEDURE — 1101F PT FALLS ASSESS-DOCD LE1/YR: CPT | Performed by: INTERNAL MEDICINE

## 2021-11-24 RX ORDER — MELOXICAM 15 MG/1
15 TABLET ORAL DAILY PRN
Qty: 30 TABLET | Refills: 1 | Status: SHIPPED | OUTPATIENT
Start: 2021-11-24 | End: 2022-06-01 | Stop reason: SDUPTHER

## 2021-12-13 ENCOUNTER — APPOINTMENT (OUTPATIENT)
Dept: LAB | Age: 81
End: 2021-12-13
Payer: COMMERCIAL

## 2021-12-13 DIAGNOSIS — E03.9 HYPOTHYROIDISM, UNSPECIFIED TYPE: ICD-10-CM

## 2021-12-13 LAB — TSH SERPL DL<=0.05 MIU/L-ACNC: 2.03 UIU/ML (ref 0.36–3.74)

## 2021-12-13 PROCEDURE — 84443 ASSAY THYROID STIM HORMONE: CPT

## 2021-12-13 PROCEDURE — 36415 COLL VENOUS BLD VENIPUNCTURE: CPT

## 2021-12-14 DIAGNOSIS — I10 ESSENTIAL HYPERTENSION: ICD-10-CM

## 2021-12-14 DIAGNOSIS — N32.81 OVERACTIVE BLADDER: ICD-10-CM

## 2021-12-14 RX ORDER — METOPROLOL SUCCINATE 50 MG/1
TABLET, EXTENDED RELEASE ORAL
Qty: 90 TABLET | Refills: 1 | Status: SHIPPED | OUTPATIENT
Start: 2021-12-14 | End: 2022-06-27

## 2021-12-14 RX ORDER — OXYBUTYNIN CHLORIDE 10 MG/1
TABLET, EXTENDED RELEASE ORAL
Qty: 90 TABLET | Refills: 1 | Status: SHIPPED | OUTPATIENT
Start: 2021-12-14 | End: 2022-06-27

## 2022-01-04 ENCOUNTER — TELEMEDICINE (OUTPATIENT)
Dept: FAMILY MEDICINE CLINIC | Facility: CLINIC | Age: 82
End: 2022-01-04
Payer: COMMERCIAL

## 2022-01-04 DIAGNOSIS — R05.9 COUGH: Primary | ICD-10-CM

## 2022-01-04 PROCEDURE — 99213 OFFICE O/P EST LOW 20 MIN: CPT | Performed by: FAMILY MEDICINE

## 2022-01-04 RX ORDER — BENZONATATE 100 MG/1
100 CAPSULE ORAL 3 TIMES DAILY PRN
Qty: 30 CAPSULE | Refills: 0 | Status: SHIPPED | OUTPATIENT
Start: 2022-01-04 | End: 2022-06-01

## 2022-01-04 NOTE — PROGRESS NOTES
COVID-19 Outpatient Progress Note    Assessment/Plan:    Problem List Items Addressed This Visit        Other    Cough - Primary     Symptom onset: 12/24/25  Date of exposure: n/a  Date of positive test: declined swab    Symptoms includes:  nasal congestion, rhinorrhea and cough    Patient's most concerning symptom is cough  At this time, patient is maintaining adequate oral intake and hydration  After discussion, patient would like prescription rather than over-the-counter medication  Will prescribe Tessalon Perles to be taken up to 3 times a day    Please maintain appropriate social distancing, wear a mask at all public spaces, wash hands frequently and clean surface after use  If you have fever greater than 104° that does not respond to Tylenol, difficulty eating or drinking, difficulty breathing please report to ER for evaluation               Relevant Medications    benzonatate (TESSALON PERLES) 100 mg capsule         Disposition:     I recommended self-quarantine for 14 days and to call back for worsening symptoms or development of shortness of breath  I have spent 10 minutes directly with the patient  Encounter provider Romeo Martinez MD    Provider located at [address] [place] [id number] [phone]    Recent Visits  No visits were found meeting these conditions  Showing recent visits within past 7 days and meeting all other requirements  Today's Visits  Date Type Provider Dept   01/04/22 Telemedicine MD Abdirahman Fitzpatrick   Showing today's visits and meeting all other requirements  Future Appointments  No visits were found meeting these conditions  Showing future appointments within next 150 days and meeting all other requirements     This virtual check-in was done via [employer] and patient was informed that this is not a secure, HIPAA-compliant platform  She agrees to proceed      Patient agrees to participate in a virtual check in via telephone or video visit instead of presenting to the office to address urgent/immediate medical needs  Patient is aware this is a billable service  After connecting through [place], the patient was identified by name and date of birth  Vinnie Quinteros was informed that this was a telemedicine visit and that the exam was being conducted confidentially over secure lines  Vinnie Quinteros acknowledged consent and understanding of privacy and security of the telemedicine visit  I informed the patient that I have reviewed her record in Epic and presented the opportunity for her to ask any questions regarding the visit today  The patient agreed to participate  Verification of patient location:  Patient is located in the following state in which I hold an active license: [place]    Subjective:   Vinnie Quinteros is a 80 y o  female who is concerned about COVID-19  Patient's symptoms include nasal congestion, rhinorrhea and cough  Patient denies fever, chills, fatigue, malaise, sore throat, anosmia, loss of taste, shortness of breath, chest tightness, abdominal pain, nausea, vomiting, diarrhea, myalgias and headaches       Date of symptom onset: 12/24/2021  COVID-19 vaccination status: Fully vaccinated (primary series)    Exposure:   Contact with a person who is under investigation (PUI) for or who is positive for COVID-19 within the last 14 days?: No    Hospitalized recently for fever and/or lower respiratory symptoms?: No      Currently a healthcare worker that is involved in direct patient care?: No      Works in a special setting where the risk of COVID-19 transmission may be high? (this may include long-term care, correctional and intermediate facilities; homeless shelters; assisted-living facilities and group homes ): No      Resident in a special setting where the risk of COVID-19 transmission may be high? (this may include long-term care, correctional and intermediate facilities; homeless shelters; assisted-living facilities and group homes ): No      No results found for: Laura Woody, [address] [facility], [address] [facility], [place], [facility]  Past Medical History:   Diagnosis Date    Seasonal allergies      Past Surgical History:   Procedure Laterality Date    ANKLE SURGERY      x2    APPENDECTOMY      CHOLECYSTECTOMY      laparoscopic    GALLBLADDER SURGERY      HYSTERECTOMY Bilateral     total abdominal with removal of both ovaries, age 39    [address] [place] HAND      of a finger    NEUROPLASTY / TRANSPOSITION MEDIAN NERVE AT CARPAL TUNNEL      OOPHORECTOMY Bilateral     Age 39    TUBAL LIGATION       Current Outpatient Medications   Medication Sig Dispense Refill    Aspirin (ASPIR-81 PO) Aspir-81 81 MG Oral Tablet Delayed Release  TAKE 1 TABLET (81 MG TOTAL) BY MOUTH DAILY     Refills: 0       Druscilla Wendy DO;  Started 27-May-2016  Active      benzonatate (TESSALON PERLES) 100 mg capsule Take 1 capsule (100 mg total) by mouth 3 (three) times a day as needed for cough 30 capsule 0    Biotin 1 MG CAPS Biotin 1 MG Oral Capsule   Refills: 0       Druscilla Wendy DO;  Started 27-May-2016  Active      Cholecalciferol (VITAMIN D3 PO) Take by mouth      Coral Calcium 1000 (390 Ca) MG TABS Coral Calcium 500 MG Oral Tablet   Refills: 0       Druscilla Wendy DO;  Started 27-May-2016  Active      docusate sodium (COLACE) 100 mg capsule Take by mouth 3 (three) times a day      doxylamine (UNISON) 25 MG tablet Sleep Aid 25 MG Oral Tablet   Refills: 0       Druscilla Wendy DO;  Started 27-May-2016  Active      Famotidine (PEPCID PO) Take 20 mg by mouth 2 (two) times a day        levothyroxine 125 mcg tablet Take 1 tablet (125 mcg total) by mouth daily 90 tablet 1    losartan (COZAAR) 50 mg tablet TAKE 1 TABLET DAILY 90 tablet 1    meloxicam (MOBIC) 15 mg tablet Take 1 tablet (15 mg total) by mouth daily as needed for moderate pain 30 tablet 1    metoprolol succinate (TOPROL-XL) 50 mg 24 hr tablet TAKE 1 TABLET DAILY  HOLD FOR HEART RATE LESS THAN 50 BEATS PER MINUTE 90 tablet 1    montelukast (SINGULAIR) 10 mg tablet TAKE 1 TABLET DAILY 90 tablet 1    Multiple Vitamins-Minerals (MULTIVITAMIN ADULT PO) Take by mouth      Omega-3 1000 MG CAPS Take by mouth      oxybutynin (DITROPAN-XL) 10 MG 24 hr tablet TAKE 1 TABLET DAILY AT BEDTIME 90 tablet 1    Psyllium (METAMUCIL FIBER PO) Take by mouth      Red Yeast Rice 600 MG CAPS Take by mouth       No current facility-administered medications for this visit  Allergies   Allergen Reactions    Prednisone Tachycardia    Vicodin [Hydrocodone-Acetaminophen] Lightheadedness    Other Nasal Congestion     seasonal       Review of Systems   Constitutional: Negative for chills, fatigue and fever  HENT: Positive for congestion and rhinorrhea  Negative for sore throat  Respiratory: Positive for cough  Negative for chest tightness and shortness of breath  Gastrointestinal: Negative for abdominal pain, diarrhea, nausea and vomiting  Musculoskeletal: Negative for myalgias  Neurological: Negative for headaches  Objective: There were no vitals filed for this visit  Physical Exam    VIRTUAL VISIT DISCLAIMER    Hao Wilson verbally agrees to participate in [employer]  Pt is aware that [employer] could be limited without vital signs or the ability to perform a full hands-on physical Willis Barlow understands she or the provider may request at any time to terminate the video visit and request the patient to seek care or treatment in person

## 2022-01-04 NOTE — ASSESSMENT & PLAN NOTE
Symptom onset: 12/24/25  Date of exposure: n/a  Date of positive test: declined swab    Symptoms includes:  nasal congestion, rhinorrhea and cough    Patient's most concerning symptom is cough  At this time, patient is maintaining adequate oral intake and hydration  After discussion, patient would like prescription rather than over-the-counter medication  Will prescribe Tessalon Perles to be taken up to 3 times a day    Please maintain appropriate social distancing, wear a mask at all public spaces, wash hands frequently and clean surface after use  If you have fever greater than 104° that does not respond to Tylenol, difficulty eating or drinking, difficulty breathing please report to ER for evaluation

## 2022-01-06 DIAGNOSIS — E03.9 HYPOTHYROIDISM, UNSPECIFIED TYPE: ICD-10-CM

## 2022-01-06 RX ORDER — LEVOTHYROXINE SODIUM 0.12 MG/1
TABLET ORAL
Qty: 90 TABLET | Refills: 1 | Status: SHIPPED | OUTPATIENT
Start: 2022-01-06 | End: 2022-06-27

## 2022-01-17 ENCOUNTER — VBI (OUTPATIENT)
Dept: ADMINISTRATIVE | Facility: OTHER | Age: 82
End: 2022-01-17

## 2022-01-26 ENCOUNTER — OFFICE VISIT (OUTPATIENT)
Dept: OBGYN CLINIC | Facility: HOSPITAL | Age: 82
End: 2022-01-26
Attending: ORTHOPAEDIC SURGERY
Payer: COMMERCIAL

## 2022-01-26 VITALS
HEART RATE: 76 BPM | DIASTOLIC BLOOD PRESSURE: 85 MMHG | HEIGHT: 65 IN | WEIGHT: 229.4 LBS | SYSTOLIC BLOOD PRESSURE: 166 MMHG | BODY MASS INDEX: 38.22 KG/M2

## 2022-01-26 DIAGNOSIS — M18.11 PRIMARY OSTEOARTHRITIS OF FIRST CARPOMETACARPAL JOINT OF RIGHT HAND: ICD-10-CM

## 2022-01-26 DIAGNOSIS — M65.4 DE QUERVAIN'S SYNDROME (TENOSYNOVITIS): ICD-10-CM

## 2022-01-26 DIAGNOSIS — E66.01 OBESITY, MORBID (HCC): ICD-10-CM

## 2022-01-26 PROCEDURE — 1036F TOBACCO NON-USER: CPT | Performed by: ORTHOPAEDIC SURGERY

## 2022-01-26 PROCEDURE — 99214 OFFICE O/P EST MOD 30 MIN: CPT | Performed by: ORTHOPAEDIC SURGERY

## 2022-01-26 PROCEDURE — 1160F RVW MEDS BY RX/DR IN RCRD: CPT | Performed by: ORTHOPAEDIC SURGERY

## 2022-01-26 RX ORDER — CHLORHEXIDINE GLUCONATE 4 G/100ML
SOLUTION TOPICAL DAILY PRN
OUTPATIENT
Start: 2022-01-26

## 2022-01-26 RX ORDER — CEFAZOLIN SODIUM 2 G/50ML
2000 SOLUTION INTRAVENOUS ONCE
OUTPATIENT
Start: 2022-01-26 | End: 2022-01-26

## 2022-01-26 RX ORDER — CHLORHEXIDINE GLUCONATE 0.12 MG/ML
15 RINSE ORAL ONCE
OUTPATIENT
Start: 2022-01-26 | End: 2022-01-26

## 2022-01-26 NOTE — PROGRESS NOTES
ASSESSMENT/PLAN:    Assessment:   Right thumb dequervain's tenosynovitis  Right thumb CMC arthritis    Plan:   The patient would like to proceed with right thumb interpositional arthroplasty and dequervain's release  Follow up post op    Follow Up:  Post op    To Do Next Visit:  Sutures out    Operative Discussions:  [facility] Arthritis: The anatomy and physiology of carpometacarpal joint arthritis was discussed with the patient today in the office  Deterioration of the articular cartilage eventually leads to hypermobility at the thumb [facility] joint, resulting in joint subluxation, osteophyte formation, cystic changes within the trapezium and base of the first metacarpal, as well as subchondral sclerosis  Eventually, pain, limited mobility, and compensatory hyperextension at the metacarpophalangeal joint may develop  While normal activity and usage of the thumb joint may provide a painful experience to the patient, this typically does not result in damage to the thumb or hand  Treatment options include resting thumb spica splints to decreased joint edema, pain, and inflammation  Therapy exercises to strengthen the thenar musculature may relieve pain, but do not alter the overall continued development of osteoarthritis  Oral medications, topical medications, corticosteroid injections may decrease pain and increase overall function  Eventually, approximately 5% of patients may require surgical intervention  Marthaie Kirk Tenosynovitis: The anatomy and physiology of de Quervain's tenosynovitis was discussed with the patient today in the office  Edema and increased contact pressure within the first dorsal extensor compartment at the radial styloid can cause pain, crepitation, and limitation of function    Treatment options include resting thumb spica splints to decrease edema, oral anti-inflammatory medications, home or formal therapy exercises, up to 2 steroid injections within the first dorsal extensor compartment, or surgical release  While majority of patients do respond to conservative treatment, up to 20% may require surgical release  Discussion was had with the patient regarding risks, benefits and alternatives and the patient has elected to proceed with right dequervain's release and right thumb CMC interpositional arthroplasty    _____________________________________________________  CHIEF COMPLAINT:  Chief Complaint   Patient presents with    Right Thumb - Pain         SUBJECTIVE:  Dl Damon is a 80 y o  female who presents with right thumb cmc arthritis and de quervain's tenosynovitis  She has been treated by Dr Wells Sandifer and was referred to our clinic by Dr Derrick Hutchison  The patient has had 2 prior steroid injections of the right thumb CMC and dequervains  The patient reports that she received 3 months of relief   She also has been treated with a thumb spica brace, tylenol, and activity modification  Radiation: Yes to the  forearm  Previous Treatments: steroid injections, activity modification and bracing with relief  Associated symptoms: None  Handedness: right  Work status: retired, enjoys quilting    PAST MEDICAL HISTORY:  Past Medical History:   Diagnosis Date    Seasonal allergies        PAST SURGICAL HISTORY:  Past Surgical History:   Procedure Laterality Date    ANKLE SURGERY      x2    APPENDECTOMY      CHOLECYSTECTOMY      laparoscopic    GALLBLADDER SURGERY      HYSTERECTOMY Bilateral     total abdominal with removal of both ovaries, age 39    INCISION TENDON SHEATH HAND      of a finger    NEUROPLASTY / TRANSPOSITION MEDIAN NERVE AT CARPAL TUNNEL      OOPHORECTOMY Bilateral     Age 39    TUBAL LIGATION         FAMILY HISTORY:  Family History   Problem Relation Age of Onset    Heart disease Mother         endocarditis    Heart disease Father     Prostate cancer Father 80    No Known Problems Sister     No Known Problems Daughter     No Known Problems Maternal Grandmother     No Known Problems Maternal Grandfather     Kidney cancer Paternal Grandmother 48    No Known Problems Paternal Grandfather     No Known Problems Sister     No Known Problems Son     No Known Problems Son     No Known Problems Son     No Known Problems Maternal Aunt     No Known Problems Maternal Aunt     No Known Problems Paternal Aunt     Breast cancer Paternal Aunt 62    No Known Problems Paternal Aunt     No Known Problems Paternal Aunt     Breast cancer Paternal Aunt 64       SOCIAL HISTORY:  Social History     Tobacco Use    Smoking status: Never Smoker    Smokeless tobacco: Never Used   Vaping Use    Vaping Use: Never used   Substance Use Topics    Alcohol use: Yes     Comment: wine with dinner; occasional use as per [employer]    Drug use: No       MEDICATIONS:    Current Outpatient Medications:     Aspirin (ASPIR-81 PO), Aspir-81 81 MG Oral Tablet Delayed Release TAKE 1 TABLET (81 MG TOTAL) BY MOUTH DAILY    Refills: 0    Sriram Dear DO;  Started 27-May-2016 Active, Disp: , Rfl:     benzonatate (TESSALON PERLES) 100 mg capsule, Take 1 capsule (100 mg total) by mouth 3 (three) times a day as needed for cough, Disp: 30 capsule, Rfl: 0    Biotin 1 MG CAPS, Biotin 1 MG Oral Capsule  Refills: 0    Sriram Dear DO;  Started 27-May-2016 Active, Disp: , Rfl:     Cholecalciferol (VITAMIN D3 PO), Take by mouth, Disp: , Rfl:     Coral Calcium 1000 (390 Ca) MG TABS, Coral Calcium 500 MG Oral Tablet  Refills: 0    Sriram Dear DO;  Started 27-May-2016 Active, Disp: , Rfl:     docusate sodium (COLACE) 100 mg capsule, Take by mouth 3 (three) times a day, Disp: , Rfl:     doxylamine (UNISON) 25 MG tablet, Sleep Aid 25 MG Oral Tablet  Refills: 0    Sriram Dear DO;  Started 27-May-2016 Active, Disp: , Rfl:     Famotidine (PEPCID PO), Take 20 mg by mouth 2 (two) times a day  , Disp: , Rfl:     levothyroxine 125 mcg tablet, TAKE 1 TABLET DAILY, Disp: 90 tablet, Rfl: 1    losartan (COZAAR) 50 mg tablet, TAKE 1 TABLET DAILY, Disp: 90 tablet, Rfl: 1    meloxicam (MOBIC) 15 mg tablet, Take 1 tablet (15 mg total) by mouth daily as needed for moderate pain, Disp: 30 tablet, Rfl: 1    metoprolol succinate (TOPROL-XL) 50 mg 24 hr tablet, TAKE 1 TABLET DAILY  HOLD FOR HEART RATE LESS THAN 50 BEATS PER MINUTE, Disp: 90 tablet, Rfl: 1    montelukast (SINGULAIR) 10 mg tablet, TAKE 1 TABLET DAILY, Disp: 90 tablet, Rfl: 1    Multiple Vitamins-Minerals (MULTIVITAMIN ADULT PO), Take by mouth, Disp: , Rfl:     Omega-3 1000 MG CAPS, Take by mouth, Disp: , Rfl:     oxybutynin (DITROPAN-XL) 10 MG 24 hr tablet, TAKE 1 TABLET DAILY AT BEDTIME, Disp: 90 tablet, Rfl: 1    Psyllium (METAMUCIL FIBER PO), Take by mouth, Disp: , Rfl:     Red Yeast Rice 600 MG CAPS, Take by mouth, Disp: , Rfl:     ALLERGIES:  Allergies   Allergen Reactions    Prednisone Tachycardia    Vicodin [Hydrocodone-Acetaminophen] Lightheadedness    Other Nasal Congestion     seasonal       REVIEW OF SYSTEMS:  Pertinent items are noted in HPI  A comprehensive review of systems was negative  LABS:  HgA1c: No results found for: HGBA1C  BMP:   Lab Results   Component Value Date    CALCIUM 9 9 04/02/2021    K 5 1 04/02/2021    CO2 27 04/02/2021     04/02/2021    BUN 22 04/02/2021    CREATININE 0 93 04/02/2021         _____________________________________________________  PHYSICAL EXAMINATION:  Vital signs: There were no vitals taken for this visit    General: well developed and well nourished, alert, oriented times 3 and appears comfortable  Psychiatric: Normal  HEENT: Trachea Midline, No torticollis  Cardiovascular: No discernable arrhythmia  Pulmonary: No wheezing or stridor  Abdomen: No rebound or guarding  Extremities: No peripheral edema  Skin: No masses, erythema, lacerations, fluctation, ulcerations  Neurovascular: Sensation Intact to the Median, Ulnar, Radial Nerve, Motor Intact to the Median, Ulnar, Radial Nerve and Pulses Intact    MUSCULOSKELETAL EXAMINATION:  Right hand  - Skin intact  - Tender over the thumb CMC  - Pain with grind test  - no ulnar laxity of thumb  - Pain with finklestein's test  - < 2 sec cap refill    _____________________________________________________  STUDIES REVIEWED:  No Studies to review      PROCEDURES PERFORMED:  Procedures  No Procedures performed today       I interviewed, took the history and examined the patient  I discussed the case with the Resident and reviewed the Resident's note  I supervised the Resident and I agree with the Resident management plan as it was presented to me  I ws present in the clinic and examined the patient

## 2022-01-26 NOTE — PATIENT INSTRUCTIONS
What is [place] [employer] Syndrome? Patients with de Quervain syndrome have painful tendons on the thumb side of the wrist  Tendons are the ropes that the muscle uses to pull the bone  You can see them on the back of your hand when you straighten your fingers  In  de Quervain syndrome, the tunnel (the first extensor compartment; see Figure 1A-B) where the tendons run narrows due to the thickening of the soft tissues that make up the tunnel  Hand and thumb motion cause pain, especially with forceful grasping or twisting  Causes  Doctors are not sure what causes de Quervain syndrome  Patients often describe a feeling of inflammation, but studies have shown that the process is not inflammatory  People of all ages get it  When new mothers develop  de Quervain syndrome, it typically appears 4 to 6 weeks after delivery  The old theory that it was caused by wringing out cloth diapers has been replaced by concerns about holding the baby, but changes in hormones and swelling seem more probable  Treatment  Treatments that can relieve symptoms include:   A splint that stops you from moving your thumb and wrist    Tylenol or aspirin type medications (e g , ibuprofen)  Treatments that attempt to change the course of the disease include:   A cortisone-type of steroid injection into the tendon compartment  It has not been clearly established that    these injections change the course of the disease and response to the injection varies   Surgery to open the tunnel and make more room for the tendons  © 2012 American Society for Surgery of the Hand  www handcare  org  What is it [facility] Arth[facility]? In a normal joint, cartilage covers the end of the bones and serves as a shock absorber to allow smooth, pain-free movement  In osteoarthritis (OA, or degenerative arthritis) the cartilage layer wears out, resulting in direct contact between the bones and producing pain and deformity   One of the most common joints to develop OA in the hand is the base of the thumb  The thumb basal joint, also called the carpometacarpal [place]) joint, is a specialized saddle shaped joint that is formed by a small bone of the wrist (trapezium) and the first bone of the thumb (metacarpal)  The saddle shaped joint allows the thumb to have a wide range of motions, including up, down, across the palm, and the ability to pinch (see Figure 1)  Who gets it? OA at the base of the thumb is more commonly seen in women over the age of 36  The exact cause is unknown, but genetics, previous injuries such as fractures or dislocations, and generalized joint laxity may predispose towards development of this type of arthritis  What are the symptoms and signs? The most common symptom is pain at the base of the thumb  The pain can be aggravated by activities that require pinching, such as opening jars, turning door knobs or keys, and writing  Severity can also progress to pain at rest and pain at night  In more severe cases, progressive destruction and mal-alignment of the joint occurs, and a bump develops at the base of the thumb as the metacarpal moves out of the saddle joint  This shift in the joint can cause limited motion and weakness, making pinch difficult (see Figure 2)  The next joint above the [facility] may compensate by loosening, causing it to bend further back (hyperextension)  How is the diagnosis made? The diagnosis is made by history and physical evaluation  Pressure and movement such as twisting will produce pain at the joint  A grinding sensation may also be present at the joint (see Figure 3)  X-rays are used to confirm the diagnosis, although symptom severity often does not correlate with x-ray findings  What are the treatment options? Less severe thumb arthritis will usually respond to non-surgical care  Arthritis medication, splinting and limited cortisone injections may help alleviate pain   A hand therapist might provide a variety of rigid and non-rigid splints which can be used while sleeping or during activities  Patients with advanced disease or who fail non-surgical treatment may be candidates for surgical reconstruction  A variety of surgical techniques are available that can successfully reduce or eliminate pain  Surgical procedures include removal of arthritic bone and joint reconstruction (arthroplasty), joint fusion, bone realignment, and even arthroscopy in select cases  A consultation with your hand surgeon can help decide the best option for you (see Figure 4)  © 2012 American Society for Surgery of the Hand  www handcare  org

## 2022-01-28 ENCOUNTER — OFFICE VISIT (OUTPATIENT)
Dept: OBGYN CLINIC | Facility: HOSPITAL | Age: 82
End: 2022-01-28
Payer: COMMERCIAL

## 2022-01-28 VITALS
DIASTOLIC BLOOD PRESSURE: 78 MMHG | HEIGHT: 65 IN | SYSTOLIC BLOOD PRESSURE: 140 MMHG | HEART RATE: 69 BPM | BODY MASS INDEX: 38.17 KG/M2

## 2022-01-28 DIAGNOSIS — M18.11 PRIMARY OSTEOARTHRITIS OF FIRST CARPOMETACARPAL JOINT OF RIGHT HAND: Primary | ICD-10-CM

## 2022-01-28 PROCEDURE — 20600 DRAIN/INJ JOINT/BURSA W/O US: CPT | Performed by: PHYSICIAN ASSISTANT

## 2022-01-28 PROCEDURE — 99213 OFFICE O/P EST LOW 20 MIN: CPT | Performed by: PHYSICIAN ASSISTANT

## 2022-02-07 RX ADMIN — BETAMETHASONE SODIUM PHOSPHATE AND BETAMETHASONE ACETATE 3 MG: 3; 3 INJECTION, SUSPENSION INTRA-ARTICULAR; INTRALESIONAL; INTRAMUSCULAR; SOFT TISSUE at 12:30

## 2022-02-07 RX ADMIN — LIDOCAINE HYDROCHLORIDE 0.5 ML: 10 INJECTION, SOLUTION INFILTRATION; PERINEURAL at 12:30

## 2022-02-07 NOTE — PROGRESS NOTES
ASSESSMENT/PLAN:    Assessment:   Thumb CMC Arthritis  right    Plan:   steroid injections  - provided injection today as her surgery is > 3 months away and she would like some temporary pain relief    Follow Up: After Surgery    To Do Next Visit:  Sutures out      _____________________________________________________  CHIEF COMPLAINT:  No chief complaint on file  SUBJECTIVE:  Honey Boeck is a 80 y o  female who presents for follow up regarding right thumb CMC arthritis  She was seen on Wednesday and decided to move forward with operative intervention for her arthritis  Patient currently has surgery scheduled for May and would like an injection for temporary pain relief as the surgery is > 3 months from now       PAST MEDICAL HISTORY:  Past Medical History:   Diagnosis Date    Seasonal allergies        PAST SURGICAL HISTORY:  Past Surgical History:   Procedure Laterality Date    ANKLE SURGERY      x2    APPENDECTOMY      CHOLECYSTECTOMY      laparoscopic    GALLBLADDER SURGERY      HYSTERECTOMY Bilateral     total abdominal with removal of both ovaries, age 39    INCISION TENDON SHEATH HAND      of a finger    NEUROPLASTY / TRANSPOSITION MEDIAN NERVE AT CARPAL TUNNEL      OOPHORECTOMY Bilateral     Age 39    TUBAL LIGATION         FAMILY HISTORY:  Family History   Problem Relation Age of Onset    Heart disease Mother         endocarditis    Heart disease Father     Prostate cancer Father 80    No Known Problems Sister     No Known Problems Daughter     No Known Problems Maternal Grandmother     No Known Problems Maternal Grandfather     Kidney cancer Paternal Grandmother 48    No Known Problems Paternal Grandfather     No Known Problems Sister     No Known Problems Son     No Known Problems Son     No Known Problems Son     No Known Problems Maternal Aunt     No Known Problems Maternal Aunt     No Known Problems Paternal Aunt     Breast cancer Paternal Aunt 62    No Known Problems Paternal Aunt     No Known Problems Paternal Aunt     Breast cancer Paternal Aunt 64       SOCIAL HISTORY:  Social History     Tobacco Use    Smoking status: Never Smoker    Smokeless tobacco: Never Used   Vaping Use    Vaping Use: Never used   Substance Use Topics    Alcohol use: Yes     Comment: wine with dinner; occasional use as per [employer]    Drug use: No       MEDICATIONS:    Current Outpatient Medications:     Aspirin (ASPIR-81 PO), Aspir-81 81 MG Oral Tablet Delayed Release TAKE 1 TABLET (81 MG TOTAL) BY MOUTH DAILY  Refills: 0    Ab Riedel DO;  Started 27-May-2016 Active, Disp: , Rfl:     benzonatate (TESSALON PERLES) 100 mg capsule, Take 1 capsule (100 mg total) by mouth 3 (three) times a day as needed for cough, Disp: 30 capsule, Rfl: 0    Biotin 1 MG CAPS, Biotin 1 MG Oral Capsule  Refills: 0    Ab Riedel DO;  Started 27-May-2016 Active, Disp: , Rfl:     Cholecalciferol (VITAMIN D3 PO), Take by mouth, Disp: , Rfl:     Coral Calcium 1000 (390 Ca) MG TABS, Coral Calcium 500 MG Oral Tablet  Refills: 0    Ab Riedel DO;  Started 27-May-2016 Active, Disp: , Rfl:     docusate sodium (COLACE) 100 mg capsule, Take by mouth 3 (three) times a day, Disp: , Rfl:     doxylamine (UNISON) 25 MG tablet, Sleep Aid 25 MG Oral Tablet  Refills: 0    Ab Riedel DO;  Started 27-May-2016 Active, Disp: , Rfl:     Famotidine (PEPCID PO), Take 20 mg by mouth 2 (two) times a day  , Disp: , Rfl:     levothyroxine 125 mcg tablet, TAKE 1 TABLET DAILY, Disp: 90 tablet, Rfl: 1    losartan (COZAAR) 50 mg tablet, TAKE 1 TABLET DAILY, Disp: 90 tablet, Rfl: 1    meloxicam (MOBIC) 15 mg tablet, Take 1 tablet (15 mg total) by mouth daily as needed for moderate pain, Disp: 30 tablet, Rfl: 1    metoprolol succinate (TOPROL-XL) 50 mg 24 hr tablet, TAKE 1 TABLET DAILY   HOLD FOR HEART RATE LESS THAN 50 BEATS PER MINUTE, Disp: 90 tablet, Rfl: 1    montelukast (SINGULAIR) 10 mg tablet, TAKE 1 TABLET DAILY, Disp: 90 tablet, Rfl: 1    Multiple Vitamins-Minerals (MULTIVITAMIN ADULT PO), Take by mouth, Disp: , Rfl:     Omega-3 1000 MG CAPS, Take by mouth, Disp: , Rfl:     oxybutynin (DITROPAN-XL) 10 MG 24 hr tablet, TAKE 1 TABLET DAILY AT BEDTIME, Disp: 90 tablet, Rfl: 1    Psyllium (METAMUCIL FIBER PO), Take by mouth, Disp: , Rfl:     Red Yeast Rice 600 MG CAPS, Take by mouth, Disp: , Rfl:     ALLERGIES:  Allergies   Allergen Reactions    Prednisone Tachycardia    Vicodin [Hydrocodone-Acetaminophen] Lightheadedness    Other Nasal Congestion     seasonal       REVIEW OF SYSTEMS:  Pertinent items are noted in HPI  A comprehensive review of systems was negative  LABS:  HgA1c: No results found for: HGBA1C  BMP:   Lab Results   Component Value Date    CALCIUM 9 9 04/02/2021    K 5 1 04/02/2021    CO2 27 04/02/2021     04/02/2021    BUN 22 04/02/2021    CREATININE 0 93 04/02/2021           _____________________________________________________  PHYSICAL EXAMINATION:  Vital signs: /78   Pulse 69   Ht 5' 5" (1 651 m)   BMI 38 17 kg/m²   General: well developed and well nourished, alert, oriented times 3 and appears comfortable  Psychiatric: Normal  HEENT: Trachea Midline, No torticollis  Cardiovascular: No discernable arrhythmia  Pulmonary: No wheezing or stridor  Abdomen: No rebound or guarding  Extremities: No peripheral edema  Skin: No masses, erythema, lacerations, fluctation, ulcerations  Neurovascular: Sensation Intact to the Median, Ulnar, Radial Nerve, Motor Intact to the Median, Ulnar, Radial Nerve and Pulses Intact    MUSCULOSKELETAL EXAMINATION:  RIGHT SIDE:  CMC: No Instability, Positive grind and Positive tendnerness CMC    _____________________________________________________  STUDIES REVIEWED:  No Studies to review      PROCEDURES PERFORMED:  Small joint arthrocentesis: R thumb CMC  [place] Protocol:  Consent: Verbal consent obtained    Consent given by: patient  Time out: Immediately prior to procedure a "time out" was called to verify the correct patient, procedure, equipment, support staff and site/side marked as required    Supporting Documentation  Indications: pain   Procedure Details  Location: thumb - R thumb CMC  Needle size: 25 G  Medications administered: 0 5 mL lidocaine 1 %; 3 mg betamethasone acetate-betamethasone sodium phosphate 6 (3-3) mg/mL    Patient tolerance: patient tolerated the procedure well with no immediate complications  Dressing:  Sterile dressing applied

## 2022-04-13 DIAGNOSIS — I10 HYPERTENSION, UNSPECIFIED TYPE: ICD-10-CM

## 2022-04-13 RX ORDER — LOSARTAN POTASSIUM 50 MG/1
50 TABLET ORAL DAILY
Qty: 90 TABLET | Refills: 0 | Status: SHIPPED | OUTPATIENT
Start: 2022-04-13 | End: 2022-07-14

## 2022-04-22 ENCOUNTER — OFFICE VISIT (OUTPATIENT)
Dept: OBGYN CLINIC | Facility: MEDICAL CENTER | Age: 82
End: 2022-04-22
Payer: COMMERCIAL

## 2022-04-22 VITALS
HEIGHT: 65 IN | SYSTOLIC BLOOD PRESSURE: 165 MMHG | BODY MASS INDEX: 38.49 KG/M2 | DIASTOLIC BLOOD PRESSURE: 83 MMHG | HEART RATE: 72 BPM | WEIGHT: 231 LBS

## 2022-04-22 DIAGNOSIS — G89.29 CHRONIC PAIN OF RIGHT KNEE: ICD-10-CM

## 2022-04-22 DIAGNOSIS — M17.11 PRIMARY OSTEOARTHRITIS OF RIGHT KNEE: Primary | ICD-10-CM

## 2022-04-22 DIAGNOSIS — M25.561 CHRONIC PAIN OF RIGHT KNEE: ICD-10-CM

## 2022-04-22 PROCEDURE — 20610 DRAIN/INJ JOINT/BURSA W/O US: CPT | Performed by: ORTHOPAEDIC SURGERY

## 2022-04-22 PROCEDURE — 1036F TOBACCO NON-USER: CPT | Performed by: ORTHOPAEDIC SURGERY

## 2022-04-22 PROCEDURE — 99213 OFFICE O/P EST LOW 20 MIN: CPT | Performed by: ORTHOPAEDIC SURGERY

## 2022-04-22 PROCEDURE — 1160F RVW MEDS BY RX/DR IN RCRD: CPT | Performed by: ORTHOPAEDIC SURGERY

## 2022-04-22 PROCEDURE — 3079F DIAST BP 80-89 MM HG: CPT | Performed by: ORTHOPAEDIC SURGERY

## 2022-04-22 PROCEDURE — 3077F SYST BP >= 140 MM HG: CPT | Performed by: ORTHOPAEDIC SURGERY

## 2022-04-22 RX ORDER — BUPIVACAINE HYDROCHLORIDE 2.5 MG/ML
2 INJECTION, SOLUTION INFILTRATION; PERINEURAL
Status: COMPLETED | OUTPATIENT
Start: 2022-04-22 | End: 2022-04-22

## 2022-04-22 RX ORDER — LIDOCAINE HYDROCHLORIDE 10 MG/ML
2 INJECTION, SOLUTION INFILTRATION; PERINEURAL
Status: COMPLETED | OUTPATIENT
Start: 2022-04-22 | End: 2022-04-22

## 2022-04-22 RX ORDER — BETAMETHASONE SODIUM PHOSPHATE AND BETAMETHASONE ACETATE 3; 3 MG/ML; MG/ML
12 INJECTION, SUSPENSION INTRA-ARTICULAR; INTRALESIONAL; INTRAMUSCULAR; SOFT TISSUE
Status: COMPLETED | OUTPATIENT
Start: 2022-04-22 | End: 2022-04-22

## 2022-04-22 RX ADMIN — LIDOCAINE HYDROCHLORIDE 2 ML: 10 INJECTION, SOLUTION INFILTRATION; PERINEURAL at 15:19

## 2022-04-22 RX ADMIN — BUPIVACAINE HYDROCHLORIDE 2 ML: 2.5 INJECTION, SOLUTION INFILTRATION; PERINEURAL at 15:19

## 2022-04-22 RX ADMIN — BETAMETHASONE SODIUM PHOSPHATE AND BETAMETHASONE ACETATE 12 MG: 3; 3 INJECTION, SUSPENSION INTRA-ARTICULAR; INTRALESIONAL; INTRAMUSCULAR; SOFT TISSUE at 15:19

## 2022-04-22 NOTE — PROGRESS NOTES
Assessment:   Diagnosis ICD-10-CM Associated Orders   1  Primary osteoarthritis of right knee  M17 11 Large joint arthrocentesis: R knee   2  Chronic pain of right knee  M25 561 Large joint arthrocentesis: R knee    G89 29        Plan:  Diagnosis, treatment options and associated risks were discussed with the patient including no treatment, nonsurgical treatment and potential for surgical intervention  The patient was given the opportunity to ask questions regarding each  Quality of life decision pursue elective right total knee arthroplasty  Patient states she has some trips coming up and will continue with injection treatment therapy as they provide adequate long-term relief  She was offered, accepted, performed injection of cortisone to her right knee today for symptomatic relief  She tolerated the procedure well  Ice and post injection protocol advised  Weightbearing activities as tolerated  To do next visit:  Return in about 3 months (around 7/22/2022) for re-check  The above stated was discussed in layman's terms and the patient expressed understanding  All questions were answered to the patient's satisfaction  Scribe Attestation    I,:  Tatiana Hughes am acting as a scribe while in the presence of the attending physician :       I,:  Angelita Ling MD personally performed the services described in this documentation    as scribed in my presence :             Subjective:   Jesusita Bellamy is a 80 y o  female who presents today for repeat evaluation of her right knee due to return of pain  She has known and documented osteoarthritis  Her last injection of cortisone was greater than 6 months ago  She is going to the [place] [facility] next month and has family trips coming up over the summer  She has pain laterally that increases with weight-bearing activities        Review of systems negative unless otherwise specified in HPI  Review of Systems    Past Medical History:   Diagnosis Date    Seasonal allergies        Past Surgical History:   Procedure Laterality Date    ANKLE SURGERY      x2    APPENDECTOMY      CHOLECYSTECTOMY      laparoscopic    GALLBLADDER SURGERY      HYSTERECTOMY Bilateral     total abdominal with removal of both ovaries, age 39    INCISION TENDON SHEATH HAND      of a finger    NEUROPLASTY / TRANSPOSITION MEDIAN NERVE AT CARPAL TUNNEL      OOPHORECTOMY Bilateral     Age 39    TUBAL LIGATION         Family History   Problem Relation Age of Onset    Heart disease Mother         endocarditis    Heart disease Father     Prostate cancer Father 80    No Known Problems Sister     No Known Problems Daughter     No Known Problems Maternal Grandmother     No Known Problems Maternal Grandfather     Kidney cancer Paternal Grandmother 48    No Known Problems Paternal Grandfather     No Known Problems Sister     No Known Problems Son     No Known Problems Son     No Known Problems Son     No Known Problems Maternal Aunt     No Known Problems Maternal Aunt     No Known Problems Paternal Aunt     Breast cancer Paternal Aunt 62    No Known Problems Paternal Aunt     No Known Problems Paternal [de-identified]     Breast cancer Paternal Aunt 64       Social History     Occupational History    Not on file   Tobacco Use    Smoking status: Never Smoker    Smokeless tobacco: Never Used   Vaping Use    Vaping Use: Never used   Substance and Sexual Activity    Alcohol use: Yes     Comment: wine with dinner; occasional use as per [employer]    Drug use: No    Sexual activity: Not on file         Current Outpatient Medications:     Aspirin (ASPIR-81 PO), Aspir-81 81 MG Oral Tablet Delayed Release TAKE 1 TABLET (81 MG TOTAL) BY MOUTH DAILY    Refills: 0    Alyssia Severin DO;  Started 27-May-2016 Active, Disp: , Rfl:     Biotin 1 MG CAPS, Biotin 1 MG Oral Capsule  Refills: 0    [place] Severin DO;  Started 27-May-2016 Active, Disp: , Rfl:     Cholecalciferol (VITAMIN D3 PO), Take by mouth, Disp: , Rfl:     Coral Calcium 1000 (390 Ca) MG TABS, Coral Calcium 500 MG Oral Tablet  Refills: 0    Alis Frances DO;  Started 27-May-2016 Active, Disp: , Rfl:     docusate sodium (COLACE) 100 mg capsule, Take by mouth 3 (three) times a day, Disp: , Rfl:     doxylamine (UNISON) 25 MG tablet, Sleep Aid 25 MG Oral Tablet  Refills: 0    Leo Franecs DO;  Started 27-May-2016 Active, Disp: , Rfl:     Famotidine (PEPCID PO), Take 20 mg by mouth 2 (two) times a day  , Disp: , Rfl:     levothyroxine 125 mcg tablet, TAKE 1 TABLET DAILY, Disp: 90 tablet, Rfl: 1    losartan (COZAAR) 50 mg tablet, Take 1 tablet (50 mg total) by mouth daily, Disp: 90 tablet, Rfl: 0    meloxicam (MOBIC) 15 mg tablet, Take 1 tablet (15 mg total) by mouth daily as needed for moderate pain, Disp: 30 tablet, Rfl: 1    metoprolol succinate (TOPROL-XL) 50 mg 24 hr tablet, TAKE 1 TABLET DAILY  HOLD FOR HEART RATE LESS THAN 50 BEATS PER MINUTE, Disp: 90 tablet, Rfl: 1    montelukast (SINGULAIR) 10 mg tablet, TAKE 1 TABLET DAILY, Disp: 90 tablet, Rfl: 0    Multiple Vitamins-Minerals (MULTIVITAMIN ADULT PO), Take by mouth, Disp: , Rfl:     Omega-3 1000 MG CAPS, Take by mouth, Disp: , Rfl:     oxybutynin (DITROPAN-XL) 10 MG 24 hr tablet, TAKE 1 TABLET DAILY AT BEDTIME, Disp: 90 tablet, Rfl: 1    Psyllium (METAMUCIL FIBER PO), Take by mouth, Disp: , Rfl:     Red Yeast Rice 600 MG CAPS, Take by mouth, Disp: , Rfl:     benzonatate (TESSALON PERLES) 100 mg capsule, Take 1 capsule (100 mg total) by mouth 3 (three) times a day as needed for cough, Disp: 30 capsule, Rfl: 0    Allergies   Allergen Reactions    Prednisone Tachycardia    Vicodin [Hydrocodone-Acetaminophen] Lightheadedness    Other Nasal Congestion     seasonal            Vitals:    04/22/22 1428   BP: 165/83   Pulse: 72       Objective:                    Right Knee Exam     Muscle Strength   The patient has normal right knee strength      Tenderness   The patient is experiencing tenderness in the lateral joint line and medial joint line  Range of Motion   Extension: 5   Flexion: 110 (With crepitation and stiffness)     Other   Erythema: absent  Sensation: normal  Swelling: mild  Effusion: no effusion present            Diagnostics, reviewed and taken today if performed as documented:    None performed          Procedures, if performed today:    Large joint arthrocentesis: R knee  Universal Protocol:  Consent: Verbal consent obtained  Risks and benefits: risks, benefits and alternatives were discussed  Consent given by: patient  Time out: Immediately prior to procedure a "time out" was called to verify the correct patient, procedure, equipment, support staff and site/side marked as required  Timeout called at: 4/22/2022 3:04 PM   Patient understanding: patient states understanding of the procedure being performed  Site marked: the operative site was marked  Patient identity confirmed: verbally with patient    Supporting Documentation  Indications: pain and diagnostic evaluation   Procedure Details  Location: knee - R knee  Preparation: Patient was prepped and draped in the usual sterile fashion  Needle size: 25 G  Ultrasound guidance: no  Approach: anterolateral  Medications administered: 12 mg betamethasone acetate-betamethasone sodium phosphate 6 (3-3) mg/mL; 2 mL lidocaine 1 %; 2 mL bupivacaine 0 25 %    Patient tolerance: patient tolerated the procedure well with no immediate complications  Dressing:  Sterile dressing applied            Portions of the record may have been created with voice recognition software  Occasional wrong word or "sound a like" substitutions may have occurred due to the inherent limitations of voice recognition software  Read the chart carefully and recognize, using context, where substitutions have occurred

## 2022-04-27 ENCOUNTER — OFFICE VISIT (OUTPATIENT)
Dept: OBGYN CLINIC | Facility: HOSPITAL | Age: 82
End: 2022-04-27
Payer: COMMERCIAL

## 2022-04-27 VITALS
HEART RATE: 62 BPM | WEIGHT: 231 LBS | DIASTOLIC BLOOD PRESSURE: 79 MMHG | BODY MASS INDEX: 38.49 KG/M2 | SYSTOLIC BLOOD PRESSURE: 152 MMHG | HEIGHT: 65 IN

## 2022-04-27 DIAGNOSIS — M18.11 PRIMARY OSTEOARTHRITIS OF FIRST CARPOMETACARPAL JOINT OF RIGHT HAND: Primary | ICD-10-CM

## 2022-04-27 PROCEDURE — 99213 OFFICE O/P EST LOW 20 MIN: CPT | Performed by: PHYSICIAN ASSISTANT

## 2022-04-27 PROCEDURE — 20600 DRAIN/INJ JOINT/BURSA W/O US: CPT | Performed by: PHYSICIAN ASSISTANT

## 2022-04-27 RX ADMIN — LIDOCAINE HYDROCHLORIDE 0.5 ML: 10 INJECTION, SOLUTION EPIDURAL; INFILTRATION; INTRACAUDAL; PERINEURAL at 15:04

## 2022-04-27 RX ADMIN — BETAMETHASONE SODIUM PHOSPHATE AND BETAMETHASONE ACETATE 6 MG: 3; 3 INJECTION, SUSPENSION INTRA-ARTICULAR; INTRALESIONAL; INTRAMUSCULAR; SOFT TISSUE at 15:04

## 2022-04-27 NOTE — PROGRESS NOTES
ASSESSMENT/PLAN:    Assessment:   Thumb CMC Arthritis  right    Plan:   Right CMC injection provided today    Follow Up: After Surgery    To Do Next Visit:  Sutures out      _____________________________________________________  CHIEF COMPLAINT:  Chief Complaint   Patient presents with    Right Thumb - Follow-up     CMC-Beta          SUBJECTIVE:  Shira Whitt is a 80 y o  female who presents for follow up regarding right thumb CMC arthritis  She was seen on Wednesday and decided to move forward with operative intervention for her arthritis  Patient currently has surgery scheduled for September and would like an injection for temporary pain relief as the surgery is > 3 months from now       PAST MEDICAL HISTORY:  Past Medical History:   Diagnosis Date    Seasonal allergies        PAST SURGICAL HISTORY:  Past Surgical History:   Procedure Laterality Date    ANKLE SURGERY      x2    APPENDECTOMY      CHOLECYSTECTOMY      laparoscopic    GALLBLADDER SURGERY      HYSTERECTOMY Bilateral     total abdominal with removal of both ovaries, age 39    INCISION TENDON SHEATH HAND      of a finger    NEUROPLASTY / TRANSPOSITION MEDIAN NERVE AT CARPAL TUNNEL      OOPHORECTOMY Bilateral     Age 39    TUBAL LIGATION         FAMILY HISTORY:  Family History   Problem Relation Age of Onset    Heart disease Mother         endocarditis    Heart disease Father     Prostate cancer Father 80    No Known Problems Sister     No Known Problems Daughter     No Known Problems Maternal Grandmother     No Known Problems Maternal Grandfather     Kidney cancer Paternal Grandmother 48    No Known Problems Paternal Grandfather     No Known Problems Sister     No Known Problems Son     No Known Problems Son     No Known Problems Son     No Known Problems Maternal Aunt     No Known Problems Maternal Aunt     No Known Problems Paternal Aunt     Breast cancer Paternal Aunt 62    No Known Problems Paternal Aunt     No Known Problems Paternal Aunt     Breast cancer Paternal Aunt 64       SOCIAL HISTORY:  Social History     Tobacco Use    Smoking status: Never Smoker    Smokeless tobacco: Never Used   Vaping Use    Vaping Use: Never used   Substance Use Topics    Alcohol use: Yes     Comment: wine with dinner; occasional use as per [employer]    Drug use: No       MEDICATIONS:    Current Outpatient Medications:     Aspirin (ASPIR-81 PO), Aspir-81 81 MG Oral Tablet Delayed Release TAKE 1 TABLET (81 MG TOTAL) BY MOUTH DAILY  Refills: 0    Solmon Sallies DO;  Started 27-May-2016 Active, Disp: , Rfl:     Biotin 1 MG CAPS, Biotin 1 MG Oral Capsule  Refills: 0    Solmon Sallies DO;  Started 27-May-2016 Active, Disp: , Rfl:     Cholecalciferol (VITAMIN D3 PO), Take by mouth, Disp: , Rfl:     Coral Calcium 1000 (390 Ca) MG TABS, Coral Calcium 500 MG Oral Tablet  Refills: 0    Solmon Sallies DO;  Started 27-May-2016 Active, Disp: , Rfl:     docusate sodium (COLACE) 100 mg capsule, Take by mouth 3 (three) times a day, Disp: , Rfl:     doxylamine (UNISON) 25 MG tablet, Sleep Aid 25 MG Oral Tablet  Refills: 0    Solmon Sallies DO;  Started 27-May-2016 Active, Disp: , Rfl:     Famotidine (PEPCID PO), Take 20 mg by mouth 2 (two) times a day  , Disp: , Rfl:     levothyroxine 125 mcg tablet, TAKE 1 TABLET DAILY, Disp: 90 tablet, Rfl: 1    losartan (COZAAR) 50 mg tablet, Take 1 tablet (50 mg total) by mouth daily, Disp: 90 tablet, Rfl: 0    meloxicam (MOBIC) 15 mg tablet, Take 1 tablet (15 mg total) by mouth daily as needed for moderate pain, Disp: 30 tablet, Rfl: 1    metoprolol succinate (TOPROL-XL) 50 mg 24 hr tablet, TAKE 1 TABLET DAILY   HOLD FOR HEART RATE LESS THAN 50 BEATS PER MINUTE, Disp: 90 tablet, Rfl: 1    montelukast (SINGULAIR) 10 mg tablet, TAKE 1 TABLET DAILY, Disp: 90 tablet, Rfl: 0    Multiple Vitamins-Minerals (MULTIVITAMIN ADULT PO), Take by mouth, Disp: , Rfl:     Omega-3 1000 MG CAPS, Take by mouth, Disp: , Rfl:   oxybutynin (DITROPAN-XL) 10 MG 24 hr tablet, TAKE 1 TABLET DAILY AT BEDTIME, Disp: 90 tablet, Rfl: 1    Psyllium (METAMUCIL FIBER PO), Take by mouth, Disp: , Rfl:     Red Yeast Rice 600 MG CAPS, Take by mouth, Disp: , Rfl:     benzonatate (TESSALON PERLES) 100 mg capsule, Take 1 capsule (100 mg total) by mouth 3 (three) times a day as needed for cough, Disp: 30 capsule, Rfl: 0    ALLERGIES:  Allergies   Allergen Reactions    Prednisone Tachycardia    Vicodin [Hydrocodone-Acetaminophen] Lightheadedness    Other Nasal Congestion     seasonal       REVIEW OF SYSTEMS:  Pertinent items are noted in HPI  A comprehensive review of systems was negative      LABS:  HgA1c: No results found for: HGBA1C  BMP:   Lab Results   Component Value Date    CALCIUM 9 9 04/02/2021    K 5 1 04/02/2021    CO2 27 04/02/2021     04/02/2021    BUN 22 04/02/2021    CREATININE 0 93 04/02/2021           _____________________________________________________  PHYSICAL EXAMINATION:  Vital signs: /79   Pulse 62   Ht 5' 5" (1 651 m)   Wt 105 kg (231 lb)   BMI 38 44 kg/m²   General: well developed and well nourished, alert, oriented times 3 and appears comfortable  Psychiatric: Normal  HEENT: Trachea Midline, No torticollis  Cardiovascular: No discernable arrhythmia  Pulmonary: No wheezing or stridor  Abdomen: No rebound or guarding  Extremities: No peripheral edema  Skin: No masses, erythema, lacerations, fluctation, ulcerations  Neurovascular: Sensation Intact to the Median, Ulnar, Radial Nerve, Motor Intact to the Median, Ulnar, Radial Nerve and Pulses Intact    MUSCULOSKELETAL EXAMINATION:  RIGHT SIDE:  CMC: No Instability, Positive grind and Positive tendnerness CMC    _____________________________________________________  STUDIES REVIEWED:  No Studies to review      PROCEDURES PERFORMED:  Small joint arthrocentesis: R thumb CMC  [place] Protocol:  Consent given by: patient  Patient understanding: patient states understanding of the procedure being performed  Site marked: the operative site was marked  Patient identity confirmed: verbally with patient    Supporting Documentation  Indications: pain   Procedure Details  Location: thumb - R thumb CMC  Preparation: Patient was prepped and draped in the usual sterile fashion  Needle size: 25 G  Ultrasound guidance: no  Approach: radial  Medications administered: 6 mg betamethasone acetate-betamethasone sodium phosphate 6 (3-3) mg/mL; 0 5 mL lidocaine (PF) 1 %    Patient tolerance: patient tolerated the procedure well with no immediate complications  Dressing:  Sterile dressing applied

## 2022-05-15 ENCOUNTER — RA CDI HCC (OUTPATIENT)
Dept: OTHER | Facility: HOSPITAL | Age: 82
End: 2022-05-15

## 2022-05-15 NOTE — PROGRESS NOTES
[facility]  coding opportunities       Chart reviewed, no opportunity found:   [address]        Patients Insurance     Medicare Insurance: [employer] Advantage

## 2022-05-25 DIAGNOSIS — T78.40XD ALLERGIC DISORDER, SUBSEQUENT ENCOUNTER: ICD-10-CM

## 2022-05-25 RX ORDER — MONTELUKAST SODIUM 10 MG/1
TABLET ORAL
Qty: 90 TABLET | Refills: 1 | Status: SHIPPED | OUTPATIENT
Start: 2022-05-25

## 2022-06-01 ENCOUNTER — OFFICE VISIT (OUTPATIENT)
Dept: FAMILY MEDICINE CLINIC | Facility: CLINIC | Age: 82
End: 2022-06-01
Payer: COMMERCIAL

## 2022-06-01 VITALS
HEIGHT: 65 IN | TEMPERATURE: 97.2 F | DIASTOLIC BLOOD PRESSURE: 66 MMHG | WEIGHT: 231 LBS | HEART RATE: 67 BPM | SYSTOLIC BLOOD PRESSURE: 132 MMHG | OXYGEN SATURATION: 98 % | BODY MASS INDEX: 38.49 KG/M2 | RESPIRATION RATE: 16 BRPM

## 2022-06-01 DIAGNOSIS — Z13.220 SCREENING CHOLESTEROL LEVEL: ICD-10-CM

## 2022-06-01 DIAGNOSIS — N18.30 STAGE 3 CHRONIC KIDNEY DISEASE, UNSPECIFIED WHETHER STAGE 3A OR 3B CKD (HCC): ICD-10-CM

## 2022-06-01 DIAGNOSIS — Z00.00 MEDICARE ANNUAL WELLNESS VISIT, SUBSEQUENT: Primary | ICD-10-CM

## 2022-06-01 DIAGNOSIS — I10 HYPERTENSION, UNSPECIFIED TYPE: ICD-10-CM

## 2022-06-01 DIAGNOSIS — E03.9 HYPOTHYROIDISM, UNSPECIFIED TYPE: ICD-10-CM

## 2022-06-01 PROCEDURE — 3288F FALL RISK ASSESSMENT DOCD: CPT | Performed by: INTERNAL MEDICINE

## 2022-06-01 PROCEDURE — 99214 OFFICE O/P EST MOD 30 MIN: CPT | Performed by: INTERNAL MEDICINE

## 2022-06-01 PROCEDURE — G0439 PPPS, SUBSEQ VISIT: HCPCS | Performed by: INTERNAL MEDICINE

## 2022-06-01 PROCEDURE — 3075F SYST BP GE 130 - 139MM HG: CPT | Performed by: INTERNAL MEDICINE

## 2022-06-01 PROCEDURE — 1170F FXNL STATUS ASSESSED: CPT | Performed by: INTERNAL MEDICINE

## 2022-06-01 PROCEDURE — 3078F DIAST BP <80 MM HG: CPT | Performed by: INTERNAL MEDICINE

## 2022-06-01 PROCEDURE — 1036F TOBACCO NON-USER: CPT | Performed by: INTERNAL MEDICINE

## 2022-06-01 PROCEDURE — 1125F AMNT PAIN NOTED PAIN PRSNT: CPT | Performed by: INTERNAL MEDICINE

## 2022-06-01 PROCEDURE — 1160F RVW MEDS BY RX/DR IN RCRD: CPT | Performed by: INTERNAL MEDICINE

## 2022-06-01 RX ORDER — MELOXICAM 15 MG/1
15 TABLET ORAL DAILY PRN
Qty: 90 TABLET | Refills: 1 | Status: SHIPPED | OUTPATIENT
Start: 2022-06-01

## 2022-06-01 NOTE — PROGRESS NOTES
Assessment and Plan:     Problem List Items Addressed This Visit        Endocrine    Hypothyroidism    Relevant Orders    TSH, 3rd generation with Free T4 reflex       Cardiovascular and Mediastinum    Hypertension    Relevant Medications    meloxicam (MOBIC) 15 mg tablet    Other Relevant Orders    Comprehensive metabolic panel    CBC       Genitourinary    Stage 3 chronic kidney disease, unspecified whether stage 3a or 3b CKD ([facility] )      Other Visit Diagnoses     Medicare annual wellness visit, subsequent    -  Primary    Screening cholesterol level        Relevant Orders    Lipid panel        BMI Counseling: Body mass index is 39 04 kg/m²  The BMI is above normal  Nutrition recommendations include decreasing portion sizes and limiting drinks that contain sugar  Exercise recommendations include exercising 3-5 times per week  No pharmacotherapy was ordered  Patient referred to PCP  Rationale for BMI follow-up plan is due to patient being overweight or obese  Preventive health issues were discussed with patient, and age appropriate screening tests were ordered as noted in patient's After Visit Summary  Personalized health advice and appropriate referrals for health education or preventive services given if needed, as noted in patient's After Visit Summary       History of Present Illness:     Patient presents for Medicare Annual Wellness visit    Patient Care Team:  Ny Smith DO as PCP - General     Problem List:     Patient Active Problem List   Diagnosis    Hypertension    Hypothyroidism    Overactive bladder    Palpitations    EKG, abnormal    Primary osteoarthritis of right knee    Chronic pain of right knee    Seasonal allergies    Obesity, morbid ([facility] )    Stage 3 chronic kidney disease, unspecified whether stage 3a or 3b CKD ([facility] )    Cough      Past Medical and Surgical History:     Past Medical History:   Diagnosis Date    Seasonal allergies      Past Surgical History:   Procedure Laterality Date    ANKLE SURGERY      x2    APPENDECTOMY      CHOLECYSTECTOMY      laparoscopic    GALLBLADDER SURGERY      HYSTERECTOMY Bilateral     total abdominal with removal of both ovaries, age 39    INCISION TENDON SHEATH HAND      of a finger    NEUROPLASTY / TRANSPOSITION MEDIAN NERVE AT CARPAL TUNNEL      OOPHORECTOMY Bilateral     Age 39    TUBAL LIGATION        Family History:     Family History   Problem Relation Age of Onset    Heart disease Mother         endocarditis    Heart disease Father     Prostate cancer Father 80    No Known Problems Sister     No Known Problems Daughter     No Known Problems Maternal Grandmother     No Known Problems Maternal Grandfather     Kidney cancer Paternal Grandmother 48    No Known Problems Paternal Grandfather     No Known Problems Sister     No Known Problems Son     No Known Problems Son     No Known Problems Son     No Known Problems Maternal Aunt     No Known Problems Maternal Aunt     No Known Problems Paternal Aunt     Breast cancer Paternal Aunt 62    No Known Problems Paternal Aunt     No Known Problems Paternal [de-identified]     Breast cancer Paternal Aunt 62      Social History:     Social History     Socioeconomic History    Marital status:       Spouse name: None    Number of children: None    Years of education: None    Highest education level: None   Occupational History    None   Tobacco Use    Smoking status: Never Smoker    Smokeless tobacco: Never Used   Vaping Use    Vaping Use: Never used   Substance and Sexual Activity    Alcohol use: Yes     Comment: wine with dinner; occasional use as per [employer]    Drug use: No    Sexual activity: None   Other Topics Concern    None   Social History Narrative    None     Social Determinants of Health     Financial Resource Strain: Not on file   Food Insecurity: Not on file   Transportation Needs: Not on file   Physical Activity: Not on file   Stress: Not on file Social Connections: Not on file   Intimate Partner Violence: Not on file   Housing Stability: Not on file      Medications and Allergies:     Current Outpatient Medications   Medication Sig Dispense Refill    Aspirin (ASPIR-81 PO) Aspir-81 81 MG Oral Tablet Delayed Release  TAKE 1 TABLET (81 MG TOTAL) BY MOUTH DAILY  Refills: 0       Laverta Malling DO;  Started 27-May-2016  Active      Biotin 1 MG CAPS Biotin 1 MG Oral Capsule   Refills: 0       Laverta Malling DO;  Started 27-May-2016  Active      Cholecalciferol (VITAMIN D3 PO) Take by mouth      Coral Calcium 1000 (390 Ca) MG TABS Coral Calcium 500 MG Oral Tablet   Refills: 0       Laverta Malling DO;  Started 27-May-2016  Active      docusate sodium (COLACE) 100 mg capsule Take by mouth 3 (three) times a day      doxylamine (UNISON) 25 MG tablet Sleep Aid 25 MG Oral Tablet   Refills: 0       Laverta Malling DO;  Started 27-May-2016  Active      Famotidine (PEPCID PO) Take 20 mg by mouth 2 (two) times a day        levothyroxine 125 mcg tablet TAKE 1 TABLET DAILY 90 tablet 1    losartan (COZAAR) 50 mg tablet Take 1 tablet (50 mg total) by mouth daily 90 tablet 0    meloxicam (MOBIC) 15 mg tablet Take 1 tablet (15 mg total) by mouth daily as needed for moderate pain 90 tablet 1    metoprolol succinate (TOPROL-XL) 50 mg 24 hr tablet TAKE 1 TABLET DAILY  HOLD FOR HEART RATE LESS THAN 50 BEATS PER MINUTE 90 tablet 1    montelukast (SINGULAIR) 10 mg tablet TAKE 1 TABLET DAILY 90 tablet 1    Multiple Vitamins-Minerals (MULTIVITAMIN ADULT PO) Take by mouth      Omega-3 1000 MG CAPS Take by mouth      oxybutynin (DITROPAN-XL) 10 MG 24 hr tablet TAKE 1 TABLET DAILY AT BEDTIME 90 tablet 1    Psyllium (METAMUCIL FIBER PO) Take by mouth      Red Yeast Rice 600 MG CAPS Take by mouth       No current facility-administered medications for this visit       Allergies   Allergen Reactions    Prednisone Tachycardia    Vicodin [Hydrocodone-Acetaminophen] Lightheadedness    Other Nasal Congestion     seasonal      Immunizations:     Immunization History   Administered Date(s) Administered    COVID-19 PFIZER VACCINE 0 3 ML IM 01/15/2021, 02/08/2021    INFLUENZA 09/15/2016, 09/14/2018, 09/13/2019, 10/04/2021    Pneumococcal Conjugate 13-Valent 09/15/2016    Pneumococcal Polysaccharide PPV23 10/01/2014    Tdap 09/15/2016    Zoster 07/31/2017      Health Maintenance:         Topic Date Due    Breast Cancer Screening: Mammogram  11/09/2022    DXA SCAN  11/09/2023         Topic Date Due    COVID-19 Vaccine (3 - Booster for [employer] series) 07/08/2021      Medicare Health Risk Assessment:     /66 (BP Location: Right arm, Patient Position: Sitting, Cuff Size: Large)   Pulse 67   Temp (!) 97 2 °F (36 2 °C) (Temporal)   Resp 16   Ht 5' 4 5" (1 638 m)   Wt 105 kg (231 lb)   SpO2 98%   BMI 39 04 kg/m²      Aliya Oquendo is here for her Subsequent Wellness visit  Health Risk Assessment:   Patient rates overall health as very good  Patient feels that their physical health rating is same  Patient is very satisfied with their life  Eyesight was rated as same  Hearing was rated as same  Patient feels that their emotional and mental health rating is same  Patients states they are never, rarely angry  Patient states they are sometimes unusually tired/fatigued  Pain experienced in the last 7 days has been some  Patient's pain rating has been 5/10  Patient states that she has experienced no weight loss or gain in last 6 months  Would like to lose some weight    Fall Risk Screening: In the past year, patient has experienced: no history of falling in past year      Urinary Incontinence Screening:   Patient has leaked urine accidently in the last six months  Home Safety:  Patient does not have trouble with stairs inside or outside of their home  Patient has working smoke alarms and has working carbon monoxide detector  Home safety hazards include: none  Nutrition:   Current diet is Regular  Medications:   Patient is currently taking over-the-counter supplements  OTC medications include: Multivitamin,colace,D3, biotin, metamucil, calcium, red yeast rice, pepcid,  Patient is able to manage medications  Activities of Daily Living (ADLs)/Instrumental Activities of Daily Living (IADLs):   Walk and transfer into and out of bed and chair?: Yes  Dress and groom yourself?: Yes    Bathe or shower yourself?: Yes    Feed yourself? Yes  Do your laundry/housekeeping?: Yes  Manage your money, pay your bills and track your expenses?: Yes  Make your own meals?: Yes    Do your own shopping?: Yes    Previous Hospitalizations:   Any hospitalizations or ED visits within the last 12 months?: No      Advance Care Planning:   Living will: Yes    Durable POA for healthcare: Yes    Advanced directive: Yes      Comments: Eladio Castellanos is durable poa  [phone]    Cognitive Screening:   Provider or family/friend/caregiver concerned regarding cognition?: No    PREVENTIVE SCREENINGS      Cardiovascular Screening:    General: Screening Current      Breast Cancer Screening:     General: Screening Current      Cervical Cancer Screening:    General: Screening Not Indicated      Osteoporosis Screening:    General: Screening Current      Lung Cancer Screening:     General: Screening Not Indicated    Screening, Brief Intervention, and Referral to Treatment (SBIRT)    Screening  Typical number of drinks in a day: 2  Typical number of drinks in a week: 14  Interpretation: Low risk drinking behavior  AUDIT-C Screenin) How often did you have a drink containing alcohol in the past year? 4 or more times a week  2) How many drinks did you have on a typical day when you were drinking in the past year?  1 to 2  3) How often did you have 6 or more drinks on one occasion in the past year? never    AUDIT-C Score: 4  Interpretation: Score 3-12 (female): POSITIVE screen for alcohol misuse    AUDIT Screenin) How often during the last year have you found that you were not able to stop drinking once you had started? 0 - never  5) How often during the last year have you failed to do what was normally expected from you because of drinking? 0 - never  6) How often during the last year have you needed a first drink in the morning to get yourself going after a heavy drinking session? 0 - never  7) How often during the last year have you had a feeling of guilt or remorse after drinking? 0 - never  8) How often during the last year have you been unable to remember what happened the night before because you had been drinking? 0 - never  9) Have you or someone else been injured as a result of your drinking? 0 - no  10) Has a relative or friend or a doctor or another health worker been concerned about your drinking or suggested you cut down? 0 - no    AUDIT Score: 4  Interpretation: Low risk alcohol consumption    Single Item Drug Screening:  How often have you used an illegal drug (including marijuana) or a prescription medication for non-medical reasons in the past year? never    Single Item Drug Screen Score: 0  Interpretation: Negative screen for possible drug use disorder    Brief Intervention  Healthy alcohol use/limits discussed         Jarrett Golden, DO

## 2022-06-01 NOTE — PATIENT INSTRUCTIONS
Medicare Preventive Visit Patient Instructions  Thank you for completing your Welcome to Medicare Visit or Medicare Annual Wellness Visit today  Your next wellness visit will be due in one year (6/2/2023)  The screening/preventive services that you may require over the next 5-10 years are detailed below  Some tests may not apply to you based off risk factors and/or age  Screening tests ordered at today's visit but not completed yet may show as past due  Also, please note that scanned in results may not display below  Preventive Screenings:  Service Recommendations Previous Testing/Comments   Colorectal Cancer Screening  * Colonoscopy    * Fecal Occult Blood Test (FOBT)/Fecal Immunochemical Test (FIT)  * Fecal DNA/Cologuard Test  * Flexible Sigmoidoscopy Age: 54-65 years old   Colonoscopy: every 10 years (may be performed more frequently if at higher risk)  OR  FOBT/FIT: every 1 year  OR  Cologuard: every 3 years  OR  Sigmoidoscopy: every 5 years  Screening may be recommended earlier than age 48 if at higher risk for colorectal cancer  Also, an individualized decision between you and your healthcare provider will decide whether screening between the ages of 74-80 would be appropriate  Colonoscopy: Not on file  FOBT/FIT: Not on file  Cologuard: Not on file  Sigmoidoscopy: Not on file          Breast Cancer Screening Age: 36 years old  Frequency: every 1-2 years  Not required if history of left and right mastectomy Mammogram: 11/09/2021    Screening Current   Cervical Cancer Screening Between the ages of 21-29, pap smear recommended once every 3 years  Between the ages of 33-67, can perform pap smear with HPV co-testing every 5 years     Recommendations may differ for women with a history of total hysterectomy, cervical cancer, or abnormal pap smears in past  Pap Smear: Not on file    Screening Not Indicated   Hepatitis C Screening Once for adults born between 1945 and 1965  More frequently in patients at high risk for Hepatitis C Hep C Antibody: Not on file        Diabetes Screening 1-2 times per year if you're at risk for diabetes or have pre-diabetes Fasting glucose: 88 mg/dL   A1C: No results in last 5 years        Cholesterol Screening Once every 5 years if you don't have a lipid disorder  May order more often based on risk factors  Lipid panel: 04/02/2021    Screening Current     Other Preventive Screenings Covered by Medicare:  1  Abdominal Aortic Aneurysm (AAA) Screening: covered once if your at risk  You're considered to be at risk if you have a family history of AAA  2  Lung Cancer Screening: covers low dose CT scan once per year if you meet all of the following conditions: (1) Age 50-69; (2) No signs or symptoms of lung cancer; (3) Current smoker or have quit smoking within the last 15 years; (4) You have a tobacco smoking history of at least 30 pack years (packs per day multiplied by number of years you smoked); (5) You get a written order from a healthcare provider  3  Glaucoma Screening: covered annually if you're considered high risk: (1) You have diabetes OR (2) Family history of glaucoma OR (3)  aged 48 and older OR (3)  American aged 72 and older  3  Osteoporosis Screening: covered every 2 years if you meet one of the following conditions: (1) You're estrogen deficient and at risk for osteoporosis based off medical history and other findings; (2) Have a vertebral abnormality; (3) On glucocorticoid therapy for more than 3 months; (4) Have primary hyperparathyroidism; (5) On osteoporosis medications and need to assess response to drug therapy  · Last bone density test (DXA Scan): 11/09/2021   5  HIV Screening: covered annually if you're between the age of 15-65  Also covered annually if you are younger than 13 and older than 72 with risk factors for HIV infection  For pregnant patients, it is covered up to 3 times per pregnancy      Immunizations:  Immunization Recommendations Influenza Vaccine Annual influenza vaccination during flu season is recommended for all persons aged >= 6 months who do not have contraindications   Pneumococcal Vaccine (Prevnar and Pneumovax)  * Prevnar = PCV13  * Pneumovax = PPSV23   Adults 25-60 years old: 1-3 doses may be recommended based on certain risk factors  Adults 72 years old: Prevnar (PCV13) vaccine recommended followed by Pneumovax (PPSV23) vaccine  If already received PPSV23 since turning 65, then PCV13 recommended at least one year after PPSV23 dose  Hepatitis B Vaccine 3 dose series if at intermediate or high risk (ex: diabetes, end stage renal disease, liver disease)   Tetanus (Td) Vaccine - COST NOT COVERED BY MEDICARE PART B Following completion of primary series, a booster dose should be given every 10 years to maintain immunity against tetanus  Td may also be given as tetanus wound prophylaxis  Tdap Vaccine - COST NOT COVERED BY MEDICARE PART B Recommended at least once for all adults  For pregnant patients, recommended with each pregnancy  Shingles Vaccine (Shingrix) - COST NOT COVERED BY MEDICARE PART B  2 shot series recommended in those aged 48 and above     Health Maintenance Due:      Topic Date Due    Breast Cancer Screening: Mammogram  11/09/2022    DXA SCAN  11/09/2023     Immunizations Due:      Topic Date Due    COVID-19 Vaccine (3 - Booster for [place] series) 07/08/2021     Advance Directives   What are advance directives? Advance directives are legal documents that state your wishes and plans for medical care  These plans are made ahead of time in case you lose your ability to make decisions for yourself  Advance directives can apply to any medical decision, such as the treatments you want, and if you want to donate organs  What are the types of advance directives? There are many types of advance directives, and each state has rules about how to use them   You may choose a combination of any of the following:  · Living will: This is a written record of the treatment you want  You can also choose which treatments you do not want, which to limit, and which to stop at a certain time  This includes surgery, medicine, IV fluid, and tube feedings  · Durable power of  for healthcare [facility]): This is a written record that states who you want to make healthcare choices for you when you are unable to make them for yourself  This person, called a proxy, is usually a family member or a friend  You may choose more than 1 proxy  · Do not resuscitate (DNR) order:  A DNR order is used in case your heart stops beating or you stop breathing  It is a request not to have certain forms of treatment, such as CPR  A DNR order may be included in other types of advance directives  · Medical directive: This covers the care that you want if you are in a coma, near death, or unable to make decisions for yourself  You can list the treatments you want for each condition  Treatment may include pain medicine, surgery, blood transfusions, dialysis, IV or tube feedings, and a ventilator (breathing machine)  · Values history: This document has questions about your views, beliefs, and how you feel and think about life  This information can help others choose the care that you would choose  Why are advance directives important? An advance directive helps you control your care  Although spoken wishes may be used, it is better to have your wishes written down  Spoken wishes can be misunderstood, or not followed  Treatments may be given even if you do not want them  An advance directive may make it easier for your family to make difficult choices about your care  Urinary Incontinence   Urinary incontinence (UI)  is when you lose control of your bladder  UI develops because your bladder cannot store or empty urine properly  The 3 most common types of UI are stress incontinence, urge incontinence, or both    Medicines:   · May be given to help strengthen your bladder control  Report any side effects of medication to your healthcare provider  Do pelvic muscle exercises often:  Your pelvic muscles help you stop urinating  Squeeze these muscles tight for 5 seconds, then relax for 5 seconds  Gradually work up to squeezing for 10 seconds  Do 3 sets of 15 repetitions a day, or as directed  This will help strengthen your pelvic muscles and improve bladder control  Train your bladder:  Go to the bathroom at set times, such as every 2 hours, even if you do not feel the urge to go  You can also try to hold your urine when you feel the urge to go  For example, hold your urine for 5 minutes when you feel the urge to go  As that becomes easier, hold your urine for 10 minutes  Self-care:   · Keep a UI record  Write down how often you leak urine and how much you leak  Make a note of what you were doing when you leaked urine  · Drink liquids as directed  You may need to limit the amount of liquid you drink to help control your urine leakage  Do not drink any liquid right before you go to bed  Limit or do not have drinks that contain caffeine or alcohol  · Prevent constipation  Eat a variety of high-fiber foods  Good examples are high-fiber cereals, beans, vegetables, and whole-grain breads  Walking is the best way to trigger your intestines to have a bowel movement  · Exercise regularly and maintain a healthy weight  Weight loss and exercise will decrease pressure on your bladder and help you control your leakage  · Use a catheter as directed  to help empty your bladder  A catheter is a tiny, plastic tube that is put into your bladder to drain your urine  · Go to behavior therapy as directed  Behavior therapy may be used to help you learn to control your urge to urinate      Weight Management   Why it is important to manage your weight:  Being overweight increases your risk of health conditions such as heart disease, high blood pressure, type 2 diabetes, and certain types of cancer  It can also increase your risk for osteoarthritis, sleep apnea, and other respiratory problems  Aim for a slow, steady weight loss  Even a small amount of weight loss can lower your risk of health problems  How to lose weight safely:  A safe and healthy way to lose weight is to eat fewer calories and get regular exercise  You can lose up about 1 pound a week by decreasing the number of calories you eat by 500 calories each day  Healthy meal plan for weight management:  A healthy meal plan includes a variety of foods, contains fewer calories, and helps you stay healthy  A healthy meal plan includes the following:  · Eat whole-grain foods more often  A healthy meal plan should contain fiber  Fiber is the part of grains, fruits, and vegetables that is not broken down by your body  Whole-grain foods are healthy and provide extra fiber in your diet  Some examples of whole-grain foods are whole-wheat breads and pastas, oatmeal, brown rice, and bulgur  · Eat a variety of vegetables every day  Include dark, leafy greens such as spinach, kale, shawn greens, and mustard greens  Eat yellow and orange vegetables such as carrots, sweet potatoes, and winter squash  · Eat a variety of fruits every day  Choose fresh or canned fruit (canned in its own juice or light syrup) instead of juice  Fruit juice has very little or no fiber  · Eat low-fat dairy foods  Drink fat-free (skim) milk or 1% milk  Eat fat-free yogurt and low-fat cottage cheese  Try low-fat cheeses such as mozzarella and other reduced-fat cheeses  · Choose meat and other protein foods that are low in fat  Choose beans or other legumes such as split peas or lentils  Choose fish, skinless poultry (chicken or turkey), or lean cuts of red meat (beef or pork)  Before you cook meat or poultry, cut off any visible fat  · Use less fat and oil  Try baking foods instead of frying them   Add less fat, such as margarine, sour cream, regular salad dressing and mayonnaise to foods  Eat fewer high-fat foods  Some examples of high-fat foods include french fries, doughnuts, ice cream, and cakes  · Eat fewer sweets  Limit foods and drinks that are high in sugar  This includes candy, cookies, regular soda, and sweetened drinks  Exercise:  Exercise at least 30 minutes per day on most days of the week  Some examples of exercise include walking, biking, dancing, and swimming  You can also fit in more physical activity by taking the stairs instead of the elevator or parking farther away from stores  Ask your healthcare provider about the best exercise plan for you  Alcohol Use and Your Health    Drinking too much can harm your health  Excessive alcohol use leads to about 88,000 death in the [place] each year, and shortens the life of those who diet by almost 30 years  Further, excessive drinking cost the economy $249 billion in 2010  Most excessive drinkers are not alcohol dependent  Excessive alcohol use has immediate effects that increase the risk of many harmful health conditions  These are most often the result of binge drinking  Over time, excessive alcohol use can lead to the development of chronic diseases and other series health problems  What is considered a "drink"? Excessive alcohol use includes:  · Binge Drinking: For women, 4 or more drinks consumed on one occasion  For men, 5 or more drinks consumed on one occasion  · Heavy Drinking: For women, 8 or more drinks per week  For men, 15 or more drinks per week  · Any alcohol used by pregnant women  · Any alcohol used by those under the age of 21 years    If you choose to drink, do so in moderation:  · Do not drink at all if you are under the age of 24, or if you are or may be pregnant, or have health problems that could be made worse by drinking    · For women, up to 1 drink per day  · For men, up to 2 drinks a day    No one should begin drinking or drink more frequently based on potential health benefits    Short-Term Health Risks:  · Injuries: motor vehicle crashes, falls, drownings, burns  · Violence: homicide, suicide, sexual assault, intimate partner violence  · Alcohol poisoning  · Reproductive health: risky sexual behaviors, unintended prengnacy, sexually transmitted diseases, miscarriage, stillbirth, fetal alcohol syndrome    Long-Term Health Risks:  · Chronic diseases: high blood pressure, heart disease, stroke, liver disease, digestive problems  · Cancers: breast, mouth and throat, liver, colon  · Learning and memory problems: dementia, poor school performance  · Mental health: depression, anxiety, insomnia  · Social problems: lost productivity, family problems, unemployment  · Alcohol dependence    For support and more information:  · Substance Abuse and [facility] [address] [place]  Web Address: https://[employer]VCT/    · Alcoholics Anonymous        Web Address: http://[employer]/    https://www cdc gov/alcohol/fact-sheets/alcohol-use htm     © Copyright [employer] 2018 Information is for End User's use only and may not be sold, redistributed or otherwise used for commercial purposes   All illustrations and images included in CareNotes® are the copyrighted property of [employer]  or [address] [place], [place] [address]pe

## 2022-06-08 NOTE — PROGRESS NOTES
Assessment/Plan:    No problem-specific Assessment & Plan notes found for this encounter  Diagnoses and all orders for this visit:    Medicare annual wellness visit, subsequent    Hypertension, unspecified type  Comments:  controlled  Orders:  -     meloxicam (MOBIC) 15 mg tablet; Take 1 tablet (15 mg total) by mouth daily as needed for moderate pain  -     Comprehensive metabolic panel; Future  -     CBC; Future    Hypothyroidism, unspecified type  Comments:  on replacement  Orders:  -     TSH, 3rd generation with Free T4 reflex; Future    Stage 3 chronic kidney disease, unspecified whether stage 3a or 3b CKD (HCC)    Screening cholesterol level  -     Lipid panel; Future          Subjective:      Patient ID: Nabil Cooper is a [de-identified] y o  female  Denies cp/sob/h/a  Due for lab      The following portions of the patient's history were reviewed and updated as appropriate: She  has a past medical history of Seasonal allergies  She   Patient Active Problem List    Diagnosis Date Noted    Cough 01/04/2022    Stage 3 chronic kidney disease, unspecified whether stage 3a or 3b CKD [facility] ) 11/24/2021    Obesity, morbid ([facility] ) 11/17/2020    Seasonal allergies 05/08/2019    Primary osteoarthritis of right knee 06/08/2018    Chronic pain of right knee 06/08/2018    Hypertension 01/24/2018    Hypothyroidism 01/24/2018    Overactive bladder 01/24/2018    Palpitations 01/24/2018    EKG, abnormal 01/24/2018     She  has a past surgical history that includes Gallbladder surgery; Appendectomy; Ankle surgery; Cholecystectomy; Incision tendon sheath hand; Neuroplasty / transposition median nerve at carpal tunnel; Tubal ligation; Oophorectomy (Bilateral); and Hysterectomy (Bilateral)  Her family history includes Breast cancer (age of onset: 64) in her paternal aunt; Breast cancer (age of onset: 62) in her paternal aunt;  Heart disease in her father and mother; Kidney cancer (age of onset: 48) in her paternal grandmother; No Known Problems in her daughter, maternal aunt, maternal aunt, maternal grandfather, maternal grandmother, paternal aunt, paternal aunt, paternal aunt, paternal grandfather, sister, sister, son, son, and son; Prostate cancer (age of onset: 80) in her father  She  reports that she has never smoked  She has never used smokeless tobacco  She reports current alcohol use  She reports that she does not use drugs  Current Outpatient Medications   Medication Sig Dispense Refill    Aspirin (ASPIR-81 PO) Aspir-81 81 MG Oral Tablet Delayed Release  TAKE 1 TABLET (81 MG TOTAL) BY MOUTH DAILY  Refills: 0       Lindsey Dills DO;  Started 27-May-2016  Active      Biotin 1 MG CAPS Biotin 1 MG Oral Capsule   Refills: 0       Lindsey Dills DO;  Started 27-May-2016  Active      Cholecalciferol (VITAMIN D3 PO) Take by mouth      Coral Calcium 1000 (390 Ca) MG TABS Coral Calcium 500 MG Oral Tablet   Refills: 0       Lindsey Dills DO;  Started 27-May-2016  Active      docusate sodium (COLACE) 100 mg capsule Take by mouth 3 (three) times a day      doxylamine (UNISON) 25 MG tablet Sleep Aid 25 MG Oral Tablet   Refills: 0       Lindsey Dills DO;  Started 27-May-2016  Active      Famotidine (PEPCID PO) Take 20 mg by mouth 2 (two) times a day        levothyroxine 125 mcg tablet TAKE 1 TABLET DAILY 90 tablet 1    losartan (COZAAR) 50 mg tablet Take 1 tablet (50 mg total) by mouth daily 90 tablet 0    meloxicam (MOBIC) 15 mg tablet Take 1 tablet (15 mg total) by mouth daily as needed for moderate pain 90 tablet 1    metoprolol succinate (TOPROL-XL) 50 mg 24 hr tablet TAKE 1 TABLET DAILY   HOLD FOR HEART RATE LESS THAN 50 BEATS PER MINUTE 90 tablet 1    montelukast (SINGULAIR) 10 mg tablet TAKE 1 TABLET DAILY 90 tablet 1    Multiple Vitamins-Minerals (MULTIVITAMIN ADULT PO) Take by mouth      Omega-3 1000 MG CAPS Take by mouth      oxybutynin (DITROPAN-XL) 10 MG 24 hr tablet TAKE 1 TABLET DAILY AT BEDTIME 90 tablet 1    Psyllium (METAMUCIL FIBER PO) Take by mouth      Red Yeast Rice 600 MG CAPS Take by mouth       No current facility-administered medications for this visit  Current Outpatient Medications on File Prior to Visit   Medication Sig    Aspirin (ASPIR-81 PO) Aspir-81 81 MG Oral Tablet Delayed Release  TAKE 1 TABLET (81 MG TOTAL) BY MOUTH DAILY  Refills: 0       Dolores Julia DO;  Started 27-May-2016  Active    Biotin 1 MG CAPS Biotin 1 MG Oral Capsule   Refills: 0       Dolores Julia DO;  Started 27-May-2016  Active    Cholecalciferol (VITAMIN D3 PO) Take by mouth    Coral Calcium 1000 (390 Ca) MG TABS Coral Calcium 500 MG Oral Tablet   Refills: 0       Dolores Julia DO;  Started 27-May-2016  Active    docusate sodium (COLACE) 100 mg capsule Take by mouth 3 (three) times a day    doxylamine (UNISON) 25 MG tablet Sleep Aid 25 MG Oral Tablet   Refills: 0       Dolores Julia DO;  Started 27-May-2016  Active    Famotidine (PEPCID PO) Take 20 mg by mouth 2 (two) times a day      levothyroxine 125 mcg tablet TAKE 1 TABLET DAILY    losartan (COZAAR) 50 mg tablet Take 1 tablet (50 mg total) by mouth daily    metoprolol succinate (TOPROL-XL) 50 mg 24 hr tablet TAKE 1 TABLET DAILY  HOLD FOR HEART RATE LESS THAN 50 BEATS PER MINUTE    montelukast (SINGULAIR) 10 mg tablet TAKE 1 TABLET DAILY    Multiple Vitamins-Minerals (MULTIVITAMIN ADULT PO) Take by mouth    Omega-3 1000 MG CAPS Take by mouth    oxybutynin (DITROPAN-XL) 10 MG 24 hr tablet TAKE 1 TABLET DAILY AT BEDTIME    Psyllium (METAMUCIL FIBER PO) Take by mouth    Red Yeast Rice 600 MG CAPS Take by mouth     No current facility-administered medications on file prior to visit  She is allergic to prednisone, vicodin [hydrocodone-acetaminophen], and other       Review of Systems   Constitutional: Negative for chills and fever  HENT: Negative  Respiratory: Negative  Cardiovascular: Negative            Objective:      /66 (BP Location: Right arm, Patient Position: Sitting, Cuff Size: Large)   Pulse 67   Temp (!) 97 2 °F (36 2 °C) (Temporal)   Resp 16   Ht 5' 4 5" (1 638 m)   Wt 105 kg (231 lb)   SpO2 98%   BMI 39 04 kg/m²          Physical Exam  Constitutional:       Appearance: She is obese  HENT:      Head: Normocephalic and atraumatic  Right Ear: Tympanic membrane and ear canal normal       Left Ear: Tympanic membrane and ear canal normal       Mouth/Throat:      Pharynx: No posterior oropharyngeal erythema  Cardiovascular:      Rate and Rhythm: Normal rate and regular rhythm  Pulmonary:      Effort: Pulmonary effort is normal       Breath sounds: Normal breath sounds  Musculoskeletal:      Cervical back: Neck supple  Lymphadenopathy:      Cervical: No cervical adenopathy  Neurological:      Mental Status: She is alert

## 2022-06-24 ENCOUNTER — APPOINTMENT (OUTPATIENT)
Dept: LAB | Age: 82
End: 2022-06-24
Payer: COMMERCIAL

## 2022-06-24 DIAGNOSIS — E03.9 HYPOTHYROIDISM, UNSPECIFIED TYPE: ICD-10-CM

## 2022-06-24 DIAGNOSIS — Z13.220 SCREENING CHOLESTEROL LEVEL: ICD-10-CM

## 2022-06-24 DIAGNOSIS — I10 HYPERTENSION, UNSPECIFIED TYPE: ICD-10-CM

## 2022-06-24 LAB
ALBUMIN SERPL BCP-MCNC: 3.4 G/DL (ref 3.5–5)
ALP SERPL-CCNC: 54 U/L (ref 46–116)
ALT SERPL W P-5'-P-CCNC: 38 U/L (ref 12–78)
ANION GAP SERPL CALCULATED.3IONS-SCNC: 4 MMOL/L (ref 4–13)
AST SERPL W P-5'-P-CCNC: 29 U/L (ref 5–45)
BILIRUB SERPL-MCNC: 0.53 MG/DL (ref 0.2–1)
BUN SERPL-MCNC: 14 MG/DL (ref 5–25)
CALCIUM ALBUM COR SERPL-MCNC: 9.8 MG/DL (ref 8.3–10.1)
CALCIUM SERPL-MCNC: 9.3 MG/DL (ref 8.3–10.1)
CHLORIDE SERPL-SCNC: 110 MMOL/L (ref 100–108)
CHOLEST SERPL-MCNC: 203 MG/DL
CO2 SERPL-SCNC: 28 MMOL/L (ref 21–32)
CREAT SERPL-MCNC: 0.9 MG/DL (ref 0.6–1.3)
ERYTHROCYTE [DISTWIDTH] IN BLOOD BY AUTOMATED COUNT: 12.7 % (ref 11.6–15.1)
GFR SERPL CREATININE-BSD FRML MDRD: 60 ML/MIN/1.73SQ M
GLUCOSE P FAST SERPL-MCNC: 93 MG/DL (ref 65–99)
HCT VFR BLD AUTO: 47 % (ref 34.8–46.1)
HDLC SERPL-MCNC: 72 MG/DL
HGB BLD-MCNC: 15.4 G/DL (ref 11.5–15.4)
LDLC SERPL CALC-MCNC: 108 MG/DL (ref 0–100)
MCH RBC QN AUTO: 33.9 PG (ref 26.8–34.3)
MCHC RBC AUTO-ENTMCNC: 32.8 G/DL (ref 31.4–37.4)
MCV RBC AUTO: 104 FL (ref 82–98)
NONHDLC SERPL-MCNC: 131 MG/DL
PLATELET # BLD AUTO: 210 THOUSANDS/UL (ref 149–390)
PMV BLD AUTO: 12.8 FL (ref 8.9–12.7)
POTASSIUM SERPL-SCNC: 4.6 MMOL/L (ref 3.5–5.3)
PROT SERPL-MCNC: 6.9 G/DL (ref 6.4–8.2)
RBC # BLD AUTO: 4.54 MILLION/UL (ref 3.81–5.12)
SODIUM SERPL-SCNC: 142 MMOL/L (ref 136–145)
TRIGL SERPL-MCNC: 115 MG/DL
TSH SERPL DL<=0.05 MIU/L-ACNC: 3.45 UIU/ML (ref 0.45–4.5)
WBC # BLD AUTO: 5.07 THOUSAND/UL (ref 4.31–10.16)

## 2022-06-24 PROCEDURE — 85027 COMPLETE CBC AUTOMATED: CPT

## 2022-06-24 PROCEDURE — 36415 COLL VENOUS BLD VENIPUNCTURE: CPT

## 2022-06-24 PROCEDURE — 84443 ASSAY THYROID STIM HORMONE: CPT

## 2022-06-24 PROCEDURE — 80053 COMPREHEN METABOLIC PANEL: CPT

## 2022-06-24 PROCEDURE — 80061 LIPID PANEL: CPT

## 2022-07-13 RX ORDER — BETAMETHASONE SODIUM PHOSPHATE AND BETAMETHASONE ACETATE 3; 3 MG/ML; MG/ML
6 INJECTION, SUSPENSION INTRA-ARTICULAR; INTRALESIONAL; INTRAMUSCULAR; SOFT TISSUE
Status: COMPLETED | OUTPATIENT
Start: 2022-04-27 | End: 2022-04-27

## 2022-07-13 RX ORDER — LIDOCAINE HYDROCHLORIDE 10 MG/ML
0.5 INJECTION, SOLUTION EPIDURAL; INFILTRATION; INTRACAUDAL; PERINEURAL
Status: COMPLETED | OUTPATIENT
Start: 2022-04-27 | End: 2022-04-27

## 2022-07-13 RX ORDER — BETAMETHASONE SODIUM PHOSPHATE AND BETAMETHASONE ACETATE 3; 3 MG/ML; MG/ML
3 INJECTION, SUSPENSION INTRA-ARTICULAR; INTRALESIONAL; INTRAMUSCULAR; SOFT TISSUE
Status: COMPLETED | OUTPATIENT
Start: 2022-02-07 | End: 2022-02-07

## 2022-07-13 RX ORDER — LIDOCAINE HYDROCHLORIDE 10 MG/ML
0.5 INJECTION, SOLUTION INFILTRATION; PERINEURAL
Status: COMPLETED | OUTPATIENT
Start: 2022-02-07 | End: 2022-02-07

## 2022-07-14 DIAGNOSIS — I10 HYPERTENSION, UNSPECIFIED TYPE: ICD-10-CM

## 2022-07-14 RX ORDER — LOSARTAN POTASSIUM 50 MG/1
TABLET ORAL
Qty: 90 TABLET | Refills: 3 | Status: SHIPPED | OUTPATIENT
Start: 2022-07-14

## 2022-07-22 ENCOUNTER — OFFICE VISIT (OUTPATIENT)
Dept: OBGYN CLINIC | Facility: MEDICAL CENTER | Age: 82
End: 2022-07-22
Payer: COMMERCIAL

## 2022-07-22 VITALS
HEIGHT: 65 IN | HEART RATE: 66 BPM | WEIGHT: 231 LBS | SYSTOLIC BLOOD PRESSURE: 152 MMHG | DIASTOLIC BLOOD PRESSURE: 84 MMHG | BODY MASS INDEX: 38.49 KG/M2

## 2022-07-22 DIAGNOSIS — M17.11 PRIMARY OSTEOARTHRITIS OF RIGHT KNEE: Primary | ICD-10-CM

## 2022-07-22 DIAGNOSIS — M75.41 IMPINGEMENT SYNDROME OF RIGHT SHOULDER: ICD-10-CM

## 2022-07-22 PROCEDURE — 3077F SYST BP >= 140 MM HG: CPT | Performed by: ORTHOPAEDIC SURGERY

## 2022-07-22 PROCEDURE — 1160F RVW MEDS BY RX/DR IN RCRD: CPT | Performed by: ORTHOPAEDIC SURGERY

## 2022-07-22 PROCEDURE — 99213 OFFICE O/P EST LOW 20 MIN: CPT | Performed by: ORTHOPAEDIC SURGERY

## 2022-07-22 PROCEDURE — 20610 DRAIN/INJ JOINT/BURSA W/O US: CPT | Performed by: ORTHOPAEDIC SURGERY

## 2022-07-22 PROCEDURE — 3079F DIAST BP 80-89 MM HG: CPT | Performed by: ORTHOPAEDIC SURGERY

## 2022-07-22 RX ORDER — BUPIVACAINE HYDROCHLORIDE 2.5 MG/ML
2 INJECTION, SOLUTION INFILTRATION; PERINEURAL
Status: COMPLETED | OUTPATIENT
Start: 2022-07-22 | End: 2022-07-22

## 2022-07-22 RX ORDER — BETAMETHASONE SODIUM PHOSPHATE AND BETAMETHASONE ACETATE 3; 3 MG/ML; MG/ML
12 INJECTION, SUSPENSION INTRA-ARTICULAR; INTRALESIONAL; INTRAMUSCULAR; SOFT TISSUE
Status: COMPLETED | OUTPATIENT
Start: 2022-07-22 | End: 2022-07-22

## 2022-07-22 RX ORDER — LIDOCAINE HYDROCHLORIDE 10 MG/ML
2 INJECTION, SOLUTION INFILTRATION; PERINEURAL
Status: COMPLETED | OUTPATIENT
Start: 2022-07-22 | End: 2022-07-22

## 2022-07-22 RX ADMIN — BETAMETHASONE SODIUM PHOSPHATE AND BETAMETHASONE ACETATE 12 MG: 3; 3 INJECTION, SUSPENSION INTRA-ARTICULAR; INTRALESIONAL; INTRAMUSCULAR; SOFT TISSUE at 14:39

## 2022-07-22 RX ADMIN — LIDOCAINE HYDROCHLORIDE 2 ML: 10 INJECTION, SOLUTION INFILTRATION; PERINEURAL at 14:39

## 2022-07-22 RX ADMIN — BUPIVACAINE HYDROCHLORIDE 2 ML: 2.5 INJECTION, SOLUTION INFILTRATION; PERINEURAL at 14:39

## 2022-07-22 NOTE — PROGRESS NOTES
Assessment:  1  Primary osteoarthritis of right knee     2  Impingement syndrome of right shoulder         Plan:  The patient was provided with right knee and right subacromial steroid injections  The patient tolerated the procedure well  The patient should follow up in 3 months  To do next visit:  Return in about 3 months (around 10/22/2022)  The above stated was discussed in layman's terms and the patient expressed understanding  All questions were answered to the patient's satisfaction  Scribe Attestation    I,:  Trudy Johnson am acting as a scribe while in the presence of the attending physician :       I,:  Jimenez Laura MD personally performed the services described in this documentation    as scribed in my presence :             Subjective:   Kassidy Cannon is a [de-identified] y o  female who presents for follow up of right knee  She is s/p right knee steroid injection with benefit, 4/22/22  Today she complains of right lateral knee pain  Prolonged weight bearing and repetitive bending aggravates while rest alleviates  She does use Aleve with benefit  She has had visco injections in past with benefit  She denies past right knee surgery  She also complains of right anterior shoulder pain  Using right arm can aggravate          Review of systems negative unless otherwise specified in HPI    Past Medical History:   Diagnosis Date    Seasonal allergies        Past Surgical History:   Procedure Laterality Date    ANKLE SURGERY      x2    APPENDECTOMY      CHOLECYSTECTOMY      laparoscopic    GALLBLADDER SURGERY      HYSTERECTOMY Bilateral     total abdominal with removal of both ovaries, age 39    [address] [facility] HAND      of a finger    NEUROPLASTY / TRANSPOSITION MEDIAN NERVE AT CARPAL TUNNEL      OOPHORECTOMY Bilateral     Age 39    TUBAL LIGATION         Family History   Problem Relation Age of Onset    Heart disease Mother         endocarditis    Heart disease Father     Prostate cancer Father 80    No Known Problems Sister     No Known Problems Daughter     No Known Problems Maternal Grandmother     No Known Problems Maternal Grandfather     Kidney cancer Paternal Grandmother 48    No Known Problems Paternal Grandfather     No Known Problems Sister     No Known Problems Son     No Known Problems Son     No Known Problems Son     No Known Problems Maternal Aunt     No Known Problems Maternal Aunt     No Known Problems Paternal Aunt     Breast cancer Paternal Aunt 62    No Known Problems Paternal Aunt     No Known Problems Paternal [de-identified]     Breast cancer Paternal Aunt 64       Social History     Occupational History    Not on file   Tobacco Use    Smoking status: Never Smoker    Smokeless tobacco: Never Used   Vaping Use    Vaping Use: Never used   Substance and Sexual Activity    Alcohol use: Yes     Comment: wine with dinner; occasional use as per [employer]    Drug use: No    Sexual activity: Not on file         Current Outpatient Medications:     Aspirin (ASPIR-81 PO), Aspir-81 81 MG Oral Tablet Delayed Release TAKE 1 TABLET (81 MG TOTAL) BY MOUTH DAILY    Refills: 0    Darcella Farrier DO;  Started 27-May-2016 Active, Disp: , Rfl:     Biotin 1 MG CAPS, Biotin 1 MG Oral Capsule  Refills: 0    Darcella Farrier DO;  Started 27-May-2016 Active, Disp: , Rfl:     Cholecalciferol (VITAMIN D3 PO), Take by mouth, Disp: , Rfl:     Coral Calcium 1000 (390 Ca) MG TABS, Coral Calcium 500 MG Oral Tablet  Refills: 0    Darcella Farrier DO;  Started 27-May-2016 Active, Disp: , Rfl:     docusate sodium (COLACE) 100 mg capsule, Take by mouth 3 (three) times a day, Disp: , Rfl:     doxylamine (UNISON) 25 MG tablet, Sleep Aid 25 MG Oral Tablet  Refills: 0    Darcella Farrier DO;  Started 27-May-2016 Active, Disp: , Rfl:     Famotidine (PEPCID PO), Take 20 mg by mouth 2 (two) times a day  , Disp: , Rfl:     levothyroxine 125 mcg tablet, TAKE 1 TABLET DAILY, Disp: 90 tablet, Rfl: 1    losartan (COZAAR) 50 mg tablet, TAKE 1 TABLET DAILY, Disp: 90 tablet, Rfl: 3    meloxicam (MOBIC) 15 mg tablet, Take 1 tablet (15 mg total) by mouth daily as needed for moderate pain, Disp: 90 tablet, Rfl: 1    metoprolol succinate (TOPROL-XL) 50 mg 24 hr tablet, TAKE 1 TABLET DAILY  HOLD FOR HEART RATE LESS THAN 50 BEATS PER MINUTE, Disp: 90 tablet, Rfl: 1    montelukast (SINGULAIR) 10 mg tablet, TAKE 1 TABLET DAILY, Disp: 90 tablet, Rfl: 1    Multiple Vitamins-Minerals (MULTIVITAMIN ADULT PO), Take by mouth, Disp: , Rfl:     Omega-3 1000 MG CAPS, Take by mouth, Disp: , Rfl:     oxybutynin (DITROPAN-XL) 10 MG 24 hr tablet, TAKE 1 TABLET DAILY AT BEDTIME, Disp: 90 tablet, Rfl: 1    Psyllium (METAMUCIL FIBER PO), Take by mouth, Disp: , Rfl:     Red Yeast Rice 600 MG CAPS, Take by mouth, Disp: , Rfl:     Allergies   Allergen Reactions    Prednisone Tachycardia    Vicodin [Hydrocodone-Acetaminophen] Lightheadedness    Other Nasal Congestion     seasonal            Vitals:    07/22/22 1357   BP: 152/84   Pulse: 66       Objective:  Physical exam  · General: Awake, Alert, Oriented  · Eyes: Pupils equal, round and reactive to light  · Heart: regular rate and rhythm  · Lungs: No audible wheezing  · Abdomen: soft                    Ortho Exam  Right knee:  Valgus alignment   TTP over lateral joint line  No erythema or ecchymosis  No effusion or swelling  Normal strength  Good ROM   Calf compartments soft and supple  Sensation intact  Toes are warm sensate and mobile    Right shoulder:  Restriction of ROM  Weakness with abduction  Positive Cuello    Diagnostics, reviewed and taken today if performed as documented:    None performed     Procedures, if performed today:    Large joint arthrocentesis: R knee  Universal Protocol:  Consent: Verbal consent obtained    Risks and benefits: risks, benefits and alternatives were discussed  Consent given by: patient  Time out: Immediately prior to procedure a "time out" was called to verify the correct patient, procedure, equipment, support staff and site/side marked as required  Patient understanding: patient states understanding of the procedure being performed  Site marked: the operative site was marked  Patient identity confirmed: verbally with patient    Supporting Documentation  Indications: pain   Procedure Details  Location: knee - R knee  Preparation: Patient was prepped and draped in the usual sterile fashion  Needle size: 22 G  Ultrasound guidance: no  Approach: anterolateral  Medications administered: 12 mg betamethasone acetate-betamethasone sodium phosphate 6 (3-3) mg/mL; 2 mL bupivacaine 0 25 %; 2 mL lidocaine 1 %    Patient tolerance: patient tolerated the procedure well with no immediate complications  Dressing:  Sterile dressing applied    Large joint arthrocentesis: R subacromial bursa  Universal Protocol:  Consent: Verbal consent obtained  Risks and benefits: risks, benefits and alternatives were discussed  Consent given by: patient  Time out: Immediately prior to procedure a "time out" was called to verify the correct patient, procedure, equipment, support staff and site/side marked as required    Timeout called at: 7/22/2022 2:38 PM   Patient understanding: patient states understanding of the procedure being performed  Site marked: the operative site was marked  Patient identity confirmed: verbally with patient    Supporting Documentation  Indications: pain   Procedure Details  Location: shoulder - R subacromial bursa  Needle size: 25 G  Ultrasound guidance: no  Approach: posterolateral  Medications administered: 2 mL bupivacaine 0 25 %; 12 mg betamethasone acetate-betamethasone sodium phosphate 6 (3-3) mg/mL; 2 mL lidocaine 1 %    Patient tolerance: patient tolerated the procedure well with no immediate complications  Dressing:  Sterile dressing applied            Portions of the record may have been created with voice recognition software  Occasional wrong word or "sound a like" substitutions may have occurred due to the inherent limitations of voice recognition software  Read the chart carefully and recognize, using context, where substitutions have occurred

## 2022-08-31 ENCOUNTER — OFFICE VISIT (OUTPATIENT)
Dept: FAMILY MEDICINE CLINIC | Facility: CLINIC | Age: 82
End: 2022-08-31
Payer: COMMERCIAL

## 2022-08-31 VITALS
BODY MASS INDEX: 37.82 KG/M2 | HEART RATE: 68 BPM | HEIGHT: 65 IN | OXYGEN SATURATION: 96 % | DIASTOLIC BLOOD PRESSURE: 80 MMHG | TEMPERATURE: 97.6 F | WEIGHT: 227 LBS | SYSTOLIC BLOOD PRESSURE: 112 MMHG

## 2022-08-31 DIAGNOSIS — L23.9 ALLERGIC CONTACT DERMATITIS, UNSPECIFIED TRIGGER: Primary | ICD-10-CM

## 2022-08-31 PROCEDURE — 3074F SYST BP LT 130 MM HG: CPT | Performed by: PHYSICIAN ASSISTANT

## 2022-08-31 PROCEDURE — 3725F SCREEN DEPRESSION PERFORMED: CPT | Performed by: PHYSICIAN ASSISTANT

## 2022-08-31 PROCEDURE — 1101F PT FALLS ASSESS-DOCD LE1/YR: CPT | Performed by: PHYSICIAN ASSISTANT

## 2022-08-31 PROCEDURE — 1160F RVW MEDS BY RX/DR IN RCRD: CPT | Performed by: PHYSICIAN ASSISTANT

## 2022-08-31 PROCEDURE — 3079F DIAST BP 80-89 MM HG: CPT | Performed by: PHYSICIAN ASSISTANT

## 2022-08-31 PROCEDURE — 3288F FALL RISK ASSESSMENT DOCD: CPT | Performed by: PHYSICIAN ASSISTANT

## 2022-08-31 PROCEDURE — 99213 OFFICE O/P EST LOW 20 MIN: CPT | Performed by: PHYSICIAN ASSISTANT

## 2022-08-31 NOTE — PROGRESS NOTES
Assessment/Plan:     Diagnoses and all orders for this visit:    Allergic contact dermatitis, unspecified trigger  Comments:  Suspect poison ivy  Cannot take oral steroid use topical steroids over-the-counter Zyrtec and Benadryl  Orders:  -     betamethasone valerate (VALISONE) 0 1 % cream; Apply topically 2 (two) times a day          Subjective:      Patient ID: Jamie Haddad is a [de-identified] y o  female  Patient presents in the office with a rash which started Monday  Rash distributed right and left antecubital fossa  Today the rash is spreading on her left lower leg  Rash is red and raised and itchy  Patient is using topical cortisone and Benadryl little relief  Patient states the only thing she did different was eat a sciatic kind of fish on Friday  Symptoms did not appear till Monday  His rash appears to be allergic contact dermatitis  Patient did remember that last week she did pull some weeds out of her flower pots  The rash is red and raised mildly vesicular  Nonblanching  No oozing or draining  The following portions of the patient's history were reviewed and updated as appropriate:   She   Patient Active Problem List    Diagnosis Date Noted    Cough 01/04/2022    Stage 3 chronic kidney disease, unspecified whether stage 3a or 3b CKD ([facility] [address] ) 11/24/2021    Obesity, morbid ([facility] [address] ) 11/17/2020    Seasonal allergies 05/08/2019    Primary osteoarthritis of right knee 06/08/2018    Chronic pain of right knee 06/08/2018    Hypertension 01/24/2018    Hypothyroidism 01/24/2018    Overactive bladder 01/24/2018    Palpitations 01/24/2018    EKG, abnormal 01/24/2018     Current Outpatient Medications   Medication Sig Dispense Refill    betamethasone valerate (VALISONE) 0 1 % cream Apply topically 2 (two) times a day 45 g 2    Aspirin (ASPIR-81 PO) Aspir-81 81 MG Oral Tablet Delayed Release  TAKE 1 TABLET (81 MG TOTAL) BY MOUTH DAILY     Refills: 0       Micki Cano DO;  Started 27-May-2016  Active  Biotin 1 MG CAPS Biotin 1 MG Oral Capsule   Refills: 0       Mono Shiver DO;  Started 27-May-2016  Active      Cholecalciferol (VITAMIN D3 PO) Take by mouth      Coral Calcium 1000 (390 Ca) MG TABS Coral Calcium 500 MG Oral Tablet   Refills: 0       Mono Shiver DO;  Started 27-May-2016  Active      docusate sodium (COLACE) 100 mg capsule Take by mouth 3 (three) times a day      doxylamine (UNISON) 25 MG tablet Sleep Aid 25 MG Oral Tablet   Refills: 0       Mono Shiver DO;  Started 27-May-2016  Active      Famotidine (PEPCID PO) Take 20 mg by mouth 2 (two) times a day        levothyroxine 125 mcg tablet TAKE 1 TABLET DAILY 90 tablet 1    losartan (COZAAR) 50 mg tablet TAKE 1 TABLET DAILY 90 tablet 3    meloxicam (MOBIC) 15 mg tablet Take 1 tablet (15 mg total) by mouth daily as needed for moderate pain 90 tablet 1    metoprolol succinate (TOPROL-XL) 50 mg 24 hr tablet TAKE 1 TABLET DAILY  HOLD FOR HEART RATE LESS THAN 50 BEATS PER MINUTE 90 tablet 1    montelukast (SINGULAIR) 10 mg tablet TAKE 1 TABLET DAILY 90 tablet 1    Multiple Vitamins-Minerals (MULTIVITAMIN ADULT PO) Take by mouth      Omega-3 1000 MG CAPS Take by mouth      oxybutynin (DITROPAN-XL) 10 MG 24 hr tablet TAKE 1 TABLET DAILY AT BEDTIME 90 tablet 1    Psyllium (METAMUCIL FIBER PO) Take by mouth      Red Yeast Rice 600 MG CAPS Take by mouth       No current facility-administered medications for this visit  She is allergic to prednisone, vicodin [hydrocodone-acetaminophen], and other       Review of Systems      Objective:        Physical Exam  Constitutional:       Appearance: Normal appearance  HENT:      Head: Normocephalic and atraumatic  Right Ear: External ear normal       Left Ear: External ear normal    Cardiovascular:      Rate and Rhythm: Normal rate and regular rhythm  Heart sounds: No murmur heard  Pulmonary:      Effort: Pulmonary effort is normal       Breath sounds: Normal breath sounds   No wheezing  Musculoskeletal:      Right lower leg: No edema  Left lower leg: No edema  Skin:     General: Skin is warm and dry  Findings: Rash present  Comments: Red raise slightly vesicular rash right and left antecubital fossa is  Also right and left anterior shins  Neurological:      Mental Status: She is alert

## 2022-09-01 ENCOUNTER — APPOINTMENT (OUTPATIENT)
Dept: PREADMISSION TESTING | Facility: HOSPITAL | Age: 82
End: 2022-09-01
Payer: COMMERCIAL

## 2022-09-06 ENCOUNTER — OFFICE VISIT (OUTPATIENT)
Dept: FAMILY MEDICINE CLINIC | Facility: CLINIC | Age: 82
End: 2022-09-06
Payer: COMMERCIAL

## 2022-09-06 ENCOUNTER — APPOINTMENT (OUTPATIENT)
Dept: LAB | Facility: MEDICAL CENTER | Age: 82
End: 2022-09-06
Payer: COMMERCIAL

## 2022-09-06 VITALS
HEART RATE: 72 BPM | SYSTOLIC BLOOD PRESSURE: 120 MMHG | OXYGEN SATURATION: 97 % | TEMPERATURE: 97.8 F | DIASTOLIC BLOOD PRESSURE: 76 MMHG

## 2022-09-06 DIAGNOSIS — R21 RASH: Primary | ICD-10-CM

## 2022-09-06 DIAGNOSIS — R21 RASH: ICD-10-CM

## 2022-09-06 LAB
ERYTHROCYTE [DISTWIDTH] IN BLOOD BY AUTOMATED COUNT: 12.4 % (ref 11.6–15.1)
HCT VFR BLD AUTO: 47 % (ref 34.8–46.1)
HGB BLD-MCNC: 15.6 G/DL (ref 11.5–15.4)
MCH RBC QN AUTO: 34.8 PG (ref 26.8–34.3)
MCHC RBC AUTO-ENTMCNC: 33.2 G/DL (ref 31.4–37.4)
MCV RBC AUTO: 105 FL (ref 82–98)
PLATELET # BLD AUTO: 197 THOUSANDS/UL (ref 149–390)
PMV BLD AUTO: 12.8 FL (ref 8.9–12.7)
RBC # BLD AUTO: 4.48 MILLION/UL (ref 3.81–5.12)
WBC # BLD AUTO: 5.85 THOUSAND/UL (ref 4.31–10.16)

## 2022-09-06 PROCEDURE — 85027 COMPLETE CBC AUTOMATED: CPT

## 2022-09-06 PROCEDURE — 3078F DIAST BP <80 MM HG: CPT | Performed by: FAMILY MEDICINE

## 2022-09-06 PROCEDURE — 99214 OFFICE O/P EST MOD 30 MIN: CPT | Performed by: FAMILY MEDICINE

## 2022-09-06 PROCEDURE — 3074F SYST BP LT 130 MM HG: CPT | Performed by: FAMILY MEDICINE

## 2022-09-06 PROCEDURE — 36415 COLL VENOUS BLD VENIPUNCTURE: CPT

## 2022-09-06 RX ORDER — METHYLPREDNISOLONE 4 MG/1
TABLET ORAL
Qty: 21 EACH | Refills: 0 | Status: SHIPPED | OUTPATIENT
Start: 2022-09-06

## 2022-09-06 NOTE — ASSESSMENT & PLAN NOTE
Rash on the bilateral upper and lower extremity worsening since last evaluation  Will start patient on oral steroid for treatment  Obtain CBC for evaluation of platelet counts  Patient has upcoming surgery, if rash does not resolve in time of surgery, consider evaluation by Dermatology

## 2022-09-06 NOTE — PROGRESS NOTES
Assessment/Plan:    Rash  Rash on the bilateral upper and lower extremity worsening since last evaluation  Will start patient on oral steroid for treatment  Obtain CBC for evaluation of platelet counts  Patient has upcoming surgery, if rash does not resolve in time of surgery, consider evaluation by Dermatology      Subjective:      Patient ID: Talia Wells is a [de-identified] y o  female  HPI    [de-identified]Year old female patient presents for evaluation of rash  Patient was seen in the office on 08/31/2022 and prescribed topical steroid for treatment  Since then has rash has spread to involve all 4 extremities  Rash initially started on last Monday evening, 08/29/2022  Patient denies any specific allergies other than seasonal allergy  Denies any history of rash in the similar pattern in the past   Denies any new medications  Only new change in her life was going out to eat at a restaurant on Friday and having a fish she has never had previously  Patient eat seafood regularly, denies any tingling sensation in her mouth  Patient denies any new shampoo, closing, detergents  Review of Systems   Constitutional: Negative for appetite change, chills, fatigue, fever and unexpected weight change  HENT: Positive for ear pain (Left-sided ear pain)  Negative for sore throat, tinnitus and trouble swallowing  Eyes: Negative for visual disturbance  Respiratory: Negative for shortness of breath  Cardiovascular: Negative for chest pain and palpitations  Gastrointestinal: Negative for abdominal pain, blood in stool, constipation, diarrhea, nausea and vomiting  Genitourinary: Negative for dysuria, frequency, hematuria and urgency  Musculoskeletal: Negative for arthralgias, joint swelling and myalgias  Skin: Positive for rash  Neurological: Negative for dizziness, weakness, light-headedness, numbness and headaches  Psychiatric/Behavioral: Negative for self-injury and sleep disturbance   The patient is not nervous/anxious  Objective:    /76 (BP Location: Left arm, Patient Position: Sitting, Cuff Size: Large)   Pulse 72   Temp 97 8 °F (36 6 °C) (Temporal)   SpO2 97%       Physical Exam  Vitals reviewed  Constitutional:       General: She is not in acute distress  Appearance: Normal appearance  She is obese  She is not ill-appearing, toxic-appearing or diaphoretic  Cardiovascular:      Rate and Rhythm: Normal rate and regular rhythm  Pulses: Normal pulses  Heart sounds: Normal heart sounds  No murmur heard  Pulmonary:      Effort: Pulmonary effort is normal  No respiratory distress  Breath sounds: Normal breath sounds  Abdominal:      General: Abdomen is flat  Bowel sounds are normal  There is no distension  Palpations: Abdomen is soft  Musculoskeletal:         General: No swelling, tenderness, deformity or signs of injury  Normal range of motion  Skin:     Capillary Refill: Capillary refill takes less than 2 seconds  Comments: Patient has generalized macular papular rash involving bilateral upper and lower extremity  No blistering, no open wounds  Neurological:      General: No focal deficit present  Mental Status: She is alert and oriented to person, place, and time  Psychiatric:         Mood and Affect: Mood normal           COREY Lackey  Family Medicine    Please excuse any "sound-alike" errors that may have ocurred during the process of dictation  Parts of this note have been dictated and there may be errors present in the transcription process  Thank you

## 2022-09-08 ENCOUNTER — ANESTHESIA EVENT (OUTPATIENT)
Dept: PERIOP | Facility: HOSPITAL | Age: 82
End: 2022-09-08
Payer: COMMERCIAL

## 2022-09-08 NOTE — PRE-PROCEDURE INSTRUCTIONS
Pre-Surgery Instructions:   Medication Instructions   • Aspirin (ASPIR-81 PO) instructed to stop   • Biotin 1 MG CAPS instructed to stop   • Cholecalciferol (VITAMIN D3 PO) instructed to stop   • Coral Calcium 1000 (390 Ca) MG TABS instructed to stop   • docusate sodium (COLACE) 100 mg capsule Hold day of surgery  • doxylamine (UNISON) 25 MG tablet Take night before surgery   • Famotidine (PEPCID PO) Take day of surgery  • levothyroxine 125 mcg tablet Take day of surgery  • losartan (COZAAR) 50 mg tablet Hold day of surgery  • meloxicam (MOBIC) 15 mg tablet instructed to stop   • methylPREDNISolone 4 MG tablet therapy pack will be done by DOS   • metoprolol succinate (TOPROL-XL) 50 mg 24 hr tablet Take night before surgery   • montelukast (SINGULAIR) 10 mg tablet Hold day of surgery  • Multiple Vitamins-Minerals (MULTIVITAMIN ADULT PO) instructed to stop   • Omega-3 1000 MG CAPS instructed to stop   • oxybutynin (DITROPAN-XL) 10 MG 24 hr tablet Take night before surgery   • Psyllium (METAMUCIL FIBER PO) Hold day of surgery  • Red Yeast Rice 600 MG CAPS instructed to stop    [facility] preop instructions reviewed with pt  Pt has surgical soap

## 2022-09-13 ENCOUNTER — ANESTHESIA (OUTPATIENT)
Dept: PERIOP | Facility: HOSPITAL | Age: 82
End: 2022-09-13
Payer: COMMERCIAL

## 2022-10-12 PROBLEM — R05.9 COUGH: Status: RESOLVED | Noted: 2022-01-04 | Resolved: 2022-10-12

## 2022-10-24 NOTE — ANESTHESIA PREPROCEDURE EVALUATION
Procedure:  INTERPOSITIONAL ARTHROPLASTY THUMB (Right Finger)  RELEASE DEQUERVAINS (Right Hand)    Relevant Problems   CARDIO   (+) Hypertension      ENDO   (+) Hypothyroidism      /RENAL   (+) Stage 3 chronic kidney disease, unspecified whether stage 3a or 3b CKD (HCC)      MUSCULOSKELETAL   (+) Primary osteoarthritis of right knee      Other   (+) EKG, abnormal (SR  Old Inf, Ant  MIs)             Anesthesia Plan  ASA Score- 3     Anesthesia Type- IV sedation with anesthesia with ASA Monitors  Additional Monitors:   Airway Plan:     Comment: Supraclavicular Block planned  Plan Factors-    Chart reviewed  EKG reviewed  Patient is not a current smoker  Induction- intravenous  Postoperative Plan-     Informed Consent- Anesthetic plan and risks discussed with patient  I personally reviewed this patient with the CRNA  Discussed and agreed on the Anesthesia Plan with the CRNA  Yeison Roger Discussed with Patient the procedure for Supraclavicular Block, side effects including extended numbness of the extremity and potential for an incomplete Block  All questions answered  Consent given

## 2022-10-25 ENCOUNTER — HOSPITAL ENCOUNTER (OUTPATIENT)
Facility: HOSPITAL | Age: 82
Setting detail: OUTPATIENT SURGERY
Discharge: HOME | End: 2022-10-25
Attending: ORTHOPAEDIC SURGERY | Admitting: ORTHOPAEDIC SURGERY
Payer: COMMERCIAL

## 2022-10-25 VITALS
RESPIRATION RATE: 16 BRPM | TEMPERATURE: 96.7 F | BODY MASS INDEX: 37.49 KG/M2 | HEIGHT: 65 IN | OXYGEN SATURATION: 94 % | DIASTOLIC BLOOD PRESSURE: 72 MMHG | WEIGHT: 225 LBS | SYSTOLIC BLOOD PRESSURE: 170 MMHG | HEART RATE: 67 BPM

## 2022-10-25 DIAGNOSIS — M18.11 ARTHRITIS OF CARPOMETACARPAL (CMC) JOINT OF RIGHT THUMB: Primary | ICD-10-CM

## 2022-10-25 PROCEDURE — 25447 ARTHRP NTRCRP/CRP/MTCR NTRPS: CPT | Performed by: PHYSICIAN ASSISTANT

## 2022-10-25 PROCEDURE — 25447 ARTHRP NTRCRP/CRP/MTCR NTRPS: CPT | Performed by: ORTHOPAEDIC SURGERY

## 2022-10-25 PROCEDURE — 25000 INCISION OF TENDON SHEATH: CPT | Performed by: ORTHOPAEDIC SURGERY

## 2022-10-25 PROCEDURE — C1765 ADHESION BARRIER: HCPCS | Performed by: ORTHOPAEDIC SURGERY

## 2022-10-25 PROCEDURE — NC001 PR NO CHARGE: Performed by: ORTHOPAEDIC SURGERY

## 2022-10-25 PROCEDURE — 25000 INCISION OF TENDON SHEATH: CPT | Performed by: PHYSICIAN ASSISTANT

## 2022-10-25 RX ORDER — CEFAZOLIN SODIUM 1 G/3ML
INJECTION, POWDER, FOR SOLUTION INTRAMUSCULAR; INTRAVENOUS AS NEEDED
Status: DISCONTINUED | OUTPATIENT
Start: 2022-10-25 | End: 2022-10-25

## 2022-10-25 RX ORDER — SODIUM CHLORIDE, SODIUM LACTATE, POTASSIUM CHLORIDE, CALCIUM CHLORIDE 600; 310; 30; 20 MG/100ML; MG/100ML; MG/100ML; MG/100ML
125 INJECTION, SOLUTION INTRAVENOUS CONTINUOUS
Status: DISCONTINUED | OUTPATIENT
Start: 2022-10-25 | End: 2022-10-25

## 2022-10-25 RX ORDER — ONDANSETRON 2 MG/ML
4 INJECTION INTRAMUSCULAR; INTRAVENOUS EVERY 6 HOURS PRN
Status: DISCONTINUED | OUTPATIENT
Start: 2022-10-25 | End: 2022-10-25 | Stop reason: HOSPADM

## 2022-10-25 RX ORDER — ONDANSETRON 2 MG/ML
4 INJECTION INTRAMUSCULAR; INTRAVENOUS ONCE AS NEEDED
Status: DISCONTINUED | OUTPATIENT
Start: 2022-10-25 | End: 2022-10-25 | Stop reason: HOSPADM

## 2022-10-25 RX ORDER — PROPOFOL 10 MG/ML
INJECTION, EMULSION INTRAVENOUS AS NEEDED
Status: DISCONTINUED | OUTPATIENT
Start: 2022-10-25 | End: 2022-10-25

## 2022-10-25 RX ORDER — MAGNESIUM HYDROXIDE 1200 MG/15ML
LIQUID ORAL AS NEEDED
Status: DISCONTINUED | OUTPATIENT
Start: 2022-10-25 | End: 2022-10-25 | Stop reason: HOSPADM

## 2022-10-25 RX ORDER — ACETAMINOPHEN 325 MG/1
650 TABLET ORAL EVERY 6 HOURS PRN
Status: DISCONTINUED | OUTPATIENT
Start: 2022-10-25 | End: 2022-10-25 | Stop reason: HOSPADM

## 2022-10-25 RX ORDER — CHLORHEXIDINE GLUCONATE 4 G/100ML
SOLUTION TOPICAL DAILY PRN
Status: DISCONTINUED | OUTPATIENT
Start: 2022-10-25 | End: 2022-10-25

## 2022-10-25 RX ORDER — TRAMADOL HYDROCHLORIDE 50 MG/1
50 TABLET ORAL EVERY 6 HOURS PRN
Status: DISCONTINUED | OUTPATIENT
Start: 2022-10-25 | End: 2022-10-25 | Stop reason: HOSPADM

## 2022-10-25 RX ORDER — FENTANYL CITRATE 50 UG/ML
INJECTION, SOLUTION INTRAMUSCULAR; INTRAVENOUS AS NEEDED
Status: DISCONTINUED | OUTPATIENT
Start: 2022-10-25 | End: 2022-10-25

## 2022-10-25 RX ORDER — ROPIVACAINE HYDROCHLORIDE 5 MG/ML
INJECTION, SOLUTION EPIDURAL; INFILTRATION; PERINEURAL AS NEEDED
Status: DISCONTINUED | OUTPATIENT
Start: 2022-10-25 | End: 2022-10-25

## 2022-10-25 RX ORDER — SENNOSIDES 8.6 MG
650 CAPSULE ORAL EVERY 8 HOURS PRN
Qty: 30 TABLET | Refills: 0 | Status: SHIPPED | OUTPATIENT
Start: 2022-10-25

## 2022-10-25 RX ORDER — PROPOFOL 10 MG/ML
INJECTION, EMULSION INTRAVENOUS CONTINUOUS PRN
Status: DISCONTINUED | OUTPATIENT
Start: 2022-10-25 | End: 2022-10-25

## 2022-10-25 RX ORDER — COVID-19 ANTIGEN TEST
220 KIT MISCELLANEOUS 2 TIMES DAILY
Qty: 60 CAPSULE | Refills: 0 | Status: SHIPPED | OUTPATIENT
Start: 2022-10-25 | End: 2022-11-24

## 2022-10-25 RX ORDER — LIDOCAINE HYDROCHLORIDE 10 MG/ML
0.5 INJECTION, SOLUTION EPIDURAL; INFILTRATION; INTRACAUDAL; PERINEURAL ONCE AS NEEDED
Status: DISCONTINUED | OUTPATIENT
Start: 2022-10-25 | End: 2022-10-25

## 2022-10-25 RX ORDER — HYDROCODONE BITARTRATE AND ACETAMINOPHEN 5; 325 MG/1; MG/1
1 TABLET ORAL EVERY 6 HOURS PRN
Qty: 10 TABLET | Refills: 0 | Status: SHIPPED | OUTPATIENT
Start: 2022-10-25 | End: 2022-11-04

## 2022-10-25 RX ORDER — CEFAZOLIN SODIUM 2 G/50ML
2000 SOLUTION INTRAVENOUS ONCE
Status: DISCONTINUED | OUTPATIENT
Start: 2022-10-25 | End: 2022-10-25 | Stop reason: HOSPADM

## 2022-10-25 RX ORDER — FENTANYL CITRATE/PF 50 MCG/ML
12.5 SYRINGE (ML) INJECTION
Status: DISCONTINUED | OUTPATIENT
Start: 2022-10-25 | End: 2022-10-25 | Stop reason: HOSPADM

## 2022-10-25 RX ORDER — LABETALOL HYDROCHLORIDE 5 MG/ML
10 INJECTION, SOLUTION INTRAVENOUS ONCE
Status: DISCONTINUED | OUTPATIENT
Start: 2022-10-25 | End: 2022-10-25 | Stop reason: HOSPADM

## 2022-10-25 RX ORDER — CHLORHEXIDINE GLUCONATE 0.12 MG/ML
15 RINSE ORAL ONCE
Status: DISCONTINUED | OUTPATIENT
Start: 2022-10-25 | End: 2022-10-25

## 2022-10-25 RX ADMIN — PROPOFOL 30 MG: 10 INJECTION, EMULSION INTRAVENOUS at 11:07

## 2022-10-25 RX ADMIN — CEFAZOLIN 2000 MG: 1 INJECTION, POWDER, FOR SOLUTION INTRAMUSCULAR; INTRAVENOUS at 11:10

## 2022-10-25 RX ADMIN — PROPOFOL 60 MCG/KG/MIN: 10 INJECTION, EMULSION INTRAVENOUS at 11:07

## 2022-10-25 RX ADMIN — FENTANYL CITRATE 25 MCG: 50 INJECTION INTRAMUSCULAR; INTRAVENOUS at 11:20

## 2022-10-25 RX ADMIN — FENTANYL CITRATE 50 MCG: 50 INJECTION INTRAMUSCULAR; INTRAVENOUS at 10:26

## 2022-10-25 RX ADMIN — SODIUM CHLORIDE, SODIUM LACTATE, POTASSIUM CHLORIDE, AND CALCIUM CHLORIDE 125 ML/HR: .6; .31; .03; .02 INJECTION, SOLUTION INTRAVENOUS at 10:02

## 2022-10-25 RX ADMIN — FENTANYL CITRATE 25 MCG: 50 INJECTION INTRAMUSCULAR; INTRAVENOUS at 11:13

## 2022-10-25 RX ADMIN — ROPIVACAINE HYDROCHLORIDE 30 ML: 5 INJECTION, SOLUTION EPIDURAL; INFILTRATION; PERINEURAL at 10:35

## 2022-10-25 NOTE — ANESTHESIA PROCEDURE NOTES
Peripheral Block    Patient location during procedure: holding area  Start time: 10/25/2022 12:05 PM  Reason for block: at surgeon's request and post-op pain management  Staffing  Performed: Anesthesiologist   Anesthesiologist: Faith Erickson MD  Preanesthetic Checklist  Completed: patient identified, IV checked, site marked, risks and benefits discussed, surgical consent, monitors and equipment checked, pre-op evaluation and timeout performed  Peripheral Block  Patient position: Semirecumbent  Prep: ChloraPrep  Patient monitoring: heart rate and continuous pulse ox  Block type: supraclavicular  Laterality: right  Injection technique: single-shot  Procedures: ultrasound guided, Ultrasound guidance required for the procedure to increase accuracy and safety of medication placement and decrease risk of complications  Ultrasound permanent image saved  Needle  Needle type: Stimuplex   Needle gauge: 20G    Needle length: 10 cm  Needle localization: ultrasound guidance  Needle insertion depth: 3 cm  Test dose: negative  Assessment  Injection assessment: incremental injection, local visualized surrounding nerve on ultrasound, negative aspiration for heme and no paresthesia on injection  Paresthesia pain: none  Heart rate change: no  Slow fractionated injection: yes  Post-procedure:  site cleaned  patient tolerated the procedure well with no immediate complications

## 2022-10-25 NOTE — H&P
H&P Exam - Orthopedics   Ulisses Mooney [de-identified] y o  female MRN: [id number]  Unit/Bed#: P311 B PRE    Assessment/Plan   Assessment:  Right Thumb CMC arthritis, right de Quervain tenosynovitis    Plan:  Patient will be scheduled for a Right thumb CMC interpositional arthroplasty, right de Quervain release    History of Present Illness   HPI:  Ulisses Mooney is a [de-identified] y o  female who presents with pain at the base of her right thumb  She has had previous cortisone injections with declining relief of her symptoms  She would like to proceed with surgical intervention at this time      Historical Information  Review Of Systems:   · Skin: Normal  · Neuro: See HPI  · Musculoskeletal: See HPI  · 14 point review of systems negative except as stated above     Past Medical History:   Past Medical History:   Diagnosis Date   • Arthritis    • Disease of thyroid gland     hypo   • GERD (gastroesophageal reflux disease)    • Headache    • Hypertension    • Overactive bladder    • Seasonal allergies    • Wears glasses        Past Surgical History:   Past Surgical History:   Procedure Laterality Date   • ANKLE SURGERY      x2   • APPENDECTOMY     • CHOLECYSTECTOMY      laparoscopic   • COLONOSCOPY     • GALLBLADDER SURGERY     • HYSTERECTOMY Bilateral     total abdominal with removal of both ovaries, age 39   • INCISION TENDON SHEATH HAND      of a finger   • NEUROPLASTY / TRANSPOSITION MEDIAN NERVE AT CARPAL TUNNEL     • OOPHORECTOMY Bilateral     Age 39   • TUBAL LIGATION         Family History:  Family history reviewed and non-contributory  Family History   Problem Relation Age of Onset   • Heart disease Mother         endocarditis   • Heart disease Father    • Prostate cancer Father 80   • No Known Problems Sister    • No Known Problems Daughter    • No Known Problems Maternal Grandmother    • No Known Problems Maternal Grandfather    • Kidney cancer Paternal Grandmother 48   • No Known Problems Paternal Grandfather    • No Known Problems Sister    • No Known Problems Son    • No Known Problems Son    • No Known Problems Son    • No Known Problems Maternal Aunt    • No Known Problems Maternal Aunt    • No Known Problems Paternal Aunt    • Breast cancer Paternal Aunt 59   • No Known Problems Paternal Aunt    • No Known Problems Paternal Aunt    • Breast cancer Paternal Aunt 62       Social History:  Social History     Socioeconomic History   • Marital status:      Spouse name: None   • Number of children: None   • Years of education: None   • Highest education level: None   Occupational History   • None   Tobacco Use   • Smoking status: Never Smoker   • Smokeless tobacco: Never Used   Vaping Use   • Vaping Use: Never used   Substance and Sexual Activity   • Alcohol use: Yes     Comment: wine with dinner; occasional use as per [employer]   • Drug use: No   • Sexual activity: None   Other Topics Concern   • None   Social History Narrative   • None     Social Determinants of Health     Financial Resource Strain: Not on file   Food Insecurity: Not on file   Transportation Needs: Not on file   Physical Activity: Not on file   Stress: Not on file   Social Connections: Not on file   Intimate Partner Violence: Not on file   Housing Stability: Not on file       Allergies:    Allergies   Allergen Reactions   • Vicodin [Hydrocodone-Acetaminophen] Lightheadedness   • Other Nasal Congestion     seasonal           Labs:  0   Lab Value Date/Time    HCT 47 0 (H) 09/06/2022 1345    HCT 47 0 (H) 06/24/2022 0709    HCT 48 7 (H) 04/02/2021 1109    HGB 15 6 (H) 09/06/2022 1345    HGB 15 4 06/24/2022 0709    HGB 16 4 (H) 04/02/2021 1109    WBC 5 85 09/06/2022 1345    WBC 5 07 06/24/2022 0709    WBC 6 42 04/02/2021 1109    ESR 7 09/16/2017 1157       Meds:    Current Facility-Administered Medications:   •  lactated ringers infusion, 125 mL/hr, Intravenous, Continuous, Clement Carrasquillo MD, Last Rate: 125 mL/hr at 10/25/22 1002, 125 mL/hr at 10/25/22 1002  • lidocaine (PF) (XYLOCAINE-MPF) 1 % injection 0 5 mL, 0 5 mL, Infiltration, Once PRN, Medhat Marcial MD  •  lidocaine-epinephrine (XYLOCAINE/EPINEPHRINE) 1 %-1:100,000 20 mL, sodium bicarbonate 2 mEq infiltration, , Infiltration, Once, Patria Trujillo MD    Blood Culture:   No results found for: BLOODCX    Wound Culture:   No results found for: WOUNDCULT    Ins and Outs:  No intake/output data recorded  Physical Exam  /79   Pulse 83   Temp (!) 97 3 °F (36 3 °C)   Resp 16   Ht 5' 5" (1 651 m)   Wt 102 kg (225 lb)   SpO2 97%   BMI 37 44 kg/m²   /79   Pulse 83   Temp (!) 97 3 °F (36 3 °C)   Resp 16   Ht 5' 5" (1 651 m)   Wt 102 kg (225 lb)   SpO2 97%   BMI 37 44 kg/m²   Gen: No acute distress, resting comfortably in bed  HEENT: Eyes clear, moist mucus membranes, hearing intact  Respiratory: No audible wheezing or stridor  Cardiovascular: Well Perfused peripherally, 2+ distal pulse  Abdomen: nondistended, no peritoneal signs  Ortho Exam: right CMC Exam:  No adduction contracture  No hyperextension deformity of MCP joint  Positive localized tenderness over radial and dorsal aspect of thumb (CMC joint)  Grind test is Positive for pain and Positive for crepitus  No triggering or tenderness over the A1 pulley  No pain with Finkelstein’s maneuver   De Quervain's Tenosynovitis Exam:  right side    Positive tender to palpation over 1st dorsal extensor compartment   Positive palpable nodule  Positive crepitus over 1st dorsal extensor compartment   Positive Finkelstein's test    Positive pain with resisted abduction of the thumb    Neuro Exam:  Patient is neurovascular intact from a median, radial and ulnar nerve distribution    Capillary refill less than 2 seconds    Lab Results: Reviewed  Imaging: Reviewed

## 2022-10-25 NOTE — ADDENDUM NOTE
Addendum  created 10/25/22 1351 by Liliam Estrella MD    Order list changed, Pharmacy for encounter modified

## 2022-10-25 NOTE — ANESTHESIA POSTPROCEDURE EVALUATION
Post-Op Assessment Note    CV Status:  Stable  Pain Score: 0    Pain management: adequate     Mental Status:  Alert and awake   Hydration Status:  Euvolemic   PONV Controlled:  Controlled   Airway Patency:  Patent      Post Op Vitals Reviewed: Yes      Staff: CRNA   Comments: Pt awake, alert, able to maintain own airway, VSS, report to recovery RN        No complications documented      BP  141/65   Temp  97 8   Pulse  80   Resp   16   SpO2   95% RA

## 2022-10-25 NOTE — OP NOTE
OPERATIVE REPORT  PATIENT NAME: Aracely Franco  :  1940  MRN: [id number]  Pt Location: [facility]    SURGERY DATE: 10/25/22    Surgeon(s) and Role:     * Jaelyn Cleary MD - Primary     * Philip Tolentino PA-C - Assisting    Pre-Op Diagnosis:  Primary osteoarthritis of first carpometacarpal joint of right hand [M18 11]  De Quervain's syndrome (tenosynovitis) [M65 4]    Post-Op Diagnosis:    Primary osteoarthritis of first carpometacarpal joint of right hand [M18 11]  De Quervain's syndrome (tenosynovitis) [M65 4]    Procedure(s) (LRB):  Right thumb interpositional arthroplasty (Right)  Right de Quervain release (Right)  Application short-arm thumb spica splint (Right)    Specimen(s):  * No orders in the log *    Estimated Blood Loss:   Minimal      Anesthesia Type:   Regional with Sedation    Operative Indications: The patient has a history of right thumb carpometacarpal joint osteoarthritis with de Quervain stenosing tenosynovitis that was recalcitrant to conservative management  The decision was made to bring the patient to the operating room for right thumb interpositional arthroplasty with de Quervain release  Risks of the procedure were explained which include, but are not limited to bleeding; infection; damage to nerves, arteries,veins, tendons; scar; pain; need for reoperation; failure to give desired result; and risks of anaesthesia  All questions were answered to satisfaction and they were willing to proceed  Operative Findings:  Stage III right thumb CMC arthritis  Estil Hazard disease right wrist    Complications:   None    Procedure and Technique:  After the patient, site, and procedure were identified, the patient was brought into the operating room in a supine position  Regional anaesthesia and sedation were provided  A well padded tourniquet was applied to the extremity, set at 250 mmHg    The  right upper extremity was then prepped and drapped in a normal, sterile, orthopedic fashion  After the patient, site, and procedure were once again identified, attention was turned to the right thumb  A curvilinear incision was made at the junction of the glaborous and nonglaborous skin extending toward the flexor carpi radialis tendon  Care was taken to protect the superficial sensory branch of the radial nerve, the radial artery, the median nerve, the palmar cutaneous branch of the median nerve, the flexor carpi radialis tendon, the abductor pollicis longus tendon and the extensor pollicis brevis tendon  The thenar muscles were elevated off of the thumb metacarpal and an arthrotomy was done at the trapeziometacarpal and scaphotrapezial joint  The trapezium was removed in its entirety  Inspection revealed stage 4 (full thickiness loss of the articular cartilage with exposed subchondral bone) arthritis at the level of the metacarpal base, stage 3 (partial thickness loss of the articular cartilage) arthritis at the distal pole of the scaphoid, and inspection of the scaphotrapezoid joint revealed stage 2 (some articular fissures) arthritis  At this point, the thumb was held in a reduced position  Utilizing a 2  FiberWire suture, the base of the flexor carpi radialis was sutured to the insertion of the adductor pollicis longus tendon securing the thumb in this reduced position  Multiple passes were then made  A longitudinal stress test was then performed demonstrating no evidence of subsidence or collapse  The thumb was sitting in a good resting position at the completion of multiple suture passes     Gelfoam was placed in the remainder of the hole  At this point in time, retractors were then placed on the proximal radial aspect of the incision  We will to protect the superficial sensory branches the radial nerve  The abductor pollicis longus and extensor pollicis brevis tendons were evaluated and were able to be traced down to the level of the 1st dorsal extensor compartment    Under direct visualization, a small scissor was then used to divide the 1st dorsal extensor compartment from distal to proximal underneath the skin tunnel releasing and completing our de Quervain release  We verified release of the tendon through direct visualization  At the completion of the procedure, hemostasis was obtained with cautery and direct pressure  The wounds were copiously irrigated with sterile solution  The wounds were closed with Vicryl and Prolene  Sterile dressings were applied, including Xeroform, gauze, tweeners, webril, ACE and Thumb Spica Splint  Please note, all sponge, needle, and instrument counts were correct prior to closure  Loupe magnification was utilized  The patient tolerated the procedure well       I was present for all critical portions of the procedure, A qualified resident physician was not available and A physician assistant was required during the procedure for retraction tissue handling,dissection and suturing    Patient Disposition:  PACU  and hemodynamically stable    SIGNATURE: Maxime Newsome  DATE: 10/25/22  TIME: 12:02 PM

## 2022-11-04 ENCOUNTER — OFFICE VISIT (OUTPATIENT)
Dept: OBGYN CLINIC | Facility: HOSPITAL | Age: 82
End: 2022-11-04

## 2022-11-04 VITALS
BODY MASS INDEX: 37.49 KG/M2 | SYSTOLIC BLOOD PRESSURE: 152 MMHG | DIASTOLIC BLOOD PRESSURE: 82 MMHG | WEIGHT: 225 LBS | HEART RATE: 73 BPM | HEIGHT: 65 IN

## 2022-11-04 DIAGNOSIS — M18.11 PRIMARY OSTEOARTHRITIS OF FIRST CARPOMETACARPAL JOINT OF RIGHT HAND: Primary | ICD-10-CM

## 2022-11-04 NOTE — PROGRESS NOTES
Assessment:   S/P Right thumb interpositional arthroplasty - Right, Right de Quervain release - Right, and Application short-arm thumb spica splint - Right on 10/25/2022    Plan:   [facility]ilby Post op Protocol     Precautions Maintain webspace, no CMC Adduction x 6wks  -2 wks - Comfort Cool   Begin ROM, edema control, scar management , fine motor   -6wks - begin strengthening     Patient was provided a Comfort Cool brace at this time  She was encouraged to attend occupational therapy as per protocol   She was advised to avoid any heavy lifting with the right upper extremity  She will follow-up in 6 weeks for re-evaluation    Follow Up:  6  week(s)    To Do Next Visit:  Re-evaluation      CHIEF COMPLAINT:  Chief Complaint   Patient presents with   • Right Thumb - Post-op, Suture / Staple Removal     Right thumb interpositional arthroplasty - Right   Right de Quervain release- 10/25/22              SUBJECTIVE:  Pasco Prader is a [de-identified] y o  female who presents for follow up after Right thumb interpositional arthroplasty - Right, Right de Quervain release - Right, and Application short-arm thumb spica splint - Right on 10/25/2022  Today patient has minimal pain and discomfort at this time  She states that her pain is under control         PHYSICAL EXAMINATION:  Vital signs: /82   Pulse 73   Ht 5' 5" (1 651 m)   Wt 102 kg (225 lb)   BMI 37 44 kg/m²   General: well developed and well nourished, alert, oriented times 3 and appears comfortable  Psychiatric: Normal    MUSCULOSKELETAL EXAMINATION:  Incision: Clean, dry, intact  Range of Motion: As expected and Limited due to stiffness  Neurovascular status: Neuro intact, good cap refill  Activity Restrictions: Cast/splint restrictions  Done today: Sutures out      STUDIES REVIEWED:  No Studies to review      PROCEDURES PERFORMED:  Procedures  No Procedures performed today

## 2022-11-10 ENCOUNTER — EVALUATION (OUTPATIENT)
Dept: OCCUPATIONAL THERAPY | Age: 82
End: 2022-11-10

## 2022-11-10 DIAGNOSIS — M18.11 PRIMARY OSTEOARTHRITIS OF FIRST CARPOMETACARPAL JOINT OF RIGHT HAND: ICD-10-CM

## 2022-11-10 NOTE — PROGRESS NOTES
OT Evaluation     Today's date: 11/10/2022  Patient name: Karla Romo  : 1940  MRN: [id number]  Referring provider: Marietta Hill PA-C  Dx:   Encounter Diagnosis     ICD-10-CM    1  Primary osteoarthritis of first carpometacarpal joint of right hand  M18 11 Ambulatory Referral to PT/OT Hand Therapy                  Assessment  Assessment details: Flor Hernandez is 2 weeks  Post-op R weilby; she has surgical precautions, stiffness, pain and swelling affecting her ADLs, IADLs  Goals  STG) Motion increased by 25% in 4-6 weeks  STG) SwellingEdema improved by 25% in 4-6 weeks  STG) Pain decreased by 25% in 4-6 weeks    LTG) ADL and IADL skills improved   LTG) School/Work skills improved  LTG) Leisure skills improved    Patient Goals: To get full use of her hand without pain; is a quilter      Goals, plan of care and treatment condition discussed with patient  Patient expresses their understanding and questions regarding these issues were addressed  Plan  Patient would benefit from: OT eval and custom splinting  Planned modality interventions: TENS, thermotherapy: hydrocollator packs, thermotherapy: paraffin bath and ultrasound  Planned therapy interventions: neuromuscular re-education, motor coordination training, manual therapy, joint mobilization, Marquez taping, strengthening, stretching, therapeutic activities, therapeutic exercise, functional ROM exercises, fine motor coordination training and orthotic fitting/training  Frequency: 2x week  Duration in visits: 10  Treatment plan discussed with: patient        Subjective Evaluation    History of Present Illness  Mechanism of injury: 10/25 R interposition arthroplasty + 1st dorsal extensor compartment release; reports hx for several years    Pain  Current pain ratin  At worst pain ratin    Hand dominance: right          Objective     Active Range of Motion     Right Wrist   Wrist flexion: 45 degrees   Wrist extension: 50 degrees   Radial deviation: 10 degrees   Ulnar deviation: 30 degrees     Right Thumb   Flexion     MP: 10    DIP: 20  Palmar Abduction    CMC: 50  Radial Abduction    CMC: 50  Opposition: Opposes to tip of IF    Tests     Additional Tests Details  SW 3 61 thumb R, 2 83 all other digits    Swelling     Left Wrist/Hand   Circumference MCP: 19 8 cm    Right Wrist/Hand   Circumference MCP: 20 1 cm             Precautions: Weilby Post op Protocol     Precautions Maintain webspace, no CMC Adduction x 6wks  -2 wks - Comfort Cool    Begin ROM, edema control, scar management , fine motor   -6wks - begin strengthening     HEP: Wrist, Thumb AROM      Manuals             Scar mobs                                                    Neuro Re-Ed             translation             Opposition             Thumb hops             In hand manip 3 planes, no adduction                                                   Ther Ex                                                                                                                     Ther Activity                                       Gait Training                                       Modalities

## 2022-11-14 DIAGNOSIS — T78.40XD ALLERGIC DISORDER, SUBSEQUENT ENCOUNTER: ICD-10-CM

## 2022-11-15 ENCOUNTER — OFFICE VISIT (OUTPATIENT)
Dept: OCCUPATIONAL THERAPY | Age: 82
End: 2022-11-15

## 2022-11-15 DIAGNOSIS — M18.11 PRIMARY OSTEOARTHRITIS OF FIRST CARPOMETACARPAL JOINT OF RIGHT HAND: Primary | ICD-10-CM

## 2022-11-15 RX ORDER — MONTELUKAST SODIUM 10 MG/1
TABLET ORAL
Qty: 90 TABLET | Refills: 1 | Status: SHIPPED | OUTPATIENT
Start: 2022-11-15

## 2022-11-15 NOTE — PROGRESS NOTES
Daily Note     Today's date: 11/15/2022  Patient name: Katharine Velarde  : 1940  MRN: [id number]  Referring provider: Rajani Sanford PA-C  Dx:   Encounter Diagnosis     ICD-10-CM    1  Primary osteoarthritis of first carpometacarpal joint of right hand  M18 11                   Subjective: "I feel it working"      Objective: See treatment diary below      Assessment: 2/10 pain with activities; tolerated well overall, swelling and ROM grossly improved      Plan: Continue per plan of care  Precautions: Weilby Post op Protocol     Precautions Maintain webspace, no CMC Adduction x 6wks  -2 wks - Comfort Cool    Begin ROM, edema control, scar management , fine motor   -6wks - begin strengthening     HEP: Wrist, Thumb AROM      Manuals 1115am            Scar mobs 5            MEM 15                                      Neuro Re-Ed             translation Lrg, med beads, coins            Opposition Lrg, Med beads, coins            Thumb hops No adduction            In hand manip 3 planes, no adduction            Wrist maze 5x                                      Ther Ex                                                                                                                     Ther Activity                                       Gait Training                                       Modalities             MHP 5

## 2022-11-17 ENCOUNTER — OFFICE VISIT (OUTPATIENT)
Dept: OCCUPATIONAL THERAPY | Age: 82
End: 2022-11-17

## 2022-11-17 DIAGNOSIS — M18.11 PRIMARY OSTEOARTHRITIS OF FIRST CARPOMETACARPAL JOINT OF RIGHT HAND: Primary | ICD-10-CM

## 2022-11-17 NOTE — PROGRESS NOTES
Daily Note     Today's date: 2022  Patient name: Hao Wilson  : 1940  MRN: [id number]  Referring provider: Brinda Owen PA-C  Dx:   Encounter Diagnosis     ICD-10-CM    1  Primary osteoarthritis of first carpometacarpal joint of right hand  M18 11                      Subjective: "It's not bad"      Objective: See treatment diary below      Assessment: Tolerated well, AROM and swelling; no pain with activities    Plan: Continue per plan of care  Precautions: Weilby Post op Protocol     Precautions Maintain webspace, no CMC Adduction x 6wks  -2 wks - Comfort Cool    Begin ROM, edema control, scar management , fine motor   -6wks - begin strengthening     HEP: Wrist, Thumb AROM      Manuals 11/15 11/17           Scar mobs 5 5           MEM 15                                      Neuro Re-Ed             translation Lrg, med beads, coins  coins x 50           Opposition Lrg, Med beads, coins Med manny x 35           Thumb hops No adduction No add, 1 inch x 35           In hand manip 3 planes, no adduction            Wrist maze 5x 10           Wall walking  10                        Ther Ex                                                                                                                     Ther Activity                                       Gait Training                                       Modalities             MHP 5 5

## 2022-11-21 ENCOUNTER — OFFICE VISIT (OUTPATIENT)
Dept: OCCUPATIONAL THERAPY | Age: 82
End: 2022-11-21

## 2022-11-21 DIAGNOSIS — M18.11 PRIMARY OSTEOARTHRITIS OF FIRST CARPOMETACARPAL JOINT OF RIGHT HAND: Primary | ICD-10-CM

## 2022-11-21 NOTE — PROGRESS NOTES
Daily Note     Today's date: 2022  Patient name: Karla Romo  : 1940  MRN: [id number]  Referring provider: Marietta Hill PA-C  Dx:   Encounter Diagnosis     ICD-10-CM    1  Primary osteoarthritis of first carpometacarpal joint of right hand  M18 11                      Subjective: "No pain"      Objective: See treatment diary below      Assessment: Doing well, ~80% thumb AROM with no pain  Plan: Continue per plan of care  Precautions: Weilby Post op Protocol     Precautions Maintain webspace, no CMC Adduction x 6wks  -2 wks - Comfort Cool    Begin ROM, edema control, scar management , fine motor   -6wks - begin strengthening     HEP: Wrist, Thumb AROM      Manuals 11/15 11/17 11/21          Scar mobs 5 5 15          MEM 15                                      Neuro Re-Ed             translation Lrg, med beads, coins  coins x 50 KP x 1 (3)          Opposition Lrg, Med beads, coins Med manny x 35 Med/small beads x 45          Thumb hops No adduction No add, 1 inch x 35           In hand manip 3 planes, no adduction  3 planes no add          Wrist maze 5x 10 10          Wall walking  10                        Ther Ex                                                                                                                     Ther Activity                                       Gait Training                                       Modalities             [facility] 5 5

## 2022-11-23 ENCOUNTER — OFFICE VISIT (OUTPATIENT)
Dept: OCCUPATIONAL THERAPY | Age: 82
End: 2022-11-23

## 2022-11-23 DIAGNOSIS — M18.11 PRIMARY OSTEOARTHRITIS OF FIRST CARPOMETACARPAL JOINT OF RIGHT HAND: Primary | ICD-10-CM

## 2022-11-23 NOTE — PROGRESS NOTES
Daily Note     Today's date: 2022  Patient name: David Khan  : 1940  MRN: [id number]  Referring provider: Jerrod Cee PA-C  Dx:   Encounter Diagnosis     ICD-10-CM    1  Primary osteoarthritis of first carpometacarpal joint of right hand  M18 11                      Subjective: "Just feels like it worked"      Objective: See treatment diary below      Assessment: Further improving AROM, making good progress    Plan: Progress treament per protocol  Precautions: Weilby Post op Protocol     Precautions Maintain webspace, no CMC Adduction x 6wks  -2 wks - Comfort Cool    Begin ROM, edema control, scar management , fine motor   -6wks - begin strengthening     HEP: Wrist, Thumb AROM      Manuals 11/15 11/17 11/21 11/23         Scar mobs 5 5 15 10         MEM 15                                      Neuro Re-Ed             translation Lrg, med beads, coins  coins x 50 KP x 1 (3) Kp (3), coins x 25         Opposition Lrg, Med beads, coins Med manny x 35 Med/small beads x 45 coins x 25, marble roll x 15         Thumb hops No adduction No add, 1 inch x 35           In hand manip 3 planes, no adduction  3 planes no add          Wrist maze 5x 10 10          Wall walking  10  10                      Ther Ex                                                                                                                     Ther Activity                                       Gait Training                                       Modalities             P 5 5

## 2022-11-28 ENCOUNTER — OFFICE VISIT (OUTPATIENT)
Dept: OCCUPATIONAL THERAPY | Age: 82
End: 2022-11-28

## 2022-11-28 DIAGNOSIS — M18.11 PRIMARY OSTEOARTHRITIS OF FIRST CARPOMETACARPAL JOINT OF RIGHT HAND: Primary | ICD-10-CM

## 2022-11-28 NOTE — PROGRESS NOTES
Daily Note     Today's date: 2022  Patient name: Franki Zhao  : 1940  MRN: [id number]  Referring provider: Ghada Castro PA-C  Dx:   Encounter Diagnosis     ICD-10-CM    1  Primary osteoarthritis of first carpometacarpal joint of right hand  M18 11                      Subjective: "It's fine"      Objective: See treatment diary below      Assessment: Dexterity and AROM improving, no c/o pain    Plan: Continue per plan of care  Precautions: Weilby Post op Protocol     Precautions Maintain webspace, no CMC Adduction x 6wks  -2 wks - Comfort Cool    Begin ROM, edema control, scar management , fine motor   -6wks - begin strengthening     HEP: Wrist, Thumb AROM      Manuals 11/15 11/17 11/21 11/23 11/28        Scar mobs 5 5 15 10 10        MEM 15                                      Neuro Re-Ed             translation Lrg, med beads, coins  coins x 50 KP x 1 (3) Kp (3), coins x 25 KP (5), coins x 25        Opposition Lrg, Med beads, coins Med manny x 35 Med/small beads x 45 coins x 25, marble roll x 15 coins x 25, marble roll x 15        Thumb hops No adduction No add, 1 inch x 35           In hand manip 3 planes, no adduction  3 planes no add  3 planes no add        Wrist maze 5x 10 10          Wall walking  10  10 10                     Ther Ex                                                                                                                     Ther Activity                                       Gait Training                                       Modalities             P 5 5   5

## 2022-11-30 ENCOUNTER — OFFICE VISIT (OUTPATIENT)
Dept: OCCUPATIONAL THERAPY | Age: 82
End: 2022-11-30

## 2022-11-30 DIAGNOSIS — M18.11 PRIMARY OSTEOARTHRITIS OF FIRST CARPOMETACARPAL JOINT OF RIGHT HAND: Primary | ICD-10-CM

## 2022-11-30 NOTE — PROGRESS NOTES
Daily Note     Today's date: 2022  Patient name: Stephanie White  : 1940  MRN: [id number]  Referring provider: Liz Tsang PA-C  Dx:   Encounter Diagnosis     ICD-10-CM    1  Primary osteoarthritis of first carpometacarpal joint of right hand  M18 11                      Subjective: "Feels fine"      Objective: See treatment diary below      Assessment: Great tolerance, no c/o pain with upgrades    Plan: Progress treament per protocol  Precautions: Weilby Post op Protocol     Precautions Maintain webspace, no CMC Adduction x 6wks  -2 wks - Comfort Cool    Begin ROM, edema control, scar management , fine motor   -6wks - begin strengthening     HEP: Wrist, Thumb AROM      Manuals 11/15 11/17 11/21 11/23 11/28 11/30       Scar mobs 5 5 15 10 10 10       MEM 15                                      Neuro Re-Ed             translation Lrg, med beads, coins  coins x 50 KP x 1 (3) Kp (3), coins x 25 KP (5), coins x 25 KP (5), coins x 3           Opposition Lrg, Med beads, coins Med manny x 35 Med/small beads x 45 coins x 25, marble roll x 15 coins x 25, marble roll x 15        Thumb hops No adduction No add, 1 inch x 35           In hand manip 3 planes, no adduction  3 planes no add  3 planes no add        Wrist maze 5x 10 10          Wall walking  10  10 10        EDC      sm rb x 20       Ther Ex             Pinch/grasp      YPW x 20, cylindrical grasp x 20       Pinch [place]      TT/Key RR 1x each       WF/WE      YPB 3x10                                                                        Ther Activity                                       Gait Training                                       Modalities             [facility] 5 5   5

## 2022-12-05 ENCOUNTER — OFFICE VISIT (OUTPATIENT)
Dept: FAMILY MEDICINE CLINIC | Facility: CLINIC | Age: 82
End: 2022-12-05

## 2022-12-05 VITALS
OXYGEN SATURATION: 96 % | BODY MASS INDEX: 38.49 KG/M2 | RESPIRATION RATE: 16 BRPM | HEART RATE: 69 BPM | TEMPERATURE: 97.6 F | DIASTOLIC BLOOD PRESSURE: 72 MMHG | HEIGHT: 65 IN | SYSTOLIC BLOOD PRESSURE: 112 MMHG | WEIGHT: 231 LBS

## 2022-12-05 DIAGNOSIS — E03.9 HYPOTHYROIDISM, UNSPECIFIED TYPE: ICD-10-CM

## 2022-12-05 DIAGNOSIS — N18.30 STAGE 3 CHRONIC KIDNEY DISEASE, UNSPECIFIED WHETHER STAGE 3A OR 3B CKD (HCC): ICD-10-CM

## 2022-12-05 DIAGNOSIS — D75.89 MACROCYTOSIS WITHOUT ANEMIA: ICD-10-CM

## 2022-12-05 DIAGNOSIS — I10 HYPERTENSION, UNSPECIFIED TYPE: Primary | ICD-10-CM

## 2022-12-05 DIAGNOSIS — N32.81 OVERACTIVE BLADDER: ICD-10-CM

## 2022-12-05 NOTE — ASSESSMENT & PLAN NOTE
Lab Results   Component Value Date    EGFR 60 06/24/2022    EGFR 59 04/02/2021    EGFR 61 07/22/2020    CREATININE 0 90 06/24/2022    CREATININE 0 93 04/02/2021    CREATININE 0 90 07/22/2020     Based on most recent blood work, renal function stable  Will repeat CMP/BMP in the next 6 months

## 2022-12-05 NOTE — ASSESSMENT & PLAN NOTE
Blood pressure today 112/72   Goal blood pressure should be less than 150/90  Patient's blood pressure at goal  Continue current medication including losartan 50 mg daily

## 2022-12-05 NOTE — PROGRESS NOTES
Name: Honey Boeck      : 1940      MRN: [id number]  Encounter Provider: Jed Holter, MD  Encounter Date: 2022   Encounter department: [address] [place] [address]     1  Hypertension, unspecified type  Assessment & Plan:  Blood pressure today 112/72   Goal blood pressure should be less than 150/90  Patient's blood pressure at goal  Continue current medication including losartan 50 mg daily      2  Stage 3 chronic kidney disease, unspecified whether stage 3a or 3b CKD ([facility] [address] )  Assessment & Plan:  Lab Results   Component Value Date    EGFR 60 2022    EGFR 59 2021    EGFR 61 2020    CREATININE 0 90 2022    CREATININE 0 93 2021    CREATININE 0 90 2020     Based on most recent blood work, renal function stable  Will repeat CMP/BMP in the next 6 months      3  Overactive bladder  Assessment & Plan:  Continue oxybutynin for management      4  Hypothyroidism, unspecified type  Assessment & Plan:  Continue levothyroxine 125 mcg daily      5  Macrocytosis without anemia  Assessment & Plan:  Evidence of persistent microcytosis without anemia since 2017  Obtain vitamin B12 for evaluation    Orders:  -     Vitamin B12; Future         Subjective      HPI     [de-identified]Year old female patient presents for follow-up regarding her chronic medical conditions  Patient was last seen on 2022 for a rash, this has resolved  Patient was seen for her annual Medicare wellness on 2022, discussed hypertension, hypothyroidism, states 3 CKD and dyslipidemia  Most recent blood work from September and  discussed with patient  Blood pressure today is 112/72  Patient has been compliant with medication  Patient tick denies any history of vitamin-B deficiency  It appears the patient had increased MCV all the way back until 2017  Baseline kidney function has been fluctuating between states 2-3 CKD    Denies any significant concerns today    Review of Systems Constitutional: Negative for chills and fever  HENT: Negative for congestion, rhinorrhea and sore throat  Head cold resolved   Respiratory: Negative for shortness of breath  Cardiovascular: Negative for chest pain  Gastrointestinal: Negative for abdominal pain  Genitourinary:        Has difficulty with overactive bladder, takes oxybutynin   Musculoskeletal:        Right hand in a splint  Most recent surgery for right hand, arthritis/tendinitis  Has follow-up coming up   Neurological: Negative for headaches  Psychiatric/Behavioral: Positive for sleep disturbance (baseline issue, since her )  Current Outpatient Medications on File Prior to Visit   Medication Sig   • acetaminophen (TYLENOL) 650 mg CR tablet Take 1 tablet (650 mg total) by mouth every 8 (eight) hours as needed for mild pain   • Aspirin (ASPIR-81 PO) Aspir-81 81 MG Oral Tablet Delayed Release  TAKE 1 TABLET (81 MG TOTAL) BY MOUTH DAILY  Refills: 0       Derek Fair DO;  Started 27-May-2016  Active   • Biotin 1 MG CAPS Biotin 1 MG Oral Capsule   Refills: 0       Derek Fair DO;  Started 27-May-2016  Active   • Cholecalciferol (VITAMIN D3 PO) Take by mouth   • Coral Calcium 1000 (390 Ca) MG TABS Coral Calcium 500 MG Oral Tablet   Refills: 0       Derek Fair DO;  Started 27-May-2016  Active   • docusate sodium (COLACE) 100 mg capsule Take by mouth 3 (three) times a day   • doxylamine (UNISON) 25 MG tablet daily at bedtime as needed   • Famotidine (PEPCID PO) Take 20 mg by mouth 2 (two) times a day     • levothyroxine 125 mcg tablet TAKE 1 TABLET DAILY   • losartan (COZAAR) 50 mg tablet TAKE 1 TABLET DAILY   • meloxicam (MOBIC) 15 mg tablet Take 1 tablet (15 mg total) by mouth daily as needed for moderate pain   • metoprolol succinate (TOPROL-XL) 50 mg 24 hr tablet TAKE 1 TABLET DAILY   HOLD FOR HEART RATE LESS THAN 50 BEATS PER MINUTE (Patient taking differently: daily at bedtime)   • montelukast (SINGULAIR) 10 mg tablet TAKE 1 TABLET DAILY   • Multiple Vitamins-Minerals (MULTIVITAMIN ADULT PO) Take by mouth   • Omega-3 1000 MG CAPS Take by mouth   • oxybutynin (DITROPAN-XL) 10 MG 24 hr tablet TAKE 1 TABLET DAILY AT BEDTIME   • Psyllium (METAMUCIL FIBER PO) Take by mouth   • Red Yeast Rice 600 MG CAPS Take by mouth   • [DISCONTINUED] Naproxen Sodium 220 MG CAPS Take 1 capsule (220 mg total) by mouth 2 (two) times a day   • [DISCONTINUED] methylPREDNISolone 4 MG tablet therapy pack Use as directed on package       Objective     /72 (BP Location: Left arm, Patient Position: Sitting, Cuff Size: Large)   Pulse 69   Temp 97 6 °F (36 4 °C) (Temporal)   Resp 16   Ht 5' 5" (1 651 m)   Wt 105 kg (231 lb)   SpO2 96%   BMI 38 44 kg/m²      Physical Exam  Vitals reviewed  Constitutional:       General: She is not in acute distress  Appearance: Normal appearance  She is obese  She is not ill-appearing, toxic-appearing or diaphoretic  Cardiovascular:      Rate and Rhythm: Normal rate  Pulses: Normal pulses  Heart sounds: Normal heart sounds  No murmur heard  Pulmonary:      Effort: Pulmonary effort is normal  No respiratory distress  Breath sounds: Normal breath sounds  Abdominal:      General: Bowel sounds are normal  There is no distension  Palpations: Abdomen is soft  Musculoskeletal:         General: No swelling or deformity  Normal range of motion  Comments: Right arm in a splint   Skin:     General: Skin is warm and dry  Capillary Refill: Capillary refill takes less than 2 seconds  Coloration: Skin is not jaundiced  Neurological:      General: No focal deficit present  Mental Status: She is alert     Psychiatric:         Mood and Affect: Mood normal           Jed Holter, MD

## 2022-12-06 ENCOUNTER — OFFICE VISIT (OUTPATIENT)
Dept: OCCUPATIONAL THERAPY | Age: 82
End: 2022-12-06

## 2022-12-06 DIAGNOSIS — M18.11 PRIMARY OSTEOARTHRITIS OF FIRST CARPOMETACARPAL JOINT OF RIGHT HAND: Primary | ICD-10-CM

## 2022-12-06 NOTE — PROGRESS NOTES
Daily Note     Today's date: 2022  Patient name: Alban Fothergill  : 1940  MRN: [id number]  Referring provider: Lydia Gant PA-C  Dx:   Encounter Diagnosis     ICD-10-CM    1  Primary osteoarthritis of first carpometacarpal joint of right hand  M18 11                      Subjective: "Feels fine"      Objective: See treatment diary below      Assessment: Tolerated well with no pain, upgrades progressing    Plan: Continue per plan of care  Precautions: Weilby Post op Protocol     Precautions Maintain webspace, no CMC Adduction x 6wks  -2 wks - Comfort Cool    Begin ROM, edema control, scar management , fine motor   -6wks - begin strengthening     HEP: Wrist, Thumb AROM, theraputty pinch/grasp      Manuals 11/15 11/17 11/21 11/23 11/28 11/30 12/6      Scar mobs 5 5 15 10 10 10       MEM 15                                      Neuro Re-Ed             translation Lrg, med beads, coins  coins x 50 KP x 1 (3) Kp (3), coins x 25 KP (5), coins x 25 KP (5), coins x 3     KP      Opposition Lrg, Med beads, coins Med manny x 35 Med/small beads x 45 coins x 25, marble roll x 15 coins x 25, marble roll x 15  sm textured marble roll x 30      Thumb hops No adduction No add, 1 inch x 35           In hand manip 3 planes, no adduction  3 planes no add  3 planes no add  2# BALL 3 planes no ab      Wrist maze 5x 10 10          Wall walking  10  10 10        EDC      sm rb x 20 sm rb x 30      Ther Ex             Pinch/grasp      YPW x 20, cylindrical grasp x 20 YPW x 20, cylindrical grasp x 30      Pinch [place]      TT/Key RR 1x each TT/Key RR 1x each      WF/WE      YPB 3x10 YPB 3x10                                                                       Ther Activity                                       Gait Training                                       Modalities             [facility] 5 5   5  5

## 2022-12-09 ENCOUNTER — OFFICE VISIT (OUTPATIENT)
Dept: OCCUPATIONAL THERAPY | Age: 82
End: 2022-12-09

## 2022-12-09 DIAGNOSIS — M18.11 PRIMARY OSTEOARTHRITIS OF FIRST CARPOMETACARPAL JOINT OF RIGHT HAND: Primary | ICD-10-CM

## 2022-12-09 NOTE — PROGRESS NOTES
Daily Note     Today's date: 2022  Patient name: Brandi Gill  : 1940  MRN: [id number]  Referring provider: Cammie Donald PA-C  Dx:   Encounter Diagnosis     ICD-10-CM    1  Primary osteoarthritis of first carpometacarpal joint of right hand  M18 11                      Subjective: "It's sore"      Objective: See treatment diary below      Assessment: Doing well, 1/10 pain with some activities but overall doing well  Plan: Continue per plan of care  Precautions: Weilby Post op Protocol     Precautions Maintain webspace, no CMC Adduction x 6wks  -2 wks - Comfort Cool    Begin ROM, edema control, scar management , fine motor   -6wks - begin strengthening     HEP: Wrist, Thumb AROM, theraputty pinch/grasp      Manuals 11/15 11/17 11/21 11/23 11/28 11/30 12/6 12/9     Scar mobs 5 5 15 10 10 10  10     MEM 15                                      Neuro Re-Ed             translation Lrg, med beads, coins  coins x 50 KP x 1 (3) Kp (3), coins x 25 KP (5), coins x 25 KP (5), coins x 3     KP kp     Opposition Lrg, Med beads, coins Med manny x 35 Med/small beads x 45 coins x 25, marble roll x 15 coins x 25, marble roll x 15  sm textured marble roll x 30 sm textured marble roll x 30     Thumb hops No adduction No add, 1 inch x 35           In hand manip 3 planes, no adduction  3 planes no add  3 planes no add  2# BALL 3 planes no ab tbL 3 planes no ab     Wrist maze 5x 10 10          Wall walking  10  10 10        EDC      sm rb x 20 sm rb x 30 sm rb x30     Ther Ex             Pinch/grasp      YPW x 20, cylindrical grasp x 20 YPW x 20, cylindrical grasp x 30 YPW x 20, cylindrical grasp x 30     Pinch [place]      TT/Key RR 1x each TT/Key RR 1x each TT/Key RR 1x each     WF/WE      YPB 3x10 YPB 3x10 YPB 3x10                                                                      Ther Activity                                       Gait Training                                       Modalities [facility] 5 5   5  5

## 2022-12-12 ENCOUNTER — EVALUATION (OUTPATIENT)
Dept: OCCUPATIONAL THERAPY | Age: 82
End: 2022-12-12

## 2022-12-12 DIAGNOSIS — M18.11 PRIMARY OSTEOARTHRITIS OF FIRST CARPOMETACARPAL JOINT OF RIGHT HAND: Primary | ICD-10-CM

## 2022-12-12 NOTE — PROGRESS NOTES
Daily Note     Today's date: 2022  Patient name: Hao Wilson  : 1940  MRN: [id number]  Referring provider: Brinda Owen PA-C  Dx:   Encounter Diagnosis     ICD-10-CM    1  Primary osteoarthritis of first carpometacarpal joint of right hand  M18 11                      Subjective: "It feels fine"      Objective: See treatment diary below      Assessment: See RE    Plan: Progress treament per protocol  Precautions: Weilby Post op Protocol     Precautions Maintain webspace, no CMC Adduction x 6wks  -2 wks - Comfort Cool    Begin ROM, edema control, scar management , fine motor   -6wks - begin strengthening     HEP: Wrist, Thumb AROM, theraputty pinch/grasp      Manuals 11/15 11/17 11/21 11/23 11/28 11/30 12/6 12/9 12/12    Scar mobs 5 5 15 10 10 10  10     MEM 15                                      Neuro Re-Ed             translation Lrg, med beads, coins  coins x 50 KP x 1 (3) Kp (3), coins x 25 KP (5), coins x 25 KP (5), coins x 3     KP kp     Opposition Lrg, Med beads, coins Med manny x 35 Med/small beads x 45 coins x 25, marble roll x 15 coins x 25, marble roll x 15  sm textured marble roll x 30 sm textured marble roll x 30     Thumb hops No adduction No add, 1 inch x 35           In hand manip 3 planes, no adduction  3 planes no add  3 planes no add  2# BALL 3 planes no ab tbL 3 planes no ab     Wrist maze 5x 10 10          Wall walking  10  10 10        EDC      sm rb x 20 sm rb x 30 sm rb x30 lrg rb x20    Ther Ex             Pinch/grasp      YPW x 20, cylindrical grasp x 20 YPW x 20, cylindrical grasp x 30 YPW x 20, cylindrical grasp x 30 GPW x 30, cylindrical grasp x 30    Pinch [place]      TT/Key RR 1x each TT/Key RR 1x each TT/Key RR 1x each TT/Key RG 1x each    WF/WE/S/P      YPB 3x10 YPB 3x10 YPB 3x10 RPB 3x10                                                                     Ther Activity                                       Gait Training Modalities             [facility] 5 5   5  5

## 2022-12-12 NOTE — PROGRESS NOTES
OT Re-Evaluation     Today's date: 2022  Patient name: Mame Poe  : 1940  MRN: [id number]  Referring provider: Jaycee Ormond, PA-C  Dx:   Encounter Diagnosis     ICD-10-CM    1  Primary osteoarthritis of first carpometacarpal joint of right hand  M18 11                      Assessment  Assessment details: David Jimenez is now 6 weeks s/p thumb artroplasty  She has good AROM and well controlled pain, but weakness post-surgical  Recommend continued tx  Goals  STG) Motion increased by 25% in 4-6 weeks MET  STG) SwellingEdema improved by 25% in 4-6 weeks MET  STG) Pain decreased by 25% in 4-6 weeks MET    STG) Pinch and  strength improved 20% in 2 weeks  LTG) ADL and IADL skills improved   LTG) School/Work skills improved  LTG) Leisure skills improved    Patient Goals: To get full use of her hand without pain; is a quilter      Goals, plan of care and treatment condition discussed with patient  Patient expresses their understanding and questions regarding these issues were addressed  Plan  Patient would benefit from: OT eval and custom splinting  Planned modality interventions: TENS, thermotherapy: hydrocollator packs, thermotherapy: paraffin bath and ultrasound  Planned therapy interventions: neuromuscular re-education, motor coordination training, manual therapy, joint mobilization, Marquez taping, strengthening, stretching, therapeutic activities, therapeutic exercise, functional ROM exercises, fine motor coordination training and orthotic fitting/training  Frequency: 2x week  Duration in visits: 10  Treatment plan discussed with: patient        Subjective Evaluation    History of Present Illness  Mechanism of injury: 10/25 R interposition arthroplasty + 1st dorsal extensor compartment release; reports hx for several years    Pain  Current pain ratin  At worst pain ratin    Hand dominance: right          Objective     Active Range of Motion     Right Wrist   Wrist flexion: 50 degrees   Wrist extension: 55 degrees   Radial deviation: 20 degrees   Ulnar deviation: 30 degrees     Right Thumb   Flexion     MP: 35    DIP: 60  Palmar Abduction    CMC: 50  Radial Abduction    CMC: 50  Opposition: Opposes to tip of SF    Strength/Myotome Testing     Left Wrist/Hand      (2nd hand position)     Trial 1: 42 8    Thumb Strength  Key/Lateral Pinch     Trial 1: 13  Palmar/Three-Point Pinch     Trial 1: 10    Right Wrist/Hand      (2nd hand position)     Trial 1: 22 2    Thumb Strength   Key/Lateral Pinch     Trial 1: 6  Palmar/Three-Point Pinch     Trial 1: 5    Additional Strength Details  Pain free with test      Swelling     Left Wrist/Hand   Circumference MCP: 20 2 cm    Right Wrist/Hand   Circumference MCP: 18 6 cm             Precautions: Weilby Post op Protocol     Precautions Maintain webspace, no CMC Adduction x 6wks  -2 wks - Comfort Cool    Begin ROM, edema control, scar management , fine motor   -6wks - begin strengthening     HEP: Wrist, Thumb AROM      Manuals             Scar mobs                                                    Neuro Re-Ed             translation             Opposition             Thumb hops             In hand manip 3 planes, no adduction                                                   Ther Ex                                                                                                                     Ther Activity                                       Gait Training                                       Modalities

## 2022-12-14 ENCOUNTER — OFFICE VISIT (OUTPATIENT)
Dept: OBGYN CLINIC | Facility: HOSPITAL | Age: 82
End: 2022-12-14

## 2022-12-14 VITALS
BODY MASS INDEX: 38.49 KG/M2 | HEIGHT: 65 IN | SYSTOLIC BLOOD PRESSURE: 173 MMHG | HEART RATE: 67 BPM | DIASTOLIC BLOOD PRESSURE: 78 MMHG | WEIGHT: 231 LBS

## 2022-12-14 DIAGNOSIS — M18.11 PRIMARY OSTEOARTHRITIS OF FIRST CARPOMETACARPAL JOINT OF RIGHT HAND: Primary | ICD-10-CM

## 2022-12-14 NOTE — PROGRESS NOTES
Assessment:   S/P Right thumb interpositional arthroplasty - Right, Right de Quervain release - Right, and Application short-arm thumb spica splint - Right on 10/25/2022    Plan:   Patient was advised to continue with therapy to work on her range of motion  She can discuss with her therapist to decreasing the number of visits or transitioning to home exercises  She was advised to slowly wean herself out of the brace but continue to use as she continues to strengthen the thumb  She will follow-up with us in 6 weeks for reevaluation    Follow Up:  6 weeks    To Do Next Visit:  Reevaluation range of motion      CHIEF COMPLAINT:  Chief Complaint   Patient presents with   • Right Hand - Post-op     S/P Right thumb interpositional arthroplasty - Right de Quervain release - 10/25/2022         SUBJECTIVE:  Segundo Potter is a [de-identified] y o  female who presents for follow up after Right thumb interpositional arthroplasty - Right, Right de Quervain release - Right, and Application short-arm thumb spica splint - Right on 10/25/2022  Today patient has minimal pain and discomfort  She states that she started working on strengthening         PHYSICAL EXAMINATION:  Vital signs: BP (!) 173/78   Pulse 67   Ht 5' 5" (1 651 m)   Wt 105 kg (231 lb)   BMI 38 44 kg/m²   General: well developed and well nourished, alert, oriented times 3 and appears comfortable  Psychiatric: Normal    MUSCULOSKELETAL EXAMINATION:  Incision: clean, dry and healed  Range of Motion: As expected, opposition intact and full composite fist possible  Neurovascular status: Neuro intact, good cap refill  Activity Restrictions: No restrictions         STUDIES REVIEWED:  No Studies to review      PROCEDURES PERFORMED:  Procedures  No Procedures performed today

## 2022-12-15 ENCOUNTER — APPOINTMENT (OUTPATIENT)
Dept: OCCUPATIONAL THERAPY | Age: 82
End: 2022-12-15

## 2022-12-19 ENCOUNTER — OFFICE VISIT (OUTPATIENT)
Dept: OCCUPATIONAL THERAPY | Age: 82
End: 2022-12-19

## 2022-12-19 DIAGNOSIS — I10 ESSENTIAL HYPERTENSION: ICD-10-CM

## 2022-12-19 DIAGNOSIS — E03.9 HYPOTHYROIDISM, UNSPECIFIED TYPE: ICD-10-CM

## 2022-12-19 DIAGNOSIS — N32.81 OVERACTIVE BLADDER: ICD-10-CM

## 2022-12-19 DIAGNOSIS — M18.11 PRIMARY OSTEOARTHRITIS OF FIRST CARPOMETACARPAL JOINT OF RIGHT HAND: Primary | ICD-10-CM

## 2022-12-19 RX ORDER — LEVOTHYROXINE SODIUM 0.12 MG/1
TABLET ORAL
Qty: 90 TABLET | Refills: 1 | Status: SHIPPED | OUTPATIENT
Start: 2022-12-19

## 2022-12-19 RX ORDER — METOPROLOL SUCCINATE 50 MG/1
TABLET, EXTENDED RELEASE ORAL
Qty: 90 TABLET | Refills: 1 | Status: SHIPPED | OUTPATIENT
Start: 2022-12-19

## 2022-12-19 RX ORDER — OXYBUTYNIN CHLORIDE 10 MG/1
TABLET, EXTENDED RELEASE ORAL
Qty: 90 TABLET | Refills: 1 | Status: SHIPPED | OUTPATIENT
Start: 2022-12-19

## 2022-12-19 NOTE — PROGRESS NOTES
Daily Note     Today's date: 2022  Patient name: Agnes Benitez  : 1940  MRN: [id number]  Referring provider: Jef Johnson PA-C  Dx:   Encounter Diagnosis     ICD-10-CM    1  Primary osteoarthritis of first carpometacarpal joint of right hand  M18 11                      Subjective: "Just tired"      Objective: See treatment diary below      Assessment: Tolerated well, some pain with keypinch     Plan: Progress treament per protocol  Precautions: Weilby Post op Protocol     Precautions Maintain webspace, no CMC Adduction x 6wks  -2 wks - Comfort Cool    Begin ROM, edema control, scar management , fine motor   -6wks - begin strengthening     HEP: Wrist, Thumb AROM, theraputty pinch/grasp      Manuals 11/15 11/17 11/21 11/23 11/28 11/30 12/6 12/9 12/12 12/19   Scar mobs 5 5 15 10 10 10  10  10   MEM 15                                      Neuro Re-Ed             translation Lrg, med beads, coins  coins x 50 KP x 1 (3) Kp (3), coins x 25 KP (5), coins x 25 KP (5), coins x 3     KP kp     Opposition Lrg, Med beads, coins Med manny x 35 Med/small beads x 45 coins x 25, marble roll x 15 coins x 25, marble roll x 15  sm textured marble roll x 30 sm textured marble roll x 30     Thumb hops No adduction No add, 1 inch x 35           In hand manip 3 planes, no adduction  3 planes no add  3 planes no add  2# BALL 3 planes no ab tbL 3 planes no ab  tbL 3 planes no ab   Wrist maze 5x 10 10          Wall walking  10  10 10        EDC      sm rb x 20 sm rb x 30 sm rb x30 lrg rb x20 lrg rb x 30   Ther Ex             Pinch/grasp      YPW x 20, cylindrical grasp x 20 YPW x 20, cylindrical grasp x 30 YPW x 20, cylindrical grasp x 30 GPW x 30, cylindrical grasp x 30 GPW x 30, cylindrical grasp x 30   Pinch [place]      TT/Key RR 1x each TT/Key RR 1x each TT/Key RR 1x each TT/Key RG 1x each TT/Key RG 1x each   WF/WE/S/P      YPB 3x10 YPB 3x10 YPB 3x10 RPB 3x10 rpb 3x10 Ther Activity                                       Gait Training                                       Modalities             [facility] 5 5   5  5

## 2022-12-21 ENCOUNTER — OFFICE VISIT (OUTPATIENT)
Dept: OCCUPATIONAL THERAPY | Age: 82
End: 2022-12-21

## 2022-12-21 DIAGNOSIS — M18.11 PRIMARY OSTEOARTHRITIS OF FIRST CARPOMETACARPAL JOINT OF RIGHT HAND: Primary | ICD-10-CM

## 2022-12-21 NOTE — PROGRESS NOTES
Daily Note     Today's date: 2022  Patient name: Berry Denton  : 1940  MRN: [id number]  Referring provider: Nusrat Siddiqui PA-C  Dx:   Encounter Diagnosis     ICD-10-CM    1  Primary osteoarthritis of first carpometacarpal joint of right hand  M18 11                      Subjective: "Feels fine!"      Objective: See treatment diary below      Assessment: Tolerated without pain, some fatigue    Plan: Continue per plan of care  Precautions: Weilby Post op Protocol     Precautions Maintain webspace, no CMC Adduction x 6wks  -2 wks - Comfort Cool    Begin ROM, edema control, scar management , fine motor   -6wks - begin strengthening     HEP: Wrist, Thumb AROM, theraputty pinch/grasp      Manuals    Scar mobs  5 15 10 10 10  10  10   MEM                                       Neuro Re-Ed             translation kp  coins x 50 KP x 1 (3) Kp (3), coins x 25 KP (5), coins x 25 KP (5), coins x 3     KP kp     Opposition  Med manny x 35 Med/small beads x 45 coins x 25, marble roll x 15 coins x 25, marble roll x 15  sm textured marble roll x 30 sm textured marble roll x 30     Thumb hops  No add, 1 inch x 35           In hand manip   3 planes no add  3 planes no add  2# BALL 3 planes no ab tbL 3 planes no ab  tbL 3 planes no ab   Wrist maze  10 10          Wall walking  10  10 10        EDC lrg rb x 30     sm rb x 20 sm rb x 30 sm rb x30 lrg rb x20 lrg rb x 30   Ther Ex             Pinch/grasp GPW x 30, cylindrical grasp x 30     YPW x 20, cylindrical grasp x 20 YPW x 20, cylindrical grasp x 30 YPW x 20, cylindrical grasp x 30 GPW x 30, cylindrical grasp x 30 GPW x 30, cylindrical grasp x 30   Pinch [place] TT/Key GG 1x eac     TT/Key RR 1x each TT/Key RR 1x each TT/Key RR 1x each TT/Key RG 1x each TT/Key RG 1x each   WF/WE/S/P rpb 3x10     YPB 3x10 YPB 3x10 YPB 3x10 RPB 3x10 rpb 3x10 Ther Activity                                       Gait Training                                       Modalities             [facility] 5 5   5  5

## 2022-12-23 ENCOUNTER — OFFICE VISIT (OUTPATIENT)
Dept: FAMILY MEDICINE CLINIC | Facility: CLINIC | Age: 82
End: 2022-12-23

## 2022-12-23 ENCOUNTER — NURSE TRIAGE (OUTPATIENT)
Dept: OTHER | Facility: OTHER | Age: 82
End: 2022-12-23

## 2022-12-23 VITALS
RESPIRATION RATE: 16 BRPM | HEIGHT: 65 IN | DIASTOLIC BLOOD PRESSURE: 80 MMHG | TEMPERATURE: 97.6 F | OXYGEN SATURATION: 96 % | SYSTOLIC BLOOD PRESSURE: 126 MMHG | HEART RATE: 76 BPM | BODY MASS INDEX: 38.49 KG/M2 | WEIGHT: 231 LBS

## 2022-12-23 DIAGNOSIS — R04.0 EPISTAXIS: Primary | ICD-10-CM

## 2022-12-23 NOTE — ASSESSMENT & PLAN NOTE
She reports for recurrence of epistaxis that stopped with nasal packing since this week  No history of trauma, no recent illness or infection  Patient is not on any anticoagulation medication  Patient does location using saline nose spray  Pressure patient to monitor symptoms  Use humidifiers during cold dry winter months  Symptom recurs, obtain INR for evaluation

## 2022-12-23 NOTE — TELEPHONE ENCOUNTER
Patient reports 4 nosebleeds within the last week after not having them for 25+ years  She is concerned and would like to be seen today in the office  Appointment made for [phone]  Reason for Disposition  • Patient wants to be seen    Answer Assessment - Initial Assessment Questions  1  AMOUNT OF BLEEDING: "How bad is the bleeding?" "How much blood was lost?" "Has the bleeding stopped?"    - MILD: needed a couple tissues    - MODERATE: needed many tissues    - SEVERE: large blood clots, soaked many tissues, lasted more than 30 minutes       Mild   2  ONSET: "When did the nosebleed start?"       4th one this week   3  FREQUENCY: "How many nosebleeds have you had in the last 24 hours?"       1   4  RECURRENT SYMPTOMS: "Have there been other recent nosebleeds?" If Yes, ask: "How long did it take you to stop the bleeding?" "What worked best?"       Yes, minutes, plug it with tissues   5  CAUSE: "What do you think caused this nosebleed?"      Unaware  6  LOCAL FACTORS: "Do you have any cold symptoms?", "Have you been rubbing or picking at your nose?"      Denies   7  SYSTEMIC FACTORS: "Do you have high blood pressure or any bleeding problems?"      HTN  8  BLOOD THINNERS: "Do you take any blood thinners?" (e g , coumadin, heparin, aspirin, Plavix)      ASA  9  OTHER SYMPTOMS: "Do you have any other symptoms?" (e g , lightheadedness)      Denies   10   PREGNANCY: "Is there any chance you are pregnant?" "When was your last menstrual period?"        N/A    Protocols used: NOSEBLEED-ADULT-OH

## 2022-12-23 NOTE — PROGRESS NOTES
Name: Freddie Hernadez      : 1940      MRN: [id number]  Encounter Provider: Eloise Quinteros MD  Encounter Date: 2022   Encounter department: [address] [place] [address]  Epistaxis  Assessment & Plan:  She reports for recurrence of epistaxis that stopped with nasal packing since this week  No history of trauma, no recent illness or infection  Patient is not on any anticoagulation medication  Patient does location using saline nose spray  Pressure patient to monitor symptoms  Use humidifiers during cold dry winter months  Symptom recurs, obtain INR for evaluation      Orders:  -     Protime-INR; Future         Subjective      HPI     53-year-old female patient presents for evaluation of recurrent epistaxis, 4 times this week  Patient reports previously, she does have a history of nosebleed however, has not had nosebleeding in 20 to 25 years  Has any recent illness, congestion, runny nose, sore throat  No recent injuries or traumas  Last episode of nose bleed the morning of 22  Patient is not on any anticoagulation medication  Review of Systems   Constitutional: Negative for chills and fever  HENT: Positive for nosebleeds  Negative for congestion, sinus pain and sore throat  Respiratory: Negative for shortness of breath  Cardiovascular: Negative for chest pain  Gastrointestinal: Negative for abdominal pain  Neurological: Negative for headaches  Current Outpatient Medications on File Prior to Visit   Medication Sig   • acetaminophen (TYLENOL) 650 mg CR tablet Take 1 tablet (650 mg total) by mouth every 8 (eight) hours as needed for mild pain   • Aspirin (ASPIR-81 PO) Aspir-81 81 MG Oral Tablet Delayed Release  TAKE 1 TABLET (81 MG TOTAL) BY MOUTH DAILY     Refills: 0       Beauty Flakes DO;  Started 27-May-2016  Active   • Biotin 1 MG CAPS Biotin 1 MG Oral Capsule   Refills: 0       Beauty Flakes DO;  Started 27-May-2016  Active   • Cholecalciferol (VITAMIN D3 PO) Take by mouth   • Coral Calcium 1000 (390 Ca) MG TABS Coral Calcium 500 MG Oral Tablet   Refills: 0       Bruno Guy DO;  Started 27-May-2016  Active   • docusate sodium (COLACE) 100 mg capsule Take by mouth 3 (three) times a day   • doxylamine (UNISON) 25 MG tablet daily at bedtime as needed   • Famotidine (PEPCID PO) Take 20 mg by mouth 2 (two) times a day     • levothyroxine 125 mcg tablet TAKE 1 TABLET DAILY   • losartan (COZAAR) 50 mg tablet TAKE 1 TABLET DAILY   • meloxicam (MOBIC) 15 mg tablet Take 1 tablet (15 mg total) by mouth daily as needed for moderate pain   • metoprolol succinate (TOPROL-XL) 50 mg 24 hr tablet TAKE 1 TABLET DAILY  HOLD FOR HEART RATE LESS THAN 50 BEATS PER MINUTE   • montelukast (SINGULAIR) 10 mg tablet TAKE 1 TABLET DAILY   • Multiple Vitamins-Minerals (MULTIVITAMIN ADULT PO) Take by mouth   • Omega-3 1000 MG CAPS Take by mouth   • oxybutynin (DITROPAN-XL) 10 MG 24 hr tablet TAKE 1 TABLET DAILY AT BEDTIME   • Psyllium (METAMUCIL FIBER PO) Take by mouth   • Red Yeast Rice 600 MG CAPS Take by mouth       Objective     /80 (BP Location: Left arm, Patient Position: Sitting, Cuff Size: Large)   Pulse 76   Temp 97 6 °F (36 4 °C) (Temporal)   Resp 16   Ht 5' 5" (1 651 m)   Wt 105 kg (231 lb)   SpO2 96%   BMI 38 44 kg/m²     Physical Exam  Vitals reviewed  Constitutional:       General: She is not in acute distress  Appearance: Normal appearance  She is obese  She is not ill-appearing, toxic-appearing or diaphoretic  HENT:      Nose: No congestion or rhinorrhea  Comments: No evidence of macerated tissue, dried blood noted in the bilateral nostril     Mouth/Throat:      Mouth: Mucous membranes are moist    Eyes:      Pupils: Pupils are equal, round, and reactive to light  Cardiovascular:      Rate and Rhythm: Normal rate and regular rhythm  Pulses: Normal pulses  Heart sounds: Normal heart sounds   No murmur heard   Pulmonary:      Effort: Pulmonary effort is normal  No respiratory distress  Breath sounds: Normal breath sounds  Abdominal:      General: Abdomen is flat  Musculoskeletal:         General: No swelling, tenderness or deformity  Normal range of motion  Skin:     General: Skin is warm and dry  Capillary Refill: Capillary refill takes less than 2 seconds  Coloration: Skin is not jaundiced  Neurological:      General: No focal deficit present  Mental Status: She is alert and oriented to person, place, and time     Psychiatric:         Mood and Affect: Mood normal             Kermit Dowd MD

## 2022-12-23 NOTE — TELEPHONE ENCOUNTER
Regarding: nose bleed  ----- Message from Livier Matamoros sent at 12/23/2022  7:58 AM EST -----  "This is my fourth nose bleed in a week and I am getting concerned now "

## 2023-01-05 ENCOUNTER — HOSPITAL ENCOUNTER (OUTPATIENT)
Dept: RADIOLOGY | Age: 83
Discharge: HOME | End: 2023-01-05

## 2023-01-05 ENCOUNTER — OFFICE VISIT (OUTPATIENT)
Dept: OCCUPATIONAL THERAPY | Age: 83
End: 2023-01-05

## 2023-01-05 VITALS — WEIGHT: 220 LBS | HEIGHT: 65 IN | BODY MASS INDEX: 36.65 KG/M2

## 2023-01-05 DIAGNOSIS — M18.11 PRIMARY OSTEOARTHRITIS OF FIRST CARPOMETACARPAL JOINT OF RIGHT HAND: Primary | ICD-10-CM

## 2023-01-05 DIAGNOSIS — Z12.31 ENCOUNTER FOR SCREENING MAMMOGRAM FOR BREAST CANCER: ICD-10-CM

## 2023-01-05 NOTE — PROGRESS NOTES
Daily Note     Today's date: 2023  Patient name: Kathleen Mac  : 1940  MRN: [id number]  Referring provider: Cherrie Perez PA-C  Dx:   Encounter Diagnosis     ICD-10-CM    1  Primary osteoarthritis of first carpometacarpal joint of right hand  M18 11                      Subjective: "No pain"      Objective: See treatment diary below      Assessment: Upgrades tolerated well with no pain    Plan: Continue per plan of care  Precautions: [place] Post op Protocol     Precautions Maintain webspace, no CMC Adduction x 6wks  -2 wks - Comfort Cool    Begin ROM, edema control, scar management , fine motor   -6wks - begin strengthening     HEP: Wrist, Thumb AROM, theraputty pinch/grasp      Manuals    Scar mobs   15 10 10 10  10  10   MEM                                       Neuro Re-Ed             translation kp  KP x 1 (3) Kp (3), coins x 25 KP (5), coins x 25 KP (5), coins x 3     KP kp     Opposition   Med/small beads x 45 coins x 25, marble roll x 15 coins x 25, marble roll x 15  sm textured marble roll x 30 sm textured marble roll x 30     Thumb hops             In hand manip  2# 4 planes x2 3 planes no add  3 planes no add  2# BALL 3 planes no ab tbL 3 planes no ab  tbL 3 planes no ab   Wrist maze   10          Wall walking    10 10        EDC lrg rb x 30 y gummy 3x10    sm rb x 20 sm rb x 30 sm rb x30 lrg rb x20 lrg rb x 30   Ther Ex             Pinch/grasp GPW x 30, cylindrical grasp x 30 BPW x 30, cylindrical grasp x 30; Pinch GCP Grasp G4 (15#)    YPW x 20, cylindrical grasp x 20 YPW x 20, cylindrical grasp x 30 YPW x 20, cylindrical grasp x 30 GPW x 30, cylindrical grasp x 30 GPW x 30, cylindrical grasp x 30   Pinch [place] TT/Key GG 1x eac TT/Key GB 1x eac    TT/Key RR 1x each TT/Key RR 1x each TT/Key RR 1x each TT/Key RG 1x each TT/Key RG 1x each   WF/WE/S/P rpb 3x10 Gpb 3x10    YPB 3x10 YPB 3x10 YPB 3x10 RPB 3x10 rpb 3x10 Ther Activity                                       Gait Training                                       Modalities             [facility] 5 5   5  5

## 2023-01-06 ENCOUNTER — APPOINTMENT (OUTPATIENT)
Dept: OCCUPATIONAL THERAPY | Age: 83
End: 2023-01-06

## 2023-01-13 ENCOUNTER — OFFICE VISIT (OUTPATIENT)
Dept: OCCUPATIONAL THERAPY | Age: 83
End: 2023-01-13

## 2023-01-13 DIAGNOSIS — M18.11 PRIMARY OSTEOARTHRITIS OF FIRST CARPOMETACARPAL JOINT OF RIGHT HAND: Primary | ICD-10-CM

## 2023-01-13 NOTE — PROGRESS NOTES
Daily Note     Today's date: 2023  Patient name: Zaid Street  : 1940  MRN: [id number]  Referring provider: Chencho Guerra PA-C  Dx:   Encounter Diagnosis     ICD-10-CM    1  Primary osteoarthritis of first carpometacarpal joint of right hand  M18 11                      Subjective: Pt states she is going to [place] for 10 days to see her daughter and requested sheets with the PB exercises  Objective: See treatment diary below      Assessment: Tolerated treatment fair  Patient completed most of her exercises with ease today  Had to downgrade the resistance on the gripper from G4 to G2 today  She displayed fatigue with in hand ball rolls and with pinch/grasp peg activity needing to take breaks  No reports of pain during exercises  Plan: Continue per plan of care  Precautions: [place] Post op Protocol     Precautions Maintain webspace, no CMC Adduction x 6wks  -2 wks - Comfort Cool    Begin ROM, edema control, scar management , fine motor   -6wks - begin strengthening     HEP: Wrist, Thumb AROM, theraputty pinch/grasp      Manuals    Scar mobs    10 10 10  10  10   MEM                                       Neuro Re-Ed             translation kp   Kp (3), coins x 25 KP (5), coins x 25 KP (5), coins x 3     KP kp     Opposition    coins x 25, marble roll x 15 coins x 25, marble roll x 15  sm textured marble roll x 30 sm textured marble roll x 30     Thumb hops             In hand manip  2# 4 planes x3 2# 4 planes x3  3 planes no add  2# BALL 3 planes no ab tbL 3 planes no ab  tbL 3 planes no ab   Wrist maze             Wall walking    10 10        EDC lrg rb x 30 y gummy 3x10 ygummy 3x10   sm rb x 20 sm rb x 30 sm rb x30 lrg rb x20 lrg rb x 30   Ther Ex             Pinch/grasp GPW x 30, cylindrical grasp x 30 BPW x 30, cylindrical grasp x 30; Pinch GCP Grasp G4 (15#) BPW x 30, cylindrical grasp x 30; Pinch GCP Grasp G2   YPW x 20, cylindrical grasp x 20 YPW x 20, cylindrical grasp x 30 YPW x 20, cylindrical grasp x 30 GPW x 30, cylindrical grasp x 30 GPW x 30, cylindrical grasp x 30   Pinch [place] TT/Key GG 1x eac TT/Key GB 1x eac TT/Key GB 1x eac   TT/Key RR 1x each TT/Key RR 1x each TT/Key RR 1x each TT/Key RG 1x each TT/Key RG 1x each   WF/WE/S/P rpb 3x10 Gpb 3x10 Gpb 3x10   YPB 3x10 YPB 3x10 YPB 3x10 RPB 3x10 rpb 3x10                                                                    Ther Activity                                       Gait Training                                       Modalities             [facility] 5 5 5  5  5

## 2023-01-24 ENCOUNTER — OFFICE VISIT (OUTPATIENT)
Dept: OCCUPATIONAL THERAPY | Age: 83
End: 2023-01-24

## 2023-01-24 DIAGNOSIS — M18.11 PRIMARY OSTEOARTHRITIS OF FIRST CARPOMETACARPAL JOINT OF RIGHT HAND: Primary | ICD-10-CM

## 2023-01-24 NOTE — PROGRESS NOTES
Daily Note     Today's date: 2023  Patient name: Phil Fontenot  : 1940  MRN: [id number]  Referring provider: Werner Merlin, PA-C  Dx:   Encounter Diagnosis     ICD-10-CM    1  Primary osteoarthritis of first carpometacarpal joint of right hand  M18 11                      Subjective: "Feels fine"      Objective: See treatment diary below      Assessment: Upgrades tolerated well, no pain  Plan: DC     Precautions: [place] Post op Protocol     Precautions Maintain webspace, no CMC Adduction x 6wks  -2 wks - Comfort Cool    Begin ROM, edema control, scar management , fine motor   -6wks - begin strengthening     HEP: Wrist, Thumb AROM, theraputty pinch/grasp      Manuals    Scar mobs     10 10  10  10   MEM                                       Neuro Re-Ed             translation kp    KP (5), coins x 25 KP (5), coins x 3     KP kp     Opposition     coins x 25, marble roll x 15  sm textured marble roll x 30 sm textured marble roll x 30     Thumb hops             In hand manip  2# 4 planes x3 2# 4 planes x3 2# 4 planes x3 3 planes no add  2# BALL 3 planes no ab tbL 3 planes no ab  tbL 3 planes no ab   Wrist maze             Wall walking     10        EDC lrg rb x 30 y gummy 3x10 ygummy 3x10 ygummy 3x10  sm rb x 20 sm rb x 30 sm rb x30 lrg rb x20 lrg rb x 30   Ther Ex             Pinch/grasp GPW x 30, cylindrical grasp x 30 BPW x 30, cylindrical grasp x 30; Pinch GCP Grasp G4 (15#) BPW x 30, cylindrical grasp x 30; Pinch GCP Grasp G2 BlaPW x 30, cylindrical grasp x 30; Pinch GCP Grasp G2  YPW x 20, cylindrical grasp x 20 YPW x 20, cylindrical grasp x 30 YPW x 20, cylindrical grasp x 30 GPW x 30, cylindrical grasp x 30 GPW x 30, cylindrical grasp x 30   Pinch [place] TT/Key GG 1x eac TT/Key GB 1x eac TT/Key GB 1x eac TT/Key GB 1x  TT/Key RR 1x each TT/Key RR 1x each TT/Key RR 1x each TT/Key RG 1x each TT/Key RG 1x each   WF/WE/S/P rpb 3x10 Gpb 3x10 Gpb 3x10 GPb 3x10  YPB 3x10 YPB 3x10 YPB 3x10 RPB 3x10 rpb 3x10                                                                    Ther Activity                                       Gait Training                                       Modalities             P 5 5 5 5 5  5

## 2023-01-25 ENCOUNTER — TELEPHONE (OUTPATIENT)
Dept: OBGYN CLINIC | Facility: HOSPITAL | Age: 83
End: 2023-01-25

## 2023-01-25 NOTE — TELEPHONE ENCOUNTER
Caller: Self    Doctor: Maura Fonseca    Reason for call: Patient would like to reschedule appt from today due to weather      Call back#: [phone]

## 2023-02-17 ENCOUNTER — OFFICE VISIT (OUTPATIENT)
Dept: OBGYN CLINIC | Facility: HOSPITAL | Age: 83
End: 2023-02-17

## 2023-02-17 VITALS — HEIGHT: 65 IN | WEIGHT: 230 LBS | BODY MASS INDEX: 38.32 KG/M2 | HEART RATE: 67 BPM

## 2023-02-17 DIAGNOSIS — M18.11 PRIMARY OSTEOARTHRITIS OF FIRST CARPOMETACARPAL JOINT OF RIGHT HAND: Primary | ICD-10-CM

## 2023-02-17 NOTE — PROGRESS NOTES
Assessment:   S/P Right thumb interpositional arthroplasty - Right, Right de Quervain release - Right, and Application short-arm thumb spica splint - Right on 10/25/2022    Plan:   Patient was advised to use her hand as tolerated for activities of daily living  She was encouraged to continue with range of motion and strengthening with a home exercise program  She will follow-up with us as needed    Follow Up:  PRN    To Do Next Visit:  Reevaluation      CHIEF COMPLAINT:  Chief Complaint   Patient presents with   • Right Thumb - Follow-up     Right thumb interpositional arthroplasty - Right        Right de Quervain release- 10/25/22                     SUBJECTIVE:  Naldo Gonzalez is a [de-identified] y o  female who presents for follow up after Right thumb interpositional arthroplasty - Right, Right de Quervain release - Right, and Application short-arm thumb spica splint - Right on 10/25/2022  Today patient has minimal pain or discomfort  She has been using her hand as tolerated for her activities of daily living         PHYSICAL EXAMINATION:  Vital signs: BP (P) 136/83   Pulse 67   Ht 5' 5" (1 651 m)   Wt 104 kg (230 lb)   BMI 38 27 kg/m²   General: well developed and well nourished, alert, oriented times 3 and appears comfortable  Psychiatric: Normal    MUSCULOSKELETAL EXAMINATION:  Incision: Clean, dry, intact  Range of Motion: As expected, opposition intact and full composite fist possible  Neurovascular status: Neuro intact, good cap refill  Activity Restrictions: No restrictions         STUDIES REVIEWED:  No Studies to review      PROCEDURES PERFORMED:  Procedures  No Procedures performed today

## 2023-02-24 ENCOUNTER — OFFICE VISIT (OUTPATIENT)
Dept: OBGYN CLINIC | Facility: MEDICAL CENTER | Age: 83
End: 2023-02-24

## 2023-02-24 VITALS
DIASTOLIC BLOOD PRESSURE: 83 MMHG | BODY MASS INDEX: 38.27 KG/M2 | HEIGHT: 65 IN | SYSTOLIC BLOOD PRESSURE: 167 MMHG | HEART RATE: 70 BPM

## 2023-02-24 DIAGNOSIS — M25.561 CHRONIC PAIN OF BOTH KNEES: ICD-10-CM

## 2023-02-24 DIAGNOSIS — G89.29 CHRONIC PAIN OF BOTH KNEES: ICD-10-CM

## 2023-02-24 DIAGNOSIS — M75.51 SUBACROMIAL BURSITIS OF RIGHT SHOULDER JOINT: ICD-10-CM

## 2023-02-24 DIAGNOSIS — M17.0 BILATERAL PRIMARY OSTEOARTHRITIS OF KNEE: Primary | ICD-10-CM

## 2023-02-24 DIAGNOSIS — M25.562 CHRONIC PAIN OF BOTH KNEES: ICD-10-CM

## 2023-02-24 RX ORDER — BETAMETHASONE SODIUM PHOSPHATE AND BETAMETHASONE ACETATE 3; 3 MG/ML; MG/ML
12 INJECTION, SUSPENSION INTRA-ARTICULAR; INTRALESIONAL; INTRAMUSCULAR; SOFT TISSUE
Status: COMPLETED | OUTPATIENT
Start: 2023-02-24 | End: 2023-02-24

## 2023-02-24 RX ORDER — LIDOCAINE HYDROCHLORIDE 10 MG/ML
2 INJECTION, SOLUTION INFILTRATION; PERINEURAL
Status: COMPLETED | OUTPATIENT
Start: 2023-02-24 | End: 2023-02-24

## 2023-02-24 RX ORDER — BUPIVACAINE HYDROCHLORIDE 2.5 MG/ML
2 INJECTION, SOLUTION INFILTRATION; PERINEURAL
Status: COMPLETED | OUTPATIENT
Start: 2023-02-24 | End: 2023-02-24

## 2023-02-24 RX ADMIN — BUPIVACAINE HYDROCHLORIDE 2 ML: 2.5 INJECTION, SOLUTION INFILTRATION; PERINEURAL at 14:45

## 2023-02-24 RX ADMIN — BETAMETHASONE SODIUM PHOSPHATE AND BETAMETHASONE ACETATE 12 MG: 3; 3 INJECTION, SUSPENSION INTRA-ARTICULAR; INTRALESIONAL; INTRAMUSCULAR; SOFT TISSUE at 14:45

## 2023-02-24 RX ADMIN — LIDOCAINE HYDROCHLORIDE 2 ML: 10 INJECTION, SOLUTION INFILTRATION; PERINEURAL at 14:45

## 2023-02-24 NOTE — PROGRESS NOTES
Subjective; Adult female patient well-known to the practice  She has established osteoarthritis of her knees  She obtains symptomatic improvement by periodic injections  She is going on a cruise next week    Desire knee injections, and her right shoulder    Past Medical History:   Diagnosis Date   • Arthritis    • Disease of thyroid gland     hypo   • GERD (gastroesophageal reflux disease)    • Headache    • Hypertension    • Overactive bladder    • Seasonal allergies    • Wears glasses        Past Surgical History:   Procedure Laterality Date   • ANKLE SURGERY      x2   • APPENDECTOMY     • CAST APPLICATION Right 11/44/8045    Procedure: Application short-arm thumb spica splint;  Surgeon: Silviano Kenney MD;  Location: [facility];  Service: Orthopedics   • CHOLECYSTECTOMY      laparoscopic   • COLONOSCOPY     • GALLBLADDER SURGERY     • HYSTERECTOMY Bilateral     total abdominal with removal of both ovaries, age 39   • INCISION TENDON SHEATH HAND      of a finger   • NEUROPLASTY / TRANSPOSITION MEDIAN NERVE AT CARPAL TUNNEL     • OOPHORECTOMY Bilateral     Age 39   • OK ARTHRP INTERPOS INTERCARPAL/METACARPAL JOINTS Right 10/25/2022    Procedure: Right thumb interpositional arthroplasty;  Surgeon: Silviano Kenney MD;  Location: [facility];  Service: Orthopedics   • OK INCISION EXTENSOR TENDON SHEATH WRIST Right 10/25/2022    Procedure: Right de Quervain release;  Surgeon: Silviano Kenney MD;  Location: [facility];  Service: Orthopedics   • TUBAL LIGATION         Family History   Problem Relation Age of Onset   • Heart disease Mother         endocarditis   • Heart disease Father    • Prostate cancer Father 80   • No Known Problems Sister    • No Known Problems Daughter    • No Known Problems Maternal Grandmother    • No Known Problems Maternal Grandfather    • Kidney cancer Paternal Grandmother 48   • No Known Problems Paternal Grandfather    • No Known Problems Sister    • No Known Problems Son    • No Known Problems Son    • No Known Problems Son    • No Known Problems Maternal Aunt    • No Known Problems Maternal Aunt    • No Known Problems Paternal Aunt    • Breast cancer Paternal Aunt 62   • No Known Problems Paternal Aunt    • No Known Problems Paternal Aunt    • Breast cancer Paternal Aunt 64       Social History     Tobacco Use   • Smoking status: Never   • Smokeless tobacco: Never   Vaping Use   • Vaping Use: Never used   Substance Use Topics   • Alcohol use: Yes     Comment: wine with dinner; occasional use as per [employer]   • Drug use: No     Exam;    Shoulder discomfort subacromial space with abduction and external rotation  No weakness of the abducted shoulder  Both knees exhibit medial greater than lateral joint line tenderness  They have no redness no palpable warmth of concern  Large joint arthrocentesis: R knee  Universal Protocol:  Consent given by: patient    Supporting Documentation  Indications: pain   Procedure Details  Location: knee - R knee  Needle size: 22 G  Ultrasound guidance: no  Medications administered: 2 mL bupivacaine 0 25 %; 2 mL lidocaine 1 %; 12 mg betamethasone acetate-betamethasone sodium phosphate 6 (3-3) mg/mL      Large joint arthrocentesis: L knee  Universal Protocol:  Consent given by: patient    Supporting Documentation  Indications: pain   Procedure Details  Location: knee - L knee  Needle size: 22 G  Medications administered: 2 mL bupivacaine 0 25 %; 2 mL lidocaine 1 %; 12 mg betamethasone acetate-betamethasone sodium phosphate 6 (3-3) mg/mL      Large joint arthrocentesis: R subacromial bursa  Procedure Details  Location: shoulder - R subacromial bursa  Needle size: 22 G    Patient tolerance: patient tolerated the procedure well with no immediate complications  Dressing:  Sterile dressing applied      Impression;    Bilateral knee pain  Bilateral knee osteoarthritis      Plan;    Patient was offered and received injections to both knees and right shoulder  We will see her in the future on a as needed basis  Her experience was supervised by, and plan formulated by the attending surgeon, it was my pleasure to assist him in the delivery of her care

## 2023-02-27 RX ORDER — BETAMETHASONE SODIUM PHOSPHATE AND BETAMETHASONE ACETATE 3; 3 MG/ML; MG/ML
12 INJECTION, SUSPENSION INTRA-ARTICULAR; INTRALESIONAL; INTRAMUSCULAR; SOFT TISSUE
Status: COMPLETED | OUTPATIENT
Start: 2023-02-27 | End: 2023-02-27

## 2023-02-27 RX ADMIN — BETAMETHASONE SODIUM PHOSPHATE AND BETAMETHASONE ACETATE 12 MG: 3; 3 INJECTION, SUSPENSION INTRA-ARTICULAR; INTRALESIONAL; INTRAMUSCULAR; SOFT TISSUE at 12:17

## 2023-05-23 DIAGNOSIS — T78.40XD ALLERGIC DISORDER, SUBSEQUENT ENCOUNTER: ICD-10-CM

## 2023-05-23 RX ORDER — MONTELUKAST SODIUM 10 MG/1
TABLET ORAL
Qty: 90 TABLET | Refills: 3 | Status: SHIPPED | OUTPATIENT
Start: 2023-05-23

## 2023-05-28 ENCOUNTER — RA CDI HCC (OUTPATIENT)
Dept: OTHER | Facility: HOSPITAL | Age: 83
End: 2023-05-28

## 2023-05-28 NOTE — PROGRESS NOTES
[facility] 75  coding opportunities       Chart reviewed, no opportunity found:   [address]        Patients Insurance     Medicare Insurance: [employer]

## 2023-06-06 ENCOUNTER — OFFICE VISIT (OUTPATIENT)
Dept: FAMILY MEDICINE CLINIC | Facility: CLINIC | Age: 83
End: 2023-06-06
Payer: COMMERCIAL

## 2023-06-06 VITALS
HEART RATE: 78 BPM | HEIGHT: 65 IN | BODY MASS INDEX: 38.32 KG/M2 | OXYGEN SATURATION: 97 % | WEIGHT: 230 LBS | SYSTOLIC BLOOD PRESSURE: 126 MMHG | TEMPERATURE: 97.6 F | DIASTOLIC BLOOD PRESSURE: 66 MMHG

## 2023-06-06 DIAGNOSIS — I10 HYPERTENSION, UNSPECIFIED TYPE: ICD-10-CM

## 2023-06-06 DIAGNOSIS — E78.5 HYPERLIPIDEMIA, UNSPECIFIED HYPERLIPIDEMIA TYPE: ICD-10-CM

## 2023-06-06 DIAGNOSIS — N18.31 STAGE 3A CHRONIC KIDNEY DISEASE (HCC): ICD-10-CM

## 2023-06-06 DIAGNOSIS — Z00.00 MEDICARE ANNUAL WELLNESS VISIT, SUBSEQUENT: Primary | ICD-10-CM

## 2023-06-06 DIAGNOSIS — N32.81 OVERACTIVE BLADDER: ICD-10-CM

## 2023-06-06 DIAGNOSIS — E66.01 OBESITY, MORBID (HCC): ICD-10-CM

## 2023-06-06 DIAGNOSIS — D75.89 MACROCYTOSIS WITHOUT ANEMIA: ICD-10-CM

## 2023-06-06 PROCEDURE — G0439 PPPS, SUBSEQ VISIT: HCPCS | Performed by: FAMILY MEDICINE

## 2023-06-06 NOTE — PROGRESS NOTES
Assessment and Plan:     Problem List Items Addressed This Visit        Cardiovascular and Mediastinum    Hypertension    Relevant Orders    TSH, 3rd generation with Free T4 reflex    Comprehensive metabolic panel       Genitourinary    Overactive bladder       Other    Obesity, morbid (HCC)    Macrocytosis without anemia    Relevant Medications    cyanocobalamin (VITAMIN B-12) 100 MCG tablet    Other Relevant Orders    CBC and differential   Other Visit Diagnoses     Medicare annual wellness visit, subsequent    -  Primary    Hyperlipidemia, unspecified hyperlipidemia type        Relevant Orders    Lipid Panel with Direct LDL reflex    Stage 3a chronic kidney disease ([facility] [address] )               Preventive health issues were discussed with patient, and age appropriate screening tests were ordered as noted in patient's After Visit Summary  Personalized health advice and appropriate referrals for health education or preventive services given if needed, as noted in patient's After Visit Summary  BMI Counseling: Body mass index is 38 27 kg/m²  The BMI is above normal  Nutrition recommendations include reducing portion sizes, 3-5 servings of fruits/vegetables daily and consuming healthier snacks  Exercise recommendations include moderate aerobic physical activity for 150 minutes/week  History of Present Illness:     Patient presents for a Medicare Wellness Visit    HPI     72-year-old female patient presents for annual Medicare wellness visit  Most recent blood work from June 2022 reviewed    Patient Care Team:  Yumiko Albert MD as PCP - General (Family Medicine)  Yumiko Albert MD (Family Medicine)     Review of Systems:     Review of Systems   Constitutional: Negative for chills and fever  HENT: Negative for congestion, rhinorrhea and sore throat  Respiratory: Negative for shortness of breath  Cardiovascular: Negative for chest pain  Gastrointestinal: Negative for abdominal pain  Musculoskeletal: Positive for arthralgias (baseline)  Negative for back pain  Neurological: Negative for headaches  Psychiatric/Behavioral: Positive for sleep disturbance          Problem List:     Patient Active Problem List   Diagnosis   • Hypertension   • Hypothyroidism   • Overactive bladder   • Palpitations   • EKG, abnormal   • Primary osteoarthritis of right knee   • Chronic pain of right knee   • Seasonal allergies   • Obesity, morbid (HCC)   • Stage 3 chronic kidney disease, unspecified whether stage 3a or 3b CKD (HCC)   • Rash   • Macrocytosis without anemia   • Epistaxis      Past Medical and Surgical History:     Past Medical History:   Diagnosis Date   • Arthritis    • Disease of thyroid gland     hypo   • GERD (gastroesophageal reflux disease)    • Headache    • Hypertension    • Overactive bladder    • Seasonal allergies    • Wears glasses      Past Surgical History:   Procedure Laterality Date   • ANKLE SURGERY      x2   • APPENDECTOMY     • CAST APPLICATION Right 37/78/8814    Procedure: Application short-arm thumb spica splint;  Surgeon: Nick Power MD;  Location: [facility];  Service: Orthopedics   • CHOLECYSTECTOMY      laparoscopic   • COLONOSCOPY     • GALLBLADDER SURGERY     • HYSTERECTOMY Bilateral     total abdominal with removal of both ovaries, age 39   • INCISION TENDON SHEATH HAND      of a finger   • NEUROPLASTY / TRANSPOSITION MEDIAN NERVE AT CARPAL TUNNEL     • OOPHORECTOMY Bilateral     Age 39   • ND ARTHRP INTERPOS INTERCARPAL/METACARPAL JOINTS Right 10/25/2022    Procedure: Right thumb interpositional arthroplasty;  Surgeon: Nick Power MD;  Location: [facility];  Service: Orthopedics   • ND INCISION EXTENSOR TENDON SHEATH WRIST Right 10/25/2022    Procedure: Right de Quervain release;  Surgeon: Nick Power MD;  Location: [facility];  Service: Orthopedics   • TUBAL LIGATION        Family History:     Family History   Problem Relation Age of Onset   • Heart disease Mother         endocarditis   • Heart disease Father    • Prostate cancer Father 80   • No Known Problems Sister    • No Known Problems Daughter    • No Known Problems Maternal Grandmother    • No Known Problems Maternal Grandfather    • Kidney cancer Paternal Grandmother 48   • No Known Problems Paternal Grandfather    • No Known Problems Sister    • No Known Problems Son    • No Known Problems Son    • No Known Problems Son    • No Known Problems Maternal Aunt    • No Known Problems Maternal Aunt    • No Known Problems Paternal Aunt    • Breast cancer Paternal Aunt 59   • No Known Problems Paternal Aunt    • No Known Problems Paternal Aunt    • Breast cancer Paternal Aunt 62      Social History:     Social History     Socioeconomic History   • Marital status:      Spouse name: None   • Number of children: None   • Years of education: None   • Highest education level: None   Occupational History   • None   Tobacco Use   • Smoking status: Never   • Smokeless tobacco: Never   Vaping Use   • Vaping Use: Never used   Substance and Sexual Activity   • Alcohol use: Yes     Comment: wine with dinner; occasional use as per [employer]   • Drug use: No   • Sexual activity: None   Other Topics Concern   • None   Social History Narrative   • None     Social Determinants of Health     Financial Resource Strain: Unknown (6/6/2023)    Overall Financial Resource Strain (CARDIA)    • Difficulty of Paying Living Expenses: Patient refused   Food Insecurity: Not on file   Transportation Needs: No Transportation Needs (6/6/2023)    PRAPARE - Transportation    • Lack of Transportation (Medical): No    • Lack of Transportation (Non-Medical):  No   Physical Activity: Not on file   Stress: Not on file   Social Connections: Not on file   Intimate Partner Violence: Not on file   Housing Stability: Not on file      Medications and Allergies:     Current Outpatient Medications   Medication Sig Dispense Refill   • Aspirin (ASPIR-81 PO) Aspir-81 81 MG Oral Tablet Delayed Release  TAKE 1 TABLET (81 MG TOTAL) BY MOUTH DAILY  Refills: 0       Radha Flagler DO;  Started 27-May-2016  Active     • Biotin 1 MG CAPS Biotin 1 MG Oral Capsule   Refills: 0       Radha Darshana DO;  Started 27-May-2016  Active     • Cholecalciferol (VITAMIN D3 PO) Take by mouth     • Coral Calcium 1000 (390 Ca) MG TABS Coral Calcium 500 MG Oral Tablet   Refills: 0       Radha Flagler DO;  Started 27-May-2016  Active     • cyanocobalamin (VITAMIN B-12) 100 MCG tablet Take 100 mcg by mouth daily     • docusate sodium (COLACE) 100 mg capsule Take by mouth 3 (three) times a day     • doxylamine (UNISON) 25 MG tablet daily at bedtime as needed     • Famotidine (PEPCID PO) Take 20 mg by mouth 2 (two) times a day       • levothyroxine 125 mcg tablet TAKE 1 TABLET DAILY 90 tablet 1   • losartan (COZAAR) 50 mg tablet TAKE 1 TABLET DAILY 90 tablet 3   • meloxicam (MOBIC) 15 mg tablet Take 1 tablet (15 mg total) by mouth daily as needed for moderate pain 90 tablet 1   • metoprolol succinate (TOPROL-XL) 50 mg 24 hr tablet TAKE 1 TABLET DAILY  HOLD FOR HEART RATE LESS THAN 50 BEATS PER MINUTE 90 tablet 1   • montelukast (SINGULAIR) 10 mg tablet TAKE 1 TABLET DAILY 90 tablet 3   • Multiple Vitamins-Minerals (MULTIVITAMIN ADULT PO) Take by mouth     • Omega-3 1000 MG CAPS Take by mouth     • oxybutynin (DITROPAN-XL) 10 MG 24 hr tablet TAKE 1 TABLET DAILY AT BEDTIME 90 tablet 1   • Psyllium (METAMUCIL FIBER PO) Take by mouth     • Red Yeast Rice 600 MG CAPS Take by mouth     • acetaminophen (TYLENOL) 650 mg CR tablet Take 1 tablet (650 mg total) by mouth every 8 (eight) hours as needed for mild pain (Patient not taking: Reported on 6/6/2023) 30 tablet 0     No current facility-administered medications for this visit       Allergies   Allergen Reactions   • Other Nasal Congestion     seasonal      Immunizations:     Immunization History   Administered Date(s) Administered   • COVID-19 PFIZER VACCINE 0 3 ML IM 01/15/2021, 02/08/2021, 10/08/2022   • INFLUENZA 09/15/2016, 09/14/2018, 09/13/2019, 10/04/2021   • Pneumococcal Conjugate 13-Valent 09/15/2016   • Pneumococcal Polysaccharide PPV23 10/01/2014   • Tdap 09/15/2016   • Zoster 07/31/2017      Health Maintenance:         Topic Date Due   • DXA SCAN  11/09/2023   • Breast Cancer Screening: Mammogram  01/05/2024         Topic Date Due   • COVID-19 Vaccine (4 - Pfizer series) 12/03/2022   • Influenza Vaccine (Season Ended) 09/01/2023      Medicare Screening Tests and Risk Assessments:     Adrienne Tamayo is here for her Subsequent Wellness visit  Health Risk Assessment:   Patient rates overall health as good  Patient feels that their physical health rating is same  Patient is very satisfied with their life  Eyesight was rated as same  Hearing was rated as same  Patient feels that their emotional and mental health rating is same  Patients states they are never, rarely angry  Patient states they are sometimes unusually tired/fatigued  Pain experienced in the last 7 days has been some  Patient's pain rating has been 5/10  Patient states that she has experienced no weight loss or gain in last 6 months  Baseline arthritis issue    Depression Screening:   PHQ-2 Score: 0      Fall Risk Screening: In the past year, patient has experienced: no history of falling in past year      Urinary Incontinence Screening:   Patient has leaked urine accidently in the last six months  Home Safety:  Patient does not have trouble with stairs inside or outside of their home  Patient has working smoke alarms and has working carbon monoxide detector  Home safety hazards include: none  Nutrition:   Current diet is Regular  Medications:   Patient is currently taking over-the-counter supplements  OTC medications include: Red yeast rice, B12,D3, biotin, stool softener, calcium  Patient is able to manage medications       Activities of Daily Living (ADLs)/Instrumental Activities of Daily Living (IADLs):   Walk and transfer into and out of bed and chair?: Yes  Dress and groom yourself?: Yes    Bathe or shower yourself?: Yes    Feed yourself? Yes  Do your laundry/housekeeping?: Yes  Manage your money, pay your bills and track your expenses?: Yes  Make your own meals?: Yes    Do your own shopping?: Yes    Previous Hospitalizations:   Any hospitalizations or ED visits within the last 12 months?: No      Advance Care Planning:   Living will: Yes    Durable POA for healthcare: Yes    Advanced directive: Yes      Cognitive Screening:   Provider or family/friend/caregiver concerned regarding cognition?: No    PREVENTIVE SCREENINGS      Cardiovascular Screening:    General: Screening Current and Risks and Benefits Discussed    Due for: Lipid Panel      Diabetes Screening:     General: Screening Current and Risks and Benefits Discussed    Due for: Blood Glucose      Colorectal Cancer Screening:     General: Screening Not Indicated      Breast Cancer Screening:     General: Screening Current      Cervical Cancer Screening:    General: Screening Not Indicated      Osteoporosis Screening:    General: Screening Current      Abdominal Aortic Aneurysm (AAA) Screening:        General: Screening Not Indicated      Lung Cancer Screening:     General: Screening Not Indicated      Hepatitis C Screening:    General: Risks and Benefits Discussed    Hep C Screening Accepted: No     Screening, Brief Intervention, and Referral to Treatment (SBIRT)    Screening  Typical number of drinks in a day: 2  Typical number of drinks in a week: 14  Interpretation: Low risk drinking behavior  AUDIT-C Screenin) How often did you have a drink containing alcohol in the past year? monthly or less  2) How many drinks did you have on a typical day when you were drinking in the past year?  1 to 2  3) How often did you have 6 or more drinks on one occasion in the past year? never    AUDIT-C Score: "1  Interpretation: Score 0-2 (female): Negative screen for alcohol misuse    Single Item Drug Screening:  How often have you used an illegal drug (including marijuana) or a prescription medication for non-medical reasons in the past year? never    Single Item Drug Screen Score: 0  Interpretation: Negative screen for possible drug use disorder    Other Counseling Topics:   Car/seat belt/driving safety, skin self-exam, sunscreen and calcium and vitamin D intake and regular weightbearing exercise  No results found       Physical Exam:     /66 (BP Location: Left arm, Patient Position: Sitting, Cuff Size: Standard)   Pulse 78   Temp 97 6 °F (36 4 °C)   Ht 5' 5\" (1 651 m)   Wt 104 kg (230 lb)   SpO2 97%   BMI 38 27 kg/m²          Marek Albert MD  "

## 2023-06-06 NOTE — PATIENT INSTRUCTIONS
Medicare Preventive Visit Patient Instructions  Thank you for completing your Welcome to Medicare Visit or Medicare Annual Wellness Visit today  Your next wellness visit will be due in one year (6/6/2024)  The screening/preventive services that you may require over the next 5-10 years are detailed below  Some tests may not apply to you based off risk factors and/or age  Screening tests ordered at today's visit but not completed yet may show as past due  Also, please note that scanned in results may not display below  Preventive Screenings:  Service Recommendations Previous Testing/Comments   Colorectal Cancer Screening  * Colonoscopy    * Fecal Occult Blood Test (FOBT)/Fecal Immunochemical Test (FIT)  * Fecal DNA/Cologuard Test  * Flexible Sigmoidoscopy Age: 39-70 years old   Colonoscopy: every 10 years (may be performed more frequently if at higher risk)  OR  FOBT/FIT: every 1 year  OR  Cologuard: every 3 years  OR  Sigmoidoscopy: every 5 years  Screening may be recommended earlier than age 39 if at higher risk for colorectal cancer  Also, an individualized decision between you and your healthcare provider will decide whether screening between the ages of 74-80 would be appropriate  Colonoscopy: Not on file  FOBT/FIT: Not on file  Cologuard: Not on file  Sigmoidoscopy: Not on file          Breast Cancer Screening Age: 36 years old  Frequency: every 1-2 years  Not required if history of left and right mastectomy Mammogram: 01/05/2023    Screening Current   Cervical Cancer Screening Between the ages of 21-29, pap smear recommended once every 3 years  Between the ages of 33-67, can perform pap smear with HPV co-testing every 5 years     Recommendations may differ for women with a history of total hysterectomy, cervical cancer, or abnormal pap smears in past  Pap Smear: Not on file    Screening Not Indicated   Hepatitis C Screening Once for adults born between 1945 and 1965  More frequently in patients at high risk for Hepatitis C Hep C Antibody: Not on file        Diabetes Screening 1-2 times per year if you're at risk for diabetes or have pre-diabetes Fasting glucose: 93 mg/dL (6/24/2022)  A1C: No results in last 5 years (No results in last 5 years)  Screening Current   Cholesterol Screening Once every 5 years if you don't have a lipid disorder  May order more often based on risk factors  Lipid panel: 06/24/2022    Screening Current     Other Preventive Screenings Covered by Medicare:  1  Abdominal Aortic Aneurysm (AAA) Screening: covered once if your at risk  You're considered to be at risk if you have a family history of AAA  2  Lung Cancer Screening: covers low dose CT scan once per year if you meet all of the following conditions: (1) Age 50-69; (2) No signs or symptoms of lung cancer; (3) Current smoker or have quit smoking within the last 15 years; (4) You have a tobacco smoking history of at least 20 pack years (packs per day multiplied by number of years you smoked); (5) You get a written order from a healthcare provider  3  Glaucoma Screening: covered annually if you're considered high risk: (1) You have diabetes OR (2) Family history of glaucoma OR (3)  aged 48 and older OR (3)  American aged 72 and older  3  Osteoporosis Screening: covered every 2 years if you meet one of the following conditions: (1) You're estrogen deficient and at risk for osteoporosis based off medical history and other findings; (2) Have a vertebral abnormality; (3) On glucocorticoid therapy for more than 3 months; (4) Have primary hyperparathyroidism; (5) On osteoporosis medications and need to assess response to drug therapy  · Last bone density test (DXA Scan): 11/09/2021   5  HIV Screening: covered annually if you're between the age of 15-65  Also covered annually if you are younger than 13 and older than 72 with risk factors for HIV infection   For pregnant patients, it is covered up to 3 times per pregnancy  Immunizations:  Immunization Recommendations   Influenza Vaccine Annual influenza vaccination during flu season is recommended for all persons aged >= 6 months who do not have contraindications   Pneumococcal Vaccine   * Pneumococcal conjugate vaccine = PCV13 (Prevnar 13), PCV15 (Vaxneuvance), PCV20 (Prevnar 20)  * Pneumococcal polysaccharide vaccine = PPSV23 (Pneumovax) Adults 25-60 years old: 1-3 doses may be recommended based on certain risk factors  Adults 72 years old: 1-2 doses may be recommended based off what pneumonia vaccine you previously received   Hepatitis B Vaccine 3 dose series if at intermediate or high risk (ex: diabetes, end stage renal disease, liver disease)   Tetanus (Td) Vaccine - COST NOT COVERED BY MEDICARE PART B Following completion of primary series, a booster dose should be given every 10 years to maintain immunity against tetanus  Td may also be given as tetanus wound prophylaxis  Tdap Vaccine - COST NOT COVERED BY MEDICARE PART B Recommended at least once for all adults  For pregnant patients, recommended with each pregnancy  Shingles Vaccine (Shingrix) - COST NOT COVERED BY MEDICARE PART B  2 shot series recommended in those aged 48 and above     Health Maintenance Due:      Topic Date Due   • DXA SCAN  11/09/2023   • Breast Cancer Screening: Mammogram  01/05/2024     Immunizations Due:      Topic Date Due   • COVID-19 Vaccine (4 - Pfizer series) 12/03/2022   • Influenza Vaccine (Season Ended) 09/01/2023     Advance Directives   What are advance directives? Advance directives are legal documents that state your wishes and plans for medical care  These plans are made ahead of time in case you lose your ability to make decisions for yourself  Advance directives can apply to any medical decision, such as the treatments you want, and if you want to donate organs  What are the types of advance directives?   There are many types of advance directives, and each state has rules about how to use them  You may choose a combination of any of the following:  · Living will: This is a written record of the treatment you want  You can also choose which treatments you do not want, which to limit, and which to stop at a certain time  This includes surgery, medicine, IV fluid, and tube feedings  · Durable power of  for healthcare [facility]): This is a written record that states who you want to make healthcare choices for you when you are unable to make them for yourself  This person, called a proxy, is usually a family member or a friend  You may choose more than 1 proxy  · Do not resuscitate (DNR) order:  A DNR order is used in case your heart stops beating or you stop breathing  It is a request not to have certain forms of treatment, such as CPR  A DNR order may be included in other types of advance directives  · Medical directive: This covers the care that you want if you are in a coma, near death, or unable to make decisions for yourself  You can list the treatments you want for each condition  Treatment may include pain medicine, surgery, blood transfusions, dialysis, IV or tube feedings, and a ventilator (breathing machine)  · Values history: This document has questions about your views, beliefs, and how you feel and think about life  This information can help others choose the care that you would choose  Why are advance directives important? An advance directive helps you control your care  Although spoken wishes may be used, it is better to have your wishes written down  Spoken wishes can be misunderstood, or not followed  Treatments may be given even if you do not want them  An advance directive may make it easier for your family to make difficult choices about your care  Urinary Incontinence   Urinary incontinence (UI)  is when you lose control of your bladder  UI develops because your bladder cannot store or empty urine properly   The 3 most common types of UI are stress incontinence, urge incontinence, or both  Medicines:   · May be given to help strengthen your bladder control  Report any side effects of medication to your healthcare provider  Do pelvic muscle exercises often:  Your pelvic muscles help you stop urinating  Squeeze these muscles tight for 5 seconds, then relax for 5 seconds  Gradually work up to squeezing for 10 seconds  Do 3 sets of 15 repetitions a day, or as directed  This will help strengthen your pelvic muscles and improve bladder control  Train your bladder:  Go to the bathroom at set times, such as every 2 hours, even if you do not feel the urge to go  You can also try to hold your urine when you feel the urge to go  For example, hold your urine for 5 minutes when you feel the urge to go  As that becomes easier, hold your urine for 10 minutes  Self-care:   · Keep a UI record  Write down how often you leak urine and how much you leak  Make a note of what you were doing when you leaked urine  · Drink liquids as directed  You may need to limit the amount of liquid you drink to help control your urine leakage  Do not drink any liquid right before you go to bed  Limit or do not have drinks that contain caffeine or alcohol  · Prevent constipation  Eat a variety of high-fiber foods  Good examples are high-fiber cereals, beans, vegetables, and whole-grain breads  Walking is the best way to trigger your intestines to have a bowel movement  · Exercise regularly and maintain a healthy weight  Weight loss and exercise will decrease pressure on your bladder and help you control your leakage  · Use a catheter as directed  to help empty your bladder  A catheter is a tiny, plastic tube that is put into your bladder to drain your urine  · Go to behavior therapy as directed  Behavior therapy may be used to help you learn to control your urge to urinate      Weight Management   Why it is important to manage your weight:  Being overweight increases your risk of health conditions such as heart disease, high blood pressure, type 2 diabetes, and certain types of cancer  It can also increase your risk for osteoarthritis, sleep apnea, and other respiratory problems  Aim for a slow, steady weight loss  Even a small amount of weight loss can lower your risk of health problems  How to lose weight safely:  A safe and healthy way to lose weight is to eat fewer calories and get regular exercise  You can lose up about 1 pound a week by decreasing the number of calories you eat by 500 calories each day  Healthy meal plan for weight management:  A healthy meal plan includes a variety of foods, contains fewer calories, and helps you stay healthy  A healthy meal plan includes the following:  · Eat whole-grain foods more often  A healthy meal plan should contain fiber  Fiber is the part of grains, fruits, and vegetables that is not broken down by your body  Whole-grain foods are healthy and provide extra fiber in your diet  Some examples of whole-grain foods are whole-wheat breads and pastas, oatmeal, brown rice, and bulgur  · Eat a variety of vegetables every day  Include dark, leafy greens such as spinach, kale, shawn greens, and mustard greens  Eat yellow and orange vegetables such as carrots, sweet potatoes, and winter squash  · Eat a variety of fruits every day  Choose fresh or canned fruit (canned in its own juice or light syrup) instead of juice  Fruit juice has very little or no fiber  · Eat low-fat dairy foods  Drink fat-free (skim) milk or 1% milk  Eat fat-free yogurt and low-fat cottage cheese  Try low-fat cheeses such as mozzarella and other reduced-fat cheeses  · Choose meat and other protein foods that are low in fat  Choose beans or other legumes such as split peas or lentils  Choose fish, skinless poultry (chicken or turkey), or lean cuts of red meat (beef or pork)  Before you cook meat or poultry, cut off any visible fat  · Use less fat and oil  Try baking foods instead of frying them  Add less fat, such as margarine, sour cream, regular salad dressing and mayonnaise to foods  Eat fewer high-fat foods  Some examples of high-fat foods include french fries, doughnuts, ice cream, and cakes  · Eat fewer sweets  Limit foods and drinks that are high in sugar  This includes candy, cookies, regular soda, and sweetened drinks  Exercise:  Exercise at least 30 minutes per day on most days of the week  Some examples of exercise include walking, biking, dancing, and swimming  You can also fit in more physical activity by taking the stairs instead of the elevator or parking farther away from stores  Ask your healthcare provider about the best exercise plan for you  © Copyright [employer] 2018 Information is for End User's use only and may not be sold, redistributed or otherwise used for commercial purposes   All illustrations and images included in CareNotes® are the copyrighted property of A D A M , Inc  or [address] [place], [place] [address]

## 2023-06-13 ENCOUNTER — APPOINTMENT (OUTPATIENT)
Dept: LAB | Age: 83
End: 2023-06-13
Payer: COMMERCIAL

## 2023-06-13 DIAGNOSIS — N32.81 OVERACTIVE BLADDER: ICD-10-CM

## 2023-06-13 DIAGNOSIS — I10 ESSENTIAL HYPERTENSION: ICD-10-CM

## 2023-06-13 DIAGNOSIS — D75.89 MACROCYTOSIS WITHOUT ANEMIA: ICD-10-CM

## 2023-06-13 DIAGNOSIS — E03.9 HYPOTHYROIDISM, UNSPECIFIED TYPE: ICD-10-CM

## 2023-06-13 DIAGNOSIS — E78.5 HYPERLIPIDEMIA, UNSPECIFIED HYPERLIPIDEMIA TYPE: ICD-10-CM

## 2023-06-13 DIAGNOSIS — R04.0 EPISTAXIS: ICD-10-CM

## 2023-06-13 DIAGNOSIS — I10 HYPERTENSION, UNSPECIFIED TYPE: ICD-10-CM

## 2023-06-13 LAB
ALBUMIN SERPL BCP-MCNC: 3.6 G/DL (ref 3.5–5)
ALP SERPL-CCNC: 55 U/L (ref 46–116)
ALT SERPL W P-5'-P-CCNC: 37 U/L (ref 12–78)
ANION GAP SERPL CALCULATED.3IONS-SCNC: 2 MMOL/L (ref 4–13)
AST SERPL W P-5'-P-CCNC: 30 U/L (ref 5–45)
BASOPHILS # BLD AUTO: 0.06 THOUSANDS/ÂΜL (ref 0–0.1)
BASOPHILS NFR BLD AUTO: 1 % (ref 0–1)
BILIRUB SERPL-MCNC: 0.72 MG/DL (ref 0.2–1)
BUN SERPL-MCNC: 15 MG/DL (ref 5–25)
CALCIUM SERPL-MCNC: 9.5 MG/DL (ref 8.3–10.1)
CHLORIDE SERPL-SCNC: 111 MMOL/L (ref 96–108)
CHOLEST SERPL-MCNC: 209 MG/DL
CO2 SERPL-SCNC: 28 MMOL/L (ref 21–32)
CREAT SERPL-MCNC: 0.85 MG/DL (ref 0.6–1.3)
EOSINOPHIL # BLD AUTO: 0.17 THOUSAND/ÂΜL (ref 0–0.61)
EOSINOPHIL NFR BLD AUTO: 4 % (ref 0–6)
ERYTHROCYTE [DISTWIDTH] IN BLOOD BY AUTOMATED COUNT: 12.5 % (ref 11.6–15.1)
GFR SERPL CREATININE-BSD FRML MDRD: 64 ML/MIN/1.73SQ M
GLUCOSE P FAST SERPL-MCNC: 99 MG/DL (ref 65–99)
HCT VFR BLD AUTO: 44.3 % (ref 34.8–46.1)
HDLC SERPL-MCNC: 86 MG/DL
HGB BLD-MCNC: 15 G/DL (ref 11.5–15.4)
IMM GRANULOCYTES # BLD AUTO: 0.03 THOUSAND/UL (ref 0–0.2)
IMM GRANULOCYTES NFR BLD AUTO: 1 % (ref 0–2)
INR PPP: 1.1 (ref 0.84–1.19)
LDLC SERPL CALC-MCNC: 98 MG/DL (ref 0–100)
LYMPHOCYTES # BLD AUTO: 1.59 THOUSANDS/ÂΜL (ref 0.6–4.47)
LYMPHOCYTES NFR BLD AUTO: 34 % (ref 14–44)
MCH RBC QN AUTO: 34.9 PG (ref 26.8–34.3)
MCHC RBC AUTO-ENTMCNC: 33.9 G/DL (ref 31.4–37.4)
MCV RBC AUTO: 103 FL (ref 82–98)
MONOCYTES # BLD AUTO: 0.72 THOUSAND/ÂΜL (ref 0.17–1.22)
MONOCYTES NFR BLD AUTO: 15 % (ref 4–12)
NEUTROPHILS # BLD AUTO: 2.15 THOUSANDS/ÂΜL (ref 1.85–7.62)
NEUTS SEG NFR BLD AUTO: 45 % (ref 43–75)
NRBC BLD AUTO-RTO: 0 /100 WBCS
PLATELET # BLD AUTO: 203 THOUSANDS/UL (ref 149–390)
PMV BLD AUTO: 12.6 FL (ref 8.9–12.7)
POTASSIUM SERPL-SCNC: 4.9 MMOL/L (ref 3.5–5.3)
PROT SERPL-MCNC: 7.1 G/DL (ref 6.4–8.4)
PROTHROMBIN TIME: 14.5 SECONDS (ref 11.6–14.5)
RBC # BLD AUTO: 4.3 MILLION/UL (ref 3.81–5.12)
SODIUM SERPL-SCNC: 141 MMOL/L (ref 135–147)
TRIGL SERPL-MCNC: 127 MG/DL
TSH SERPL DL<=0.05 MIU/L-ACNC: 1.94 UIU/ML (ref 0.45–4.5)
VIT B12 SERPL-MCNC: 456 PG/ML (ref 180–914)
WBC # BLD AUTO: 4.72 THOUSAND/UL (ref 4.31–10.16)

## 2023-06-13 PROCEDURE — 85025 COMPLETE CBC W/AUTO DIFF WBC: CPT

## 2023-06-13 PROCEDURE — 80061 LIPID PANEL: CPT

## 2023-06-13 PROCEDURE — 36415 COLL VENOUS BLD VENIPUNCTURE: CPT

## 2023-06-13 PROCEDURE — 84443 ASSAY THYROID STIM HORMONE: CPT

## 2023-06-13 PROCEDURE — 80053 COMPREHEN METABOLIC PANEL: CPT

## 2023-06-13 PROCEDURE — 85610 PROTHROMBIN TIME: CPT

## 2023-06-13 PROCEDURE — 82607 VITAMIN B-12: CPT

## 2023-06-13 RX ORDER — OXYBUTYNIN CHLORIDE 10 MG/1
TABLET, EXTENDED RELEASE ORAL
Qty: 90 TABLET | Refills: 3 | Status: SHIPPED | OUTPATIENT
Start: 2023-06-13

## 2023-06-13 RX ORDER — LEVOTHYROXINE SODIUM 0.12 MG/1
TABLET ORAL
Qty: 90 TABLET | Refills: 3 | Status: SHIPPED | OUTPATIENT
Start: 2023-06-13

## 2023-06-13 RX ORDER — METOPROLOL SUCCINATE 50 MG/1
TABLET, EXTENDED RELEASE ORAL
Qty: 90 TABLET | Refills: 3 | Status: SHIPPED | OUTPATIENT
Start: 2023-06-13

## 2023-07-10 DIAGNOSIS — I10 HYPERTENSION, UNSPECIFIED TYPE: ICD-10-CM

## 2023-07-10 RX ORDER — LOSARTAN POTASSIUM 50 MG/1
TABLET ORAL
Qty: 90 TABLET | Refills: 3 | Status: SHIPPED | OUTPATIENT
Start: 2023-07-10

## 2023-07-19 ENCOUNTER — TELEPHONE (OUTPATIENT)
Dept: FAMILY MEDICINE CLINIC | Facility: CLINIC | Age: 83
End: 2023-07-19

## 2023-10-09 DIAGNOSIS — I10 HYPERTENSION, UNSPECIFIED TYPE: ICD-10-CM

## 2023-10-10 RX ORDER — MELOXICAM 15 MG/1
TABLET ORAL
Qty: 90 TABLET | Refills: 1 | Status: SHIPPED | OUTPATIENT
Start: 2023-10-10

## 2024-01-09 ENCOUNTER — APPOINTMENT (OUTPATIENT)
Dept: RADIOLOGY | Age: 84
End: 2024-01-09
Payer: COMMERCIAL

## 2024-01-09 ENCOUNTER — HOSPITAL ENCOUNTER (OUTPATIENT)
Dept: RADIOLOGY | Age: 84
Discharge: HOME | End: 2024-01-09
Payer: COMMERCIAL

## 2024-01-09 VITALS — WEIGHT: 230 LBS | HEIGHT: 65 IN | BODY MASS INDEX: 38.32 KG/M2

## 2024-01-09 DIAGNOSIS — Z13.820 ENCOUNTER FOR SCREENING FOR OSTEOPOROSIS: ICD-10-CM

## 2024-01-09 DIAGNOSIS — Z12.31 VISIT FOR SCREENING MAMMOGRAM: ICD-10-CM

## 2024-01-09 PROCEDURE — 77067 SCR MAMMO BI INCL CAD: CPT

## 2024-01-09 PROCEDURE — 77080 DXA BONE DENSITY AXIAL: CPT

## 2024-01-09 PROCEDURE — 77063 BREAST TOMOSYNTHESIS BI: CPT

## 2024-01-11 ENCOUNTER — OFFICE VISIT (OUTPATIENT)
Dept: FAMILY MEDICINE CLINIC | Facility: CLINIC | Age: 84
End: 2024-01-11
Payer: COMMERCIAL

## 2024-01-11 VITALS
OXYGEN SATURATION: 97 % | SYSTOLIC BLOOD PRESSURE: 140 MMHG | WEIGHT: 229 LBS | TEMPERATURE: 97.3 F | HEART RATE: 90 BPM | BODY MASS INDEX: 38.15 KG/M2 | HEIGHT: 65 IN | DIASTOLIC BLOOD PRESSURE: 72 MMHG

## 2024-01-11 DIAGNOSIS — J30.2 SEASONAL ALLERGIES: ICD-10-CM

## 2024-01-11 DIAGNOSIS — I10 HYPERTENSION, UNSPECIFIED TYPE: Primary | ICD-10-CM

## 2024-01-11 DIAGNOSIS — E03.9 HYPOTHYROIDISM, UNSPECIFIED TYPE: ICD-10-CM

## 2024-01-11 DIAGNOSIS — G47.09 OTHER INSOMNIA: ICD-10-CM

## 2024-01-11 PROCEDURE — 99214 OFFICE O/P EST MOD 30 MIN: CPT | Performed by: FAMILY MEDICINE

## 2024-01-11 NOTE — PROGRESS NOTES
Name: Maty Gee      : 1940      MRN: [id number]  Encounter Provider: Adam Malik MD  Encounter Date: 2024   Encounter department: [facility]    Assessment & Plan     1. Hypertension, unspecified type    2. Seasonal allergies    3. Hypothyroidism, unspecified type    4. Other insomnia      Patient's blood pressure is 140/72, she has been compliant with her medication, will continue losartan 50 mg and metoprolol 50 mg at this time however if patient have continued worsening Raynaud's phenomenon we can consider substituting losartan 50 mg with amlodipine 5 mg to help with Raynaud's phenomenon in addition to hypertension    For seasonal allergy symptoms, patient may take a half a tablet of Benadryl before going to bed, this will also help with her insomnia    Continue levothyroxine 125 mcg once a day    Follow-up in 6 months for blood work and Medicare wellness visit    Reviewed mammo results and dxa scan       Subjective     HPI    83-year-old female patient presents for follow-up.  Patient reports overall she is doing well, with in [place] in the last month and has noticed some worsening allergy symptoms.  Patient has some pressure in her ear and some watery eye, questionable improvement after returning to [place].  Patient has been using singular, has not tried any antihistamines.  Denies any fevers, no significant pain in the eyes.    Review of Systems   Constitutional:  Negative for chills and fever.   HENT:  Positive for rhinorrhea. Negative for congestion and sore throat.         Fullness in the ear   Respiratory:  Negative for cough, chest tightness and shortness of breath.    Cardiovascular:  Negative for chest pain.   Gastrointestinal:  Negative for abdominal pain.   Neurological:  Negative for dizziness, light-headedness and headaches.   Psychiatric/Behavioral:  Negative for sleep disturbance.        Past Medical History:   Diagnosis Date    Arthritis     Disease  of thyroid gland     hypo    GERD (gastroesophageal reflux disease)     Headache     Hypertension     Overactive bladder     Seasonal allergies     Wears glasses      Past Surgical History:   Procedure Laterality Date    ANKLE SURGERY      x2    APPENDECTOMY      CAST APPLICATION Right 10/25/2022    Procedure: Application short-arm thumb spica splint;  Surgeon: Iban Bob MD;  Location: [facility];  Service: Orthopedics    CHOLECYSTECTOMY      laparoscopic    COLONOSCOPY      GALLBLADDER SURGERY      HYSTERECTOMY Bilateral     total abdominal with removal of both ovaries, age 45    INCISION TENDON SHEATH HAND      of a finger    NEUROPLASTY / TRANSPOSITION MEDIAN NERVE AT CARPAL TUNNEL      OOPHORECTOMY Bilateral     Age 45    NM ARTHRP INTERPOS INTERCARPAL/METACARPAL JOINTS Right 10/25/2022    Procedure: Right thumb interpositional arthroplasty;  Surgeon: Iban Bob MD;  Location: [facility];  Service: Orthopedics    NM INCISION EXTENSOR TENDON SHEATH WRIST Right 10/25/2022    Procedure: Right de Quervain release;  Surgeon: Iban Bob MD;  Location: [facility];  Service: Orthopedics    TUBAL LIGATION       Family History   Problem Relation Age of Onset    Heart disease Mother         endocarditis    Heart disease Father     Prostate cancer Father 85    No Known Problems Sister     No Known Problems Daughter     No Known Problems Maternal Grandmother     No Known Problems Maternal Grandfather     Kidney cancer Paternal Grandmother 50    No Known Problems Paternal Grandfather     No Known Problems Sister     No Known Problems Son     No Known Problems Son     No Known Problems Son     No Known Problems Maternal Aunt     No Known Problems Maternal Aunt     No Known Problems Paternal Aunt     Breast cancer Paternal Aunt 58    No Known Problems Paternal Aunt     No Known Problems Paternal Aunt     Breast cancer Paternal Aunt 56     Social History     Socioeconomic History    Marital status:       Spouse name: None    Number of children: None    Years of education: None    Highest education level: None   Occupational History    None   Tobacco Use    Smoking status: Never    Smokeless tobacco: Never   Vaping Use    Vaping status: Never Used   Substance and Sexual Activity    Alcohol use: Yes     Comment: wine with dinner; occasional use as per [employer]    Drug use: No    Sexual activity: None   Other Topics Concern    None   Social History Narrative    None     Social Determinants of Health     Financial Resource Strain: Patient Declined (6/6/2023)    Overall Financial Resource Strain (CARDIA)     Difficulty of Paying Living Expenses: Patient declined   Food Insecurity: Not on file   Transportation Needs: No Transportation Needs (6/6/2023)    PRAPARE - Transportation     Lack of Transportation (Medical): No     Lack of Transportation (Non-Medical): No   Physical Activity: Not on file   Stress: Not on file   Social Connections: Not on file   Intimate Partner Violence: Not on file   Housing Stability: Not on file     Current Outpatient Medications on File Prior to Visit   Medication Sig    Aspirin (ASPIR-81 PO) Aspir-81 81 MG Oral Tablet Delayed Release  TAKE 1 TABLET (81 MG TOTAL) BY MOUTH DAILY.   Refills: 0       Tiffanie Nicole DO;  Started 27-May-2016  Active    Biotin 1 MG CAPS Biotin 1 MG Oral Capsule   Refills: 0       Tiffanie Nicole DO;  Started 27-May-2016  Active    Cholecalciferol (VITAMIN D3 PO) Take by mouth    Coral Calcium 1000 (390 Ca) MG TABS Coral Calcium 500 MG Oral Tablet   Refills: 0       Tiffanie Nicole DO;  Started 27-May-2016  Active    cyanocobalamin (VITAMIN B-12) 100 MCG tablet Take 100 mcg by mouth daily    docusate sodium (COLACE) 100 mg capsule Take by mouth 3 (three) times a day    doxylamine (UNISON) 25 MG tablet daily at bedtime as needed    Famotidine (PEPCID PO) Take 20 mg by mouth 2 (two) times a day      levothyroxine 125 mcg tablet TAKE 1 TABLET DAILY     "losartan (COZAAR) 50 mg tablet TAKE 1 TABLET DAILY    metoprolol succinate (TOPROL-XL) 50 mg 24 hr tablet TAKE 1 TABLET DAILY. HOLD FOR HEART RATE LESS THAN 50 BEATS PER MINUTE    montelukast (SINGULAIR) 10 mg tablet TAKE 1 TABLET DAILY    Multiple Vitamins-Minerals (MULTIVITAMIN ADULT PO) Take by mouth    Omega-3 1000 MG CAPS Take by mouth    oxybutynin (DITROPAN-XL) 10 MG 24 hr tablet TAKE 1 TABLET DAILY AT BEDTIME    Psyllium (METAMUCIL FIBER PO) Take by mouth    Red Yeast Rice 600 MG CAPS Take by mouth    acetaminophen (TYLENOL) 650 mg CR tablet Take 1 tablet (650 mg total) by mouth every 8 (eight) hours as needed for mild pain    meloxicam (MOBIC) 15 mg tablet TAKE 1 TABLET DAILY AS NEEDED FOR MODERATE PAIN (Patient not taking: Reported on 1/11/2024)     Allergies   Allergen Reactions    Other Nasal Congestion     seasonal     Immunization History   Administered Date(s) Administered    COVID-19 PFIZER VACCINE 0.3 ML IM 01/15/2021, 02/08/2021, 10/08/2022    INFLUENZA 09/15/2016, 09/14/2018, 09/13/2019, 10/04/2021, 09/14/2023    Pneumococcal Conjugate 13-Valent 09/15/2016    Pneumococcal Polysaccharide PPV23 10/01/2014    Tdap 09/15/2016    Zoster 07/31/2017       Objective     /72 (BP Location: Left arm, Patient Position: Sitting, Cuff Size: Standard)   Pulse 90   Temp (!) 97.3 °F (36.3 °C)   Ht 5' 5\" (1.651 m)   Wt 104 kg (229 lb)   SpO2 97%   BMI 38.11 kg/m²     Physical Exam  Vitals reviewed.   Constitutional:       General: She is not in acute distress.     Appearance: Normal appearance. She is not ill-appearing, toxic-appearing or diaphoretic.   HENT:      Right Ear: Tympanic membrane, ear canal and external ear normal. There is no impacted cerumen.      Left Ear: Tympanic membrane, ear canal and external ear normal. There is no impacted cerumen.      Nose: No congestion.      Mouth/Throat:      Mouth: Mucous membranes are moist.   Cardiovascular:      Rate and Rhythm: Normal rate and regular " rhythm.      Pulses: Normal pulses.      Heart sounds: Normal heart sounds. No murmur heard.  Pulmonary:      Effort: Pulmonary effort is normal. No respiratory distress.      Breath sounds: Normal breath sounds.   Abdominal:      General: Abdomen is flat. Bowel sounds are normal.      Palpations: Abdomen is soft.   Musculoskeletal:         General: No swelling or deformity.   Skin:     General: Skin is warm and dry.      Capillary Refill: Capillary refill takes less than 2 seconds.      Coloration: Skin is not jaundiced.   Neurological:      General: No focal deficit present.      Mental Status: She is alert and oriented to person, place, and time.   Psychiatric:         Mood and Affect: Mood normal.             Adam Malik MD

## 2024-01-25 ENCOUNTER — VBI (OUTPATIENT)
Dept: ADMINISTRATIVE | Facility: OTHER | Age: 84
End: 2024-01-25

## 2024-05-14 DIAGNOSIS — T78.40XD ALLERGIC DISORDER, SUBSEQUENT ENCOUNTER: ICD-10-CM

## 2024-05-15 RX ORDER — MONTELUKAST SODIUM 10 MG/1
TABLET ORAL
Qty: 90 TABLET | Refills: 1 | Status: SHIPPED | OUTPATIENT
Start: 2024-05-15

## 2024-05-24 ENCOUNTER — TELEPHONE (OUTPATIENT)
Age: 84
End: 2024-05-24

## 2024-05-31 ENCOUNTER — HOSPITAL ENCOUNTER (OUTPATIENT)
Dept: RADIOLOGY | Facility: HOSPITAL | Age: 84
Discharge: HOME | End: 2024-05-31
Attending: ORTHOPAEDIC SURGERY
Payer: COMMERCIAL

## 2024-05-31 ENCOUNTER — OFFICE VISIT (OUTPATIENT)
Dept: OBGYN CLINIC | Facility: MEDICAL CENTER | Age: 84
End: 2024-05-31
Payer: COMMERCIAL

## 2024-05-31 ENCOUNTER — APPOINTMENT (OUTPATIENT)
Dept: RADIOLOGY | Facility: MEDICAL CENTER | Age: 84
End: 2024-05-31
Payer: COMMERCIAL

## 2024-05-31 VITALS
BODY MASS INDEX: 38.15 KG/M2 | HEIGHT: 65 IN | HEART RATE: 77 BPM | WEIGHT: 229 LBS | SYSTOLIC BLOOD PRESSURE: 135 MMHG | DIASTOLIC BLOOD PRESSURE: 78 MMHG

## 2024-05-31 DIAGNOSIS — M19.011 PRIMARY OSTEOARTHRITIS OF BOTH SHOULDERS: ICD-10-CM

## 2024-05-31 DIAGNOSIS — M19.012 PRIMARY OSTEOARTHRITIS OF BOTH SHOULDERS: ICD-10-CM

## 2024-05-31 DIAGNOSIS — M65.4 DE QUERVAIN'S TENOSYNOVITIS: ICD-10-CM

## 2024-05-31 DIAGNOSIS — M25.512 LEFT SHOULDER PAIN, UNSPECIFIED CHRONICITY: ICD-10-CM

## 2024-05-31 DIAGNOSIS — M25.512 LEFT SHOULDER PAIN, UNSPECIFIED CHRONICITY: Primary | ICD-10-CM

## 2024-05-31 PROCEDURE — 20610 DRAIN/INJ JOINT/BURSA W/O US: CPT | Performed by: ORTHOPAEDIC SURGERY

## 2024-05-31 PROCEDURE — 99214 OFFICE O/P EST MOD 30 MIN: CPT | Performed by: ORTHOPAEDIC SURGERY

## 2024-05-31 PROCEDURE — 20550 NJX 1 TENDON SHEATH/LIGAMENT: CPT | Performed by: ORTHOPAEDIC SURGERY

## 2024-05-31 PROCEDURE — 73030 X-RAY EXAM OF SHOULDER: CPT

## 2024-05-31 RX ORDER — BETAMETHASONE SODIUM PHOSPHATE AND BETAMETHASONE ACETATE 3; 3 MG/ML; MG/ML
12 INJECTION, SUSPENSION INTRA-ARTICULAR; INTRALESIONAL; INTRAMUSCULAR; SOFT TISSUE
Status: COMPLETED | OUTPATIENT
Start: 2024-05-31 | End: 2024-05-31

## 2024-05-31 RX ORDER — BETAMETHASONE SODIUM PHOSPHATE AND BETAMETHASONE ACETATE 3; 3 MG/ML; MG/ML
6 INJECTION, SUSPENSION INTRA-ARTICULAR; INTRALESIONAL; INTRAMUSCULAR; SOFT TISSUE
Status: COMPLETED | OUTPATIENT
Start: 2024-05-31 | End: 2024-05-31

## 2024-05-31 RX ORDER — LIDOCAINE HYDROCHLORIDE 10 MG/ML
0.5 INJECTION, SOLUTION INFILTRATION; PERINEURAL
Status: COMPLETED | OUTPATIENT
Start: 2024-05-31 | End: 2024-05-31

## 2024-05-31 RX ORDER — BETAMETHASONE SODIUM PHOSPHATE AND BETAMETHASONE ACETATE 3; 3 MG/ML; MG/ML
3 INJECTION, SUSPENSION INTRA-ARTICULAR; INTRALESIONAL; INTRAMUSCULAR; SOFT TISSUE
Status: COMPLETED | OUTPATIENT
Start: 2024-05-31 | End: 2024-05-31

## 2024-05-31 RX ORDER — BUPIVACAINE HYDROCHLORIDE 2.5 MG/ML
2 INJECTION, SOLUTION INFILTRATION; PERINEURAL
Status: COMPLETED | OUTPATIENT
Start: 2024-05-31 | End: 2024-05-31

## 2024-05-31 RX ORDER — LIDOCAINE HYDROCHLORIDE 10 MG/ML
2 INJECTION, SOLUTION INFILTRATION; PERINEURAL
Status: COMPLETED | OUTPATIENT
Start: 2024-05-31 | End: 2024-05-31

## 2024-05-31 RX ADMIN — LIDOCAINE HYDROCHLORIDE 2 ML: 10 INJECTION, SOLUTION INFILTRATION; PERINEURAL at 09:30

## 2024-05-31 RX ADMIN — BETAMETHASONE SODIUM PHOSPHATE AND BETAMETHASONE ACETATE 12 MG: 3; 3 INJECTION, SUSPENSION INTRA-ARTICULAR; INTRALESIONAL; INTRAMUSCULAR; SOFT TISSUE at 09:30

## 2024-05-31 RX ADMIN — BUPIVACAINE HYDROCHLORIDE 2 ML: 2.5 INJECTION, SOLUTION INFILTRATION; PERINEURAL at 09:30

## 2024-05-31 RX ADMIN — BETAMETHASONE SODIUM PHOSPHATE AND BETAMETHASONE ACETATE 3 MG: 3; 3 INJECTION, SUSPENSION INTRA-ARTICULAR; INTRALESIONAL; INTRAMUSCULAR; SOFT TISSUE at 09:30

## 2024-05-31 RX ADMIN — BETAMETHASONE SODIUM PHOSPHATE AND BETAMETHASONE ACETATE 6 MG: 3; 3 INJECTION, SUSPENSION INTRA-ARTICULAR; INTRALESIONAL; INTRAMUSCULAR; SOFT TISSUE at 09:30

## 2024-05-31 RX ADMIN — LIDOCAINE HYDROCHLORIDE 0.5 ML: 10 INJECTION, SOLUTION INFILTRATION; PERINEURAL at 09:30

## 2024-05-31 NOTE — PROGRESS NOTES
Assessment:  1. Left shoulder pain, unspecified chronicity  XR shoulder 2+ vw left      2. Primary osteoarthritis of both shoulders        3. De Quervain's tenosynovitis            Plan:  Bilateral shoulder osteoarthritis and left wrist De Quervains  The patient was provided with bilateral glenohumeral and left 1st extensor compartment steroid injections.  The patient tolerated the procedure well.    The patient can follow up as needed and is welcome to return at any point with any new or old issue.      To do next visit:  Return if symptoms worsen or fail to improve.    The above stated was discussed in layman's terms and the patient expressed understanding.  All questions were answered to the patient's satisfaction.       Scribe Attestation      I,:  Shayne Brown am acting as a scribe while in the presence of the attending physician.:       I,:  Saleem Rosa MD personally performed the services described in this documentation    as scribed in my presence.:               Subjective:   Maty Gee is a 84 y.o. female who presents for initial evaluation of bilateral shoulders and left wrist.  She admits to falling a year ago initiating symptoms.  Today she complains left > right anterior shoulder pain and generalized left wrist pain.  Sleeping on left side can aggravate while change of position alleviates.  She does use Tylenol for pain.        Review of systems negative unless otherwise specified in HPI    Past Medical History:   Diagnosis Date    Arthritis     Disease of thyroid gland     hypo    GERD (gastroesophageal reflux disease)     Headache     Hypertension     Overactive bladder     Seasonal allergies     Wears glasses        Past Surgical History:   Procedure Laterality Date    ANKLE SURGERY      x2    APPENDECTOMY      CAST APPLICATION Right 10/25/2022    Procedure: Application short-arm thumb spica splint;  Surgeon: Iban Bob MD;  Location: [facility];  Service: Orthopedics     CHOLECYSTECTOMY      laparoscopic    COLONOSCOPY      GALLBLADDER SURGERY      HYSTERECTOMY Bilateral     total abdominal with removal of both ovaries, age 45    INCISION TENDON SHEATH HAND      of a finger    NEUROPLASTY / TRANSPOSITION MEDIAN NERVE AT CARPAL TUNNEL      OOPHORECTOMY Bilateral     Age 45    AL ARTHRP INTERPOS INTERCARPAL/METACARPAL JOINTS Right 10/25/2022    Procedure: Right thumb interpositional arthroplasty;  Surgeon: Iban Bob MD;  Location: [facility];  Service: Orthopedics    AL INCISION EXTENSOR TENDON SHEATH WRIST Right 10/25/2022    Procedure: Right de Quervain release;  Surgeon: Iban Bob MD;  Location: [facility];  Service: Orthopedics    TUBAL LIGATION         Family History   Problem Relation Age of Onset    Heart disease Mother         endocarditis    Heart disease Father     Prostate cancer Father 85    No Known Problems Sister     No Known Problems Daughter     No Known Problems Maternal Grandmother     No Known Problems Maternal Grandfather     Kidney cancer Paternal Grandmother 50    No Known Problems Paternal Grandfather     No Known Problems Sister     No Known Problems Son     No Known Problems Son     No Known Problems Son     No Known Problems Maternal Aunt     No Known Problems Maternal Aunt     No Known Problems Paternal Aunt     Breast cancer Paternal Aunt 58    No Known Problems Paternal Aunt     No Known Problems Paternal Aunt     Breast cancer Paternal Aunt 56       Social History     Occupational History    Not on file   Tobacco Use    Smoking status: Never    Smokeless tobacco: Never   Vaping Use    Vaping status: Never Used   Substance and Sexual Activity    Alcohol use: Yes     Comment: wine with dinner; occasional use as per [employer]    Drug use: No    Sexual activity: Not on file         Current Outpatient Medications:     acetaminophen (TYLENOL) 650 mg CR tablet, Take 1 tablet (650 mg total) by mouth every 8 (eight) hours as needed for mild  pain, Disp: 30 tablet, Rfl: 0    Aspirin (ASPIR-81 PO), Aspir-81 81 MG Oral Tablet Delayed Release TAKE 1 TABLET (81 MG TOTAL) BY MOUTH DAILY.  Refills: 0    Tiffanie Nicole DO;  Started 27-May-2016 Active, Disp: , Rfl:     Cholecalciferol (VITAMIN D3 PO), Take by mouth, Disp: , Rfl:     Coral Calcium 1000 (390 Ca) MG TABS, Coral Calcium 500 MG Oral Tablet  Refills: 0    Tiffanie Nicole DO;  Started 27-May-2016 Active, Disp: , Rfl:     cyanocobalamin (VITAMIN B-12) 100 MCG tablet, Take 100 mcg by mouth daily, Disp: , Rfl:     docusate sodium (COLACE) 100 mg capsule, Take by mouth 3 (three) times a day, Disp: , Rfl:     doxylamine (UNISON) 25 MG tablet, daily at bedtime as needed, Disp: , Rfl:     Famotidine (PEPCID PO), Take 20 mg by mouth 2 (two) times a day  , Disp: , Rfl:     levothyroxine 125 mcg tablet, TAKE 1 TABLET DAILY, Disp: 90 tablet, Rfl: 3    losartan (COZAAR) 50 mg tablet, TAKE 1 TABLET DAILY, Disp: 90 tablet, Rfl: 3    meloxicam (MOBIC) 15 mg tablet, TAKE 1 TABLET DAILY AS NEEDED FOR MODERATE PAIN, Disp: 90 tablet, Rfl: 1    metoprolol succinate (TOPROL-XL) 50 mg 24 hr tablet, TAKE 1 TABLET DAILY. HOLD FOR HEART RATE LESS THAN 50 BEATS PER MINUTE, Disp: 90 tablet, Rfl: 3    montelukast (SINGULAIR) 10 mg tablet, TAKE 1 TABLET DAILY, Disp: 90 tablet, Rfl: 1    Multiple Vitamins-Minerals (MULTIVITAMIN ADULT PO), Take by mouth, Disp: , Rfl:     Omega-3 1000 MG CAPS, Take by mouth, Disp: , Rfl:     oxybutynin (DITROPAN-XL) 10 MG 24 hr tablet, TAKE 1 TABLET DAILY AT BEDTIME, Disp: 90 tablet, Rfl: 3    Psyllium (METAMUCIL FIBER PO), Take by mouth, Disp: , Rfl:     Red Yeast Rice 600 MG CAPS, Take by mouth, Disp: , Rfl:     Biotin 1 MG CAPS, Biotin 1 MG Oral Capsule  Refills: 0    Tiffanie Nicole DO;  Started 27-May-2016 Active, Disp: , Rfl:     Allergies   Allergen Reactions    Other Nasal Congestion     seasonal            Vitals:    05/31/24 0925   BP: 135/78   Pulse: 77       Objective:  Physical  "exam  General: Awake, Alert, Oriented  Eyes: Pupils equal, round and reactive to light  Heart: regular rate and rhythm  Lungs: No audible wheezing  Abdomen: soft                    Ortho Exam  Bilateral shoulders:    Abduction 165  Good strength with supraspinatus strength testing  Mild TTP over joint  Sensation intact    Left wrist:  Positive Biolase    Diagnostics, reviewed and taken today if performed as documented:    The attending physician has personally reviewed the pertinent films in PACS and interpretation is as follows:  Left shoulder x-ray:  Mild arthritic changes    Procedures, if performed today:    Large joint arthrocentesis: R glenohumeral  [place] Protocol:  Consent: Verbal consent obtained.  Risks and benefits: risks, benefits and alternatives were discussed  Consent given by: patient  Time out: Immediately prior to procedure a \"time out\" was called to verify the correct patient, procedure, equipment, support staff and site/side marked as required.  Timeout called at: 5/31/2024 10:26 AM.  Patient understanding: patient states understanding of the procedure being performed  Site marked: the operative site was marked  Patient identity confirmed: verbally with patient  Supporting Documentation  Indications: pain   Procedure Details  Location: shoulder - R glenohumeral  Preparation: Patient was prepped and draped in the usual sterile fashion  Needle size: 22 G  Ultrasound guidance: no  Approach: posterior  Medications administered: 2 mL bupivacaine 0.25 %; 12 mg betamethasone acetate-betamethasone sodium phosphate 6 (3-3) mg/mL; 2 mL lidocaine 1 %    Patient tolerance: patient tolerated the procedure well with no immediate complications  Dressing:  Sterile dressing applied      Large joint arthrocentesis: L glenohumeral  Universal Protocol:  Consent: Verbal consent obtained.  Risks and benefits: risks, benefits and alternatives were discussed  Consent given by: patient  Time out: " "Immediately prior to procedure a \"time out\" was called to verify the correct patient, procedure, equipment, support staff and site/side marked as required.  Timeout called at: 5/31/2024 10:26 AM.  Patient understanding: patient states understanding of the procedure being performed  Site marked: the operative site was marked  Patient identity confirmed: verbally with patient  Supporting Documentation  Indications: pain   Procedure Details  Location: shoulder - L glenohumeral  Preparation: Patient was prepped and draped in the usual sterile fashion  Needle size: 22 G  Ultrasound guidance: no  Approach: posterior  Medications administered: 2 mL bupivacaine 0.25 %; 12 mg betamethasone acetate-betamethasone sodium phosphate 6 (3-3) mg/mL; 2 mL lidocaine 1 %    Patient tolerance: patient tolerated the procedure well with no immediate complications  Dressing:  Sterile dressing applied      Hand/upper extremity injection: L extensor compartment 1  Universal Protocol:  Consent: Verbal consent obtained. Written consent obtained.  Consent given by: patient  Patient understanding: patient states understanding of the procedure being performed  Site marked: the operative site was marked  Patient identity confirmed: verbally with patient  Supporting Documentation  Indications: pain   Procedure Details  Condition:de Quervain's tenosynovitis Site: L extensor compartment 1   Needle size: 27 G  Ultrasound guidance: no  Approach: volar  Medications administered: 0.5 mL lidocaine 1 %; 6 mg betamethasone acetate-betamethasone sodium phosphate 6 (3-3) mg/mL; 3 mg betamethasone acetate-betamethasone sodium phosphate 6 (3-3) mg/mL  Patient tolerance: patient tolerated the procedure well with no immediate complications               Portions of the record may have been created with voice recognition software.  Occasional wrong word or \"sound a like\" substitutions may have occurred due to the inherent limitations of voice recognition " software.  Read the chart carefully and recognize, using context, where substitutions have occurred.

## 2024-06-04 ENCOUNTER — RA CDI HCC (OUTPATIENT)
Dept: OTHER | Facility: HOSPITAL | Age: 84
End: 2024-06-04

## 2024-06-04 DIAGNOSIS — N32.81 OVERACTIVE BLADDER: ICD-10-CM

## 2024-06-04 DIAGNOSIS — I10 ESSENTIAL HYPERTENSION: ICD-10-CM

## 2024-06-05 RX ORDER — METOPROLOL SUCCINATE 50 MG/1
TABLET, EXTENDED RELEASE ORAL
Qty: 90 TABLET | Refills: 1 | Status: SHIPPED | OUTPATIENT
Start: 2024-06-05

## 2024-06-05 RX ORDER — OXYBUTYNIN CHLORIDE 10 MG/1
TABLET, EXTENDED RELEASE ORAL
Qty: 90 TABLET | Refills: 1 | Status: SHIPPED | OUTPATIENT
Start: 2024-06-05

## 2024-06-10 ENCOUNTER — OFFICE VISIT (OUTPATIENT)
Dept: FAMILY MEDICINE CLINIC | Facility: CLINIC | Age: 84
End: 2024-06-10
Payer: COMMERCIAL

## 2024-06-10 VITALS
OXYGEN SATURATION: 93 % | HEIGHT: 65 IN | HEART RATE: 70 BPM | DIASTOLIC BLOOD PRESSURE: 64 MMHG | BODY MASS INDEX: 36.75 KG/M2 | TEMPERATURE: 97.8 F | WEIGHT: 220.6 LBS | SYSTOLIC BLOOD PRESSURE: 116 MMHG

## 2024-06-10 DIAGNOSIS — D75.89 MACROCYTOSIS WITHOUT ANEMIA: ICD-10-CM

## 2024-06-10 DIAGNOSIS — E78.5 HYPERLIPIDEMIA, UNSPECIFIED HYPERLIPIDEMIA TYPE: ICD-10-CM

## 2024-06-10 DIAGNOSIS — N18.31 STAGE 3A CHRONIC KIDNEY DISEASE (HCC): ICD-10-CM

## 2024-06-10 DIAGNOSIS — I10 HYPERTENSION, UNSPECIFIED TYPE: ICD-10-CM

## 2024-06-10 DIAGNOSIS — E66.01 OBESITY, MORBID (HCC): ICD-10-CM

## 2024-06-10 DIAGNOSIS — Z00.00 MEDICARE ANNUAL WELLNESS VISIT, SUBSEQUENT: Primary | ICD-10-CM

## 2024-06-10 DIAGNOSIS — E03.9 HYPOTHYROIDISM, UNSPECIFIED TYPE: ICD-10-CM

## 2024-06-10 PROCEDURE — G0439 PPPS, SUBSEQ VISIT: HCPCS | Performed by: FAMILY MEDICINE

## 2024-06-10 PROCEDURE — 99214 OFFICE O/P EST MOD 30 MIN: CPT | Performed by: FAMILY MEDICINE

## 2024-06-10 NOTE — PATIENT INSTRUCTIONS
Medicare Preventive Visit Patient Instructions  Thank you for completing your Welcome to Medicare Visit or Medicare Annual Wellness Visit today. Your next wellness visit will be due in one year (6/11/2025).  The screening/preventive services that you may require over the next 5-10 years are detailed below. Some tests may not apply to you based off risk factors and/or age. Screening tests ordered at today's visit but not completed yet may show as past due. Also, please note that scanned in results may not display below.  Preventive Screenings:  Service Recommendations Previous Testing/Comments   Colorectal Cancer Screening  * Colonoscopy    * Fecal Occult Blood Test (FOBT)/Fecal Immunochemical Test (FIT)  * Fecal DNA/Cologuard Test  * Flexible Sigmoidoscopy Age: 45-75 years old   Colonoscopy: every 10 years (may be performed more frequently if at higher risk)  OR  FOBT/FIT: every 1 year  OR  Cologuard: every 3 years  OR  Sigmoidoscopy: every 5 years  Screening may be recommended earlier than age 45 if at higher risk for colorectal cancer. Also, an individualized decision between you and your healthcare provider will decide whether screening between the ages of 76-85 would be appropriate. Colonoscopy: Not on file  FOBT/FIT: Not on file  Cologuard: Not on file  Sigmoidoscopy: Not on file          Breast Cancer Screening Age: 40+ years old  Frequency: every 1-2 years  Not required if history of left and right mastectomy Mammogram: 01/09/2024    Screening Current   Cervical Cancer Screening Between the ages of 21-29, pap smear recommended once every 3 years.   Between the ages of 30-65, can perform pap smear with HPV co-testing every 5 years.   Recommendations may differ for women with a history of total hysterectomy, cervical cancer, or abnormal pap smears in past. Pap Smear: Not on file    Screening Not Indicated   Hepatitis C Screening Once for adults born between 1945 and 1965  More frequently in patients at high  risk for Hepatitis C Hep C Antibody: Not on file        Diabetes Screening 1-2 times per year if you're at risk for diabetes or have pre-diabetes Fasting glucose: 99 mg/dL (6/13/2023)  A1C: No results in last 5 years (No results in last 5 years)  Screening Current   Cholesterol Screening Once every 5 years if you don't have a lipid disorder. May order more often based on risk factors. Lipid panel: 06/13/2023    Screening Not Indicated  History Lipid Disorder     Other Preventive Screenings Covered by Medicare:  Abdominal Aortic Aneurysm (AAA) Screening: covered once if your at risk. You're considered to be at risk if you have a family history of AAA.  Lung Cancer Screening: covers low dose CT scan once per year if you meet all of the following conditions: (1) Age 55-77; (2) No signs or symptoms of lung cancer; (3) Current smoker or have quit smoking within the last 15 years; (4) You have a tobacco smoking history of at least 20 pack years (packs per day multiplied by number of years you smoked); (5) You get a written order from a healthcare provider.  Glaucoma Screening: covered annually if you're considered high risk: (1) You have diabetes OR (2) Family history of glaucoma OR (3)  aged 50 and older OR (4)  American aged 65 and older  Osteoporosis Screening: covered every 2 years if you meet one of the following conditions: (1) You're estrogen deficient and at risk for osteoporosis based off medical history and other findings; (2) Have a vertebral abnormality; (3) On glucocorticoid therapy for more than 3 months; (4) Have primary hyperparathyroidism; (5) On osteoporosis medications and need to assess response to drug therapy.   Last bone density test (DXA Scan): 01/09/2024.  HIV Screening: covered annually if you're between the age of 15-65. Also covered annually if you are younger than 15 and older than 65 with risk factors for HIV infection. For pregnant patients, it is covered up to 3  times per pregnancy.    Immunizations:  Immunization Recommendations   Influenza Vaccine Annual influenza vaccination during flu season is recommended for all persons aged >= 6 months who do not have contraindications   Pneumococcal Vaccine   * Pneumococcal conjugate vaccine = PCV13 (Prevnar 13), PCV15 (Vaxneuvance), PCV20 (Prevnar 20)  * Pneumococcal polysaccharide vaccine = PPSV23 (Pneumovax) Adults 19-63 yo with certain risk factors or if 65+ yo  If never received any pneumonia vaccine: recommend Prevnar 20 (PCV20)  Give PCV20 if previously received 1 dose of PCV13 or PPSV23   Hepatitis B Vaccine 3 dose series if at intermediate or high risk (ex: diabetes, end stage renal disease, liver disease)   Respiratory syncytial virus (RSV) Vaccine - COVERED BY MEDICARE PART D  * RSVPreF3 (Arexvy) CDC recommends that adults 60 years of age and older may receive a single dose of RSV vaccine using shared clinical decision-making (SCDM)   Tetanus (Td) Vaccine - COST NOT COVERED BY MEDICARE PART B Following completion of primary series, a booster dose should be given every 10 years to maintain immunity against tetanus. Td may also be given as tetanus wound prophylaxis.   Tdap Vaccine - COST NOT COVERED BY MEDICARE PART B Recommended at least once for all adults. For pregnant patients, recommended with each pregnancy.   Shingles Vaccine (Shingrix) - COST NOT COVERED BY MEDICARE PART B  2 shot series recommended in those 19 years and older who have or will have weakened immune systems or those 50 years and older     Health Maintenance Due:      Topic Date Due   • Breast Cancer Screening: Mammogram  01/09/2025   • DXA SCAN  01/09/2026     Immunizations Due:      Topic Date Due   • COVID-19 Vaccine (6 - 2023-24 season) 09/01/2023     Advance Directives   What are advance directives?  Advance directives are legal documents that state your wishes and plans for medical care. These plans are made ahead of time in case you lose your  ability to make decisions for yourself. Advance directives can apply to any medical decision, such as the treatments you want, and if you want to donate organs.   What are the types of advance directives?  There are many types of advance directives, and each state has rules about how to use them. You may choose a combination of any of the following:  Living will:  This is a written record of the treatment you want. You can also choose which treatments you do not want, which to limit, and which to stop at a certain time. This includes surgery, medicine, IV fluid, and tube feedings.   Durable power of  for healthcare (DPAHC):  This is a written record that states who you want to make healthcare choices for you when you are unable to make them for yourself. This person, called a proxy, is usually a family member or a friend. You may choose more than 1 proxy.  Do not resuscitate (DNR) order:  A DNR order is used in case your heart stops beating or you stop breathing. It is a request not to have certain forms of treatment, such as CPR. A DNR order may be included in other types of advance directives.  Medical directive:  This covers the care that you want if you are in a coma, near death, or unable to make decisions for yourself. You can list the treatments you want for each condition. Treatment may include pain medicine, surgery, blood transfusions, dialysis, IV or tube feedings, and a ventilator (breathing machine).  Values history:  This document has questions about your views, beliefs, and how you feel and think about life. This information can help others choose the care that you would choose.  Why are advance directives important?  An advance directive helps you control your care. Although spoken wishes may be used, it is better to have your wishes written down. Spoken wishes can be misunderstood, or not followed. Treatments may be given even if you do not want them. An advance directive may make it easier  for your family to make difficult choices about your care.   Urinary Incontinence   Urinary incontinence (UI)  is when you lose control of your bladder. UI develops because your bladder cannot store or empty urine properly. The 3 most common types of UI are stress incontinence, urge incontinence, or both.  Medicines:   May be given to help strengthen your bladder control. Report any side effects of medication to your healthcare provider.  Do pelvic muscle exercises often:  Your pelvic muscles help you stop urinating. Squeeze these muscles tight for 5 seconds, then relax for 5 seconds. Gradually work up to squeezing for 10 seconds. Do 3 sets of 15 repetitions a day, or as directed. This will help strengthen your pelvic muscles and improve bladder control.  Train your bladder:  Go to the bathroom at set times, such as every 2 hours, even if you do not feel the urge to go. You can also try to hold your urine when you feel the urge to go. For example, hold your urine for 5 minutes when you feel the urge to go. As that becomes easier, hold your urine for 10 minutes.   Self-care:   Keep a UI record.  Write down how often you leak urine and how much you leak. Make a note of what you were doing when you leaked urine.  Drink liquids as directed. You may need to limit the amount of liquid you drink to help control your urine leakage. Do not drink any liquid right before you go to bed. Limit or do not have drinks that contain caffeine or alcohol.   Prevent constipation.  Eat a variety of high-fiber foods. Good examples are high-fiber cereals, beans, vegetables, and whole-grain breads. Walking is the best way to trigger your intestines to have a bowel movement.  Exercise regularly and maintain a healthy weight.  Weight loss and exercise will decrease pressure on your bladder and help you control your leakage.   Use a catheter as directed  to help empty your bladder. A catheter is a tiny, plastic tube that is put into your  bladder to drain your urine.   Go to behavior therapy as directed.  Behavior therapy may be used to help you learn to control your urge to urinate.    Weight Management   Why it is important to manage your weight:  Being overweight increases your risk of health conditions such as heart disease, high blood pressure, type 2 diabetes, and certain types of cancer. It can also increase your risk for osteoarthritis, sleep apnea, and other respiratory problems. Aim for a slow, steady weight loss. Even a small amount of weight loss can lower your risk of health problems.  How to lose weight safely:  A safe and healthy way to lose weight is to eat fewer calories and get regular exercise. You can lose up about 1 pound a week by decreasing the number of calories you eat by 500 calories each day.   Healthy meal plan for weight management:  A healthy meal plan includes a variety of foods, contains fewer calories, and helps you stay healthy. A healthy meal plan includes the following:  Eat whole-grain foods more often.  A healthy meal plan should contain fiber. Fiber is the part of grains, fruits, and vegetables that is not broken down by your body. Whole-grain foods are healthy and provide extra fiber in your diet. Some examples of whole-grain foods are whole-wheat breads and pastas, oatmeal, brown rice, and bulgur.  Eat a variety of vegetables every day.  Include dark, leafy greens such as spinach, kale, shawn greens, and mustard greens. Eat yellow and orange vegetables such as carrots, sweet potatoes, and winter squash.   Eat a variety of fruits every day.  Choose fresh or canned fruit (canned in its own juice or light syrup) instead of juice. Fruit juice has very little or no fiber.  Eat low-fat dairy foods.  Drink fat-free (skim) milk or 1% milk. Eat fat-free yogurt and low-fat cottage cheese. Try low-fat cheeses such as mozzarella and other reduced-fat cheeses.  Choose meat and other protein foods that are low in fat.   "Choose beans or other legumes such as split peas or lentils. Choose fish, skinless poultry (chicken or turkey), or lean cuts of red meat (beef or pork). Before you cook meat or poultry, cut off any visible fat.   Use less fat and oil.  Try baking foods instead of frying them. Add less fat, such as margarine, sour cream, regular salad dressing and mayonnaise to foods. Eat fewer high-fat foods. Some examples of high-fat foods include french fries, doughnuts, ice cream, and cakes.  Eat fewer sweets.  Limit foods and drinks that are high in sugar. This includes candy, cookies, regular soda, and sweetened drinks.  Exercise:  Exercise at least 30 minutes per day on most days of the week. Some examples of exercise include walking, biking, dancing, and swimming. You can also fit in more physical activity by taking the stairs instead of the elevator or parking farther away from stores. Ask your healthcare provider about the best exercise plan for you.   Alcohol Use and Your Health    Drinking too much can harm your health.  Excessive alcohol use leads to about 88,000 death in the [place] each year, and shortens the life of those who diet by almost 30 years.  Further, excessive drinking cost the economy $249 billion in 2010.  Most excessive drinkers are not alcohol dependent.    Excessive alcohol use has immediate effects that increase the risk of many harmful health conditions.  These are most often the result of binge drinking.  Over time, excessive alcohol use can lead to the development of chronic diseases and other series health problems.    What is considered a \"drink\"?        Excessive alcohol use includes:  Binge Drinking: For women, 4 or more drinks consumed on one occasion. For men, 5 or more drinks consumed on one occasion.  Heavy Drinking: For women, 8 or more drinks per week. For men, 15 or more drinks per week  Any alcohol used by pregnant women  Any alcohol used by those under the age of 21 years    If " you choose to drink, do so in moderation:  Do not drink at all if you are under the age of 21, or if you are or may be pregnant, or have health problems that could be made worse by drinking.  For women, up to 1 drink per day  For men, up to 2 drinks a day    No one should begin drinking or drink more frequently based on potential health benefits    Short-Term Health Risks:  Injuries: motor vehicle crashes, falls, drownings, burns  Violence: homicide, suicide, sexual assault, intimate partner violence  Alcohol poisoning  Reproductive health: risky sexual behaviors, unintended prengnacy, sexually transmitted diseases, miscarriage, stillbirth, fetal alcohol syndrome    Long-Term Health Risks:  Chronic diseases: high blood pressure, heart disease, stroke, liver disease, digestive problems  Cancers: breast, mouth and throat, liver, colon  Learning and memory problems: dementia, poor school performance  Mental health: depression, anxiety, insomnia  Social problems: lost productivity, family problems, unemployment  Alcohol dependence    For support and more information:  Substance Abuse and Mental Health Services Administration  [address]  [place] [address]  Web Address: http://www.samhsa.gov    Alcoholics Anonymous        Web Address: http://www.aa.org    https://www.cdc.gov/alcohol/fact-sheets/alcohol-use.htm     © Copyright [employer] 2018 Information is for End User's use only and may not be sold, redistributed or otherwise used for commercial purposes. All illustrations and images included in CareNotes® are the copyrighted property of [employer] or [employer]

## 2024-06-10 NOTE — PROGRESS NOTES
Ambulatory Visit  Name: Maty Gee      : 1940      MRN: [id number]  Encounter Provider: Adam Malik MD  Encounter Date: 6/10/2024   Encounter department: [facility]    Assessment & Plan   1. Medicare annual wellness visit, subsequent  -     CBC and differential; Future  -     Comprehensive metabolic panel; Future  2. Hypothyroidism, unspecified type  -     TSH, 3rd generation with Free T4 reflex; Future  3. Hypertension, unspecified type  -     CBC and differential; Future  -     Comprehensive metabolic panel; Future  4. Macrocytosis without anemia  5. Hyperlipidemia, unspecified hyperlipidemia type  -     Lipid panel; Future  6. Obesity, morbid (HCC)  7. Stage 3a chronic kidney disease (HCC)    Blood pressure at goal, will repeat TSH for evaluation of hypothyroidism  Obtain CBC and CMP for evaluation of hypertension, CKD stage III, macrocytosis.  Lipid panel for dyslipidemia      BMI Counseling: Body mass index is 36.71 kg/m². The BMI is above normal. Nutrition recommendations include reducing portion sizes, 3-5 servings of fruits/vegetables daily, and consuming healthier snacks. Exercise recommendations include moderate aerobic physical activity for 150 minutes/week.       Preventive health issues were discussed with patient, and age appropriate screening tests were ordered as noted in patient's After Visit Summary. Personalized health advice and appropriate referrals for health education or preventive services given if needed, as noted in patient's After Visit Summary.    History of Present Illness     HPI     84 year old female presents for AMW visit.  Patient does have some baseline arthralgia, uses a cane for ambulation at times.  Requesting paperwork to be completed for disability placard    Patient Care Team:  Adam Malik MD as PCP - General (Family Medicine)  Adam Malik MD (Family Medicine)    Review of Systems   Constitutional:  Negative for chills and  fever.   Respiratory:  Negative for chest tightness and shortness of breath.    Cardiovascular:  Negative for chest pain.   Gastrointestinal:  Negative for abdominal pain.   Musculoskeletal:  Positive for arthralgias and neck pain.   Allergic/Immunologic: Positive for environmental allergies.   Neurological:  Negative for dizziness, light-headedness and headaches.     Medical History Reviewed by provider this encounter:       Annual Wellness Visit Questionnaire   Maty is here for her Subsequent Wellness visit.     Health Risk Assessment:   Patient rates overall health as good. Patient feels that their physical health rating is same. Patient is very satisfied with their life. Eyesight was rated as same. Hearing was rated as same. Patient feels that their emotional and mental health rating is same. Patients states they are never, rarely angry. Patient states they are sometimes unusually tired/fatigued. Pain experienced in the last 7 days has been some. Patient's pain rating has been 5/10. Patient states that she has experienced no weight loss or gain in last 6 months. Shoulder pain-- she received inj in shoulder and no longer in pain.   Knee pain, wrist pain all stable    Depression Screening:   PHQ-2 Score: 0      Fall Risk Screening:   In the past year, patient has experienced: no history of falling in past year      Urinary Incontinence Screening:   Patient has leaked urine accidently in the last six months. Wears panty liner     Home Safety:  Patient does not have trouble with stairs inside or outside of their home. Patient has working smoke alarms and has working carbon monoxide detector. Home safety hazards include: none. Almost 1 year ago fell after missing a step     Nutrition:   Current diet is Regular.     Medications:   Patient is currently taking over-the-counter supplements. OTC medications include: see medication list. Patient is able to manage medications.     Activities of Daily Living  (ADLs)/Instrumental Activities of Daily Living (IADLs):   Walk and transfer into and out of bed and chair?: Yes  Dress and groom yourself?: Yes    Bathe or shower yourself?: Yes    Feed yourself? Yes  Do your laundry/housekeeping?: Yes  Manage your money, pay your bills and track your expenses?: Yes  Make your own meals?: Yes    Do your own shopping?: Yes    Previous Hospitalizations:   Any hospitalizations or ED visits within the last 12 months?: No      Advance Care Planning:   Living will: Yes    Durable POA for healthcare: Yes    Advanced directive: Yes    Advanced directive counseling given: No      Cognitive Screening:   Provider or family/friend/caregiver concerned regarding cognition?: No    PREVENTIVE SCREENINGS      Cardiovascular Screening:    General: Risks and Benefits Discussed    Due for: Lipid Panel      Diabetes Screening:     General: Screening Current and Risks and Benefits Discussed    Due for: Blood Glucose      Colorectal Cancer Screening:     General: Screening Not Indicated      Breast Cancer Screening:     General: Screening Current      Cervical Cancer Screening:    General: Screening Not Indicated      Osteoporosis Screening:    General: Screening Current      Abdominal Aortic Aneurysm (AAA) Screening:        General: Screening Not Indicated      Lung Cancer Screening:     General: Screening Not Indicated      Hepatitis C Screening:    General: Screening Not Indicated    Screening, Brief Intervention, and Referral to Treatment (SBIRT)    Screening  Typical number of drinks in a day: 2  Typical number of drinks in a week: 14  Interpretation: Low risk drinking behavior.    AUDIT-C Screenin) How often did you have a drink containing alcohol in the past year? 2 to 3 times a week  2) How many drinks did you have on a typical day when you were drinking in the past year? 1 to 2  3) How often did you have 6 or more drinks on one occasion in the past year? never    AUDIT-C Score:  3  Interpretation: Score 3-12 (female): POSITIVE screen for alcohol misuse    AUDIT Screenin) How often during the last year have you found that you were not able to stop drinking once you had started? 0 - never  5) How often during the last year have you failed to do what was normally expected from you because of drinking? 0 - never  6) How often during the last year have you needed a first drink in the morning to get yourself going after a heavy drinking session? 0 - never  7) How often during the last year have you had a feeling of guilt or remorse after drinking? 0 - never  8) How often during the last year have you been unable to remember what happened the night before because you had been drinking? 0 - never  9) Have you or someone else been injured as a result of your drinking? 0 - no  10) Has a relative or friend or a doctor or another health worker been concerned about your drinking or suggested you cut down? 0 - no    AUDIT Score: 3  Interpretation: Low risk alcohol consumption    Single Item Drug Screening:  How often have you used an illegal drug (including marijuana) or a prescription medication for non-medical reasons in the past year? never    Single Item Drug Screen Score: 0  Interpretation: Negative screen for possible drug use disorder    Other Counseling Topics:   Car/seat belt/driving safety, skin self-exam, sunscreen and calcium and vitamin D intake and regular weightbearing exercise.     Social Determinants of Health     Financial Resource Strain: Patient Declined (2023)    Overall Financial Resource Strain (CARDIA)     Difficulty of Paying Living Expenses: Patient declined   Food Insecurity: No Food Insecurity (6/10/2024)    Hunger Vital Sign     Worried About Running Out of Food in the Last Year: Never true     Ran Out of Food in the Last Year: Never true   Transportation Needs: No Transportation Needs (6/10/2024)    PRAPARE - Transportation     Lack of Transportation (Medical): No  "    Lack of Transportation (Non-Medical): No   Housing Stability: Low Risk  (6/10/2024)    Housing Stability Vital Sign     Unable to Pay for Housing in the Last Year: No     Number of Times Moved in the Last Year: 0     Homeless in the Last Year: No   Utilities: Not At Risk (6/10/2024)    [facility] Utilities     Threatened with loss of utilities: No     No results found.    Objective     /64 (BP Location: Left arm, Patient Position: Sitting, Cuff Size: Standard)   Pulse 70   Temp 97.8 °F (36.6 °C)   Ht 5' 5\" (1.651 m)   Wt 100 kg (220 lb 9.6 oz)   SpO2 93%   BMI 36.71 kg/m²     Physical Exam  Vitals reviewed.   Constitutional:       General: She is not in acute distress.     Appearance: Normal appearance. She is obese. She is not ill-appearing, toxic-appearing or diaphoretic.   Cardiovascular:      Rate and Rhythm: Normal rate.      Pulses: Normal pulses.   Pulmonary:      Effort: Pulmonary effort is normal. No respiratory distress.   Abdominal:      General: Abdomen is flat.   Musculoskeletal:         General: No swelling or deformity.   Skin:     General: Skin is warm and dry.      Capillary Refill: Capillary refill takes less than 2 seconds.      Coloration: Skin is not jaundiced.   Neurological:      General: No focal deficit present.      Mental Status: She is alert.   Psychiatric:         Mood and Affect: Mood normal.             Adam Malik M.D.  Family Medicine    Please excuse any \"sound-alike\" errors that may have ocurred during the process of dictation. Parts of this note have been dictated and there may be errors present in the transcription process. Thank you.    "

## 2024-07-01 DIAGNOSIS — I10 HYPERTENSION, UNSPECIFIED TYPE: ICD-10-CM

## 2024-07-01 DIAGNOSIS — E03.9 HYPOTHYROIDISM, UNSPECIFIED TYPE: ICD-10-CM

## 2024-07-01 RX ORDER — LEVOTHYROXINE SODIUM 0.12 MG/1
TABLET ORAL
Qty: 90 TABLET | Refills: 0 | Status: SHIPPED | OUTPATIENT
Start: 2024-07-01

## 2024-07-01 RX ORDER — LOSARTAN POTASSIUM 50 MG/1
TABLET ORAL
Qty: 90 TABLET | Refills: 0 | Status: SHIPPED | OUTPATIENT
Start: 2024-07-01

## 2024-07-16 ENCOUNTER — APPOINTMENT (OUTPATIENT)
Dept: LAB | Age: 84
End: 2024-07-16
Payer: COMMERCIAL

## 2024-07-16 DIAGNOSIS — Z00.00 MEDICARE ANNUAL WELLNESS VISIT, SUBSEQUENT: ICD-10-CM

## 2024-07-16 DIAGNOSIS — E03.9 HYPOTHYROIDISM, UNSPECIFIED TYPE: ICD-10-CM

## 2024-07-16 DIAGNOSIS — E78.5 HYPERLIPIDEMIA, UNSPECIFIED HYPERLIPIDEMIA TYPE: ICD-10-CM

## 2024-07-16 DIAGNOSIS — I10 HYPERTENSION, UNSPECIFIED TYPE: ICD-10-CM

## 2024-07-16 LAB
ALBUMIN SERPL BCG-MCNC: 4 G/DL (ref 3.5–5)
ALP SERPL-CCNC: 57 U/L (ref 34–104)
ALT SERPL W P-5'-P-CCNC: 26 U/L (ref 7–52)
ANION GAP SERPL CALCULATED.3IONS-SCNC: 10 MMOL/L (ref 4–13)
AST SERPL W P-5'-P-CCNC: 26 U/L (ref 13–39)
BASOPHILS # BLD AUTO: 0.06 THOUSANDS/ÂΜL (ref 0–0.1)
BASOPHILS NFR BLD AUTO: 1 % (ref 0–1)
BILIRUB SERPL-MCNC: 0.65 MG/DL (ref 0.2–1)
BUN SERPL-MCNC: 16 MG/DL (ref 5–25)
CALCIUM SERPL-MCNC: 9.5 MG/DL (ref 8.4–10.2)
CHLORIDE SERPL-SCNC: 103 MMOL/L (ref 96–108)
CHOLEST SERPL-MCNC: 210 MG/DL
CO2 SERPL-SCNC: 28 MMOL/L (ref 21–32)
CREAT SERPL-MCNC: 0.72 MG/DL (ref 0.6–1.3)
EOSINOPHIL # BLD AUTO: 0.26 THOUSAND/ÂΜL (ref 0–0.61)
EOSINOPHIL NFR BLD AUTO: 6 % (ref 0–6)
ERYTHROCYTE [DISTWIDTH] IN BLOOD BY AUTOMATED COUNT: 13.1 % (ref 11.6–15.1)
GFR SERPL CREATININE-BSD FRML MDRD: 78 ML/MIN/1.73SQ M
GLUCOSE P FAST SERPL-MCNC: 95 MG/DL (ref 65–99)
HCT VFR BLD AUTO: 46.8 % (ref 34.8–46.1)
HDLC SERPL-MCNC: 82 MG/DL
HGB BLD-MCNC: 15.9 G/DL (ref 11.5–15.4)
IMM GRANULOCYTES # BLD AUTO: 0.03 THOUSAND/UL (ref 0–0.2)
IMM GRANULOCYTES NFR BLD AUTO: 1 % (ref 0–2)
LDLC SERPL CALC-MCNC: 107 MG/DL (ref 0–100)
LYMPHOCYTES # BLD AUTO: 1.78 THOUSANDS/ÂΜL (ref 0.6–4.47)
LYMPHOCYTES NFR BLD AUTO: 38 % (ref 14–44)
MCH RBC QN AUTO: 35.4 PG (ref 26.8–34.3)
MCHC RBC AUTO-ENTMCNC: 34 G/DL (ref 31.4–37.4)
MCV RBC AUTO: 104 FL (ref 82–98)
MONOCYTES # BLD AUTO: 0.7 THOUSAND/ÂΜL (ref 0.17–1.22)
MONOCYTES NFR BLD AUTO: 15 % (ref 4–12)
NEUTROPHILS # BLD AUTO: 1.88 THOUSANDS/ÂΜL (ref 1.85–7.62)
NEUTS SEG NFR BLD AUTO: 39 % (ref 43–75)
NONHDLC SERPL-MCNC: 128 MG/DL
NRBC BLD AUTO-RTO: 0 /100 WBCS
PLATELET # BLD AUTO: 211 THOUSANDS/UL (ref 149–390)
PMV BLD AUTO: 12 FL (ref 8.9–12.7)
POTASSIUM SERPL-SCNC: 4.9 MMOL/L (ref 3.5–5.3)
PROT SERPL-MCNC: 7 G/DL (ref 6.4–8.4)
RBC # BLD AUTO: 4.49 MILLION/UL (ref 3.81–5.12)
SODIUM SERPL-SCNC: 141 MMOL/L (ref 135–147)
TRIGL SERPL-MCNC: 106 MG/DL
TSH SERPL DL<=0.05 MIU/L-ACNC: 3.76 UIU/ML (ref 0.45–4.5)
WBC # BLD AUTO: 4.71 THOUSAND/UL (ref 4.31–10.16)

## 2024-07-16 PROCEDURE — 80053 COMPREHEN METABOLIC PANEL: CPT

## 2024-07-16 PROCEDURE — 80061 LIPID PANEL: CPT

## 2024-07-16 PROCEDURE — 85025 COMPLETE CBC W/AUTO DIFF WBC: CPT

## 2024-07-16 PROCEDURE — 84443 ASSAY THYROID STIM HORMONE: CPT

## 2024-07-16 PROCEDURE — 36415 COLL VENOUS BLD VENIPUNCTURE: CPT

## 2024-09-30 DIAGNOSIS — E03.9 HYPOTHYROIDISM, UNSPECIFIED TYPE: ICD-10-CM

## 2024-09-30 DIAGNOSIS — I10 HYPERTENSION, UNSPECIFIED TYPE: ICD-10-CM

## 2024-09-30 RX ORDER — LOSARTAN POTASSIUM 50 MG/1
TABLET ORAL
Qty: 90 TABLET | Refills: 1 | Status: SHIPPED | OUTPATIENT
Start: 2024-09-30

## 2024-09-30 RX ORDER — LEVOTHYROXINE SODIUM 125 UG/1
TABLET ORAL
Qty: 90 TABLET | Refills: 1 | Status: SHIPPED | OUTPATIENT
Start: 2024-09-30

## 2024-10-21 ENCOUNTER — TELEPHONE (OUTPATIENT)
Age: 84
End: 2024-10-21

## 2024-10-21 DIAGNOSIS — T78.40XD ALLERGIC DISORDER, SUBSEQUENT ENCOUNTER: ICD-10-CM

## 2024-10-21 RX ORDER — MONTELUKAST SODIUM 10 MG/1
TABLET ORAL
Qty: 90 TABLET | Refills: 1 | Status: SHIPPED | OUTPATIENT
Start: 2024-10-21

## 2024-10-21 NOTE — TELEPHONE ENCOUNTER
Caller:Patient    Doctor: Shelley    Reason for call: Patient received her flu and covid vaccine Saturday 10/19 and would like to make sure that wont effect her cortisone injection appt 10/23    Call back#: [phone]

## 2024-10-21 NOTE — TELEPHONE ENCOUNTER
Called and spoke with pt and relayed DEVANG Burgos's message. She requested I confirm the appt also.

## 2024-10-23 ENCOUNTER — OFFICE VISIT (OUTPATIENT)
Dept: OBGYN CLINIC | Facility: HOSPITAL | Age: 84
End: 2024-10-23
Payer: COMMERCIAL

## 2024-10-23 ENCOUNTER — HOSPITAL ENCOUNTER (OUTPATIENT)
Dept: RADIOLOGY | Facility: HOSPITAL | Age: 84
Discharge: HOME | End: 2024-10-23
Attending: ORTHOPAEDIC SURGERY
Payer: COMMERCIAL

## 2024-10-23 VITALS
DIASTOLIC BLOOD PRESSURE: 80 MMHG | WEIGHT: 224 LBS | HEIGHT: 65 IN | HEART RATE: 65 BPM | SYSTOLIC BLOOD PRESSURE: 146 MMHG | BODY MASS INDEX: 37.32 KG/M2

## 2024-10-23 DIAGNOSIS — M19.011 PRIMARY OSTEOARTHRITIS OF BOTH SHOULDERS: ICD-10-CM

## 2024-10-23 DIAGNOSIS — M75.51 SUBACROMIAL BURSITIS OF RIGHT SHOULDER JOINT: ICD-10-CM

## 2024-10-23 DIAGNOSIS — M17.12 PRIMARY OSTEOARTHRITIS OF LEFT KNEE: ICD-10-CM

## 2024-10-23 DIAGNOSIS — M25.511 RIGHT SHOULDER PAIN, UNSPECIFIED CHRONICITY: ICD-10-CM

## 2024-10-23 DIAGNOSIS — M25.562 LEFT KNEE PAIN, UNSPECIFIED CHRONICITY: ICD-10-CM

## 2024-10-23 DIAGNOSIS — M19.012 PRIMARY OSTEOARTHRITIS OF BOTH SHOULDERS: ICD-10-CM

## 2024-10-23 DIAGNOSIS — M25.562 LEFT KNEE PAIN, UNSPECIFIED CHRONICITY: Primary | ICD-10-CM

## 2024-10-23 PROCEDURE — 73030 X-RAY EXAM OF SHOULDER: CPT

## 2024-10-23 PROCEDURE — 99213 OFFICE O/P EST LOW 20 MIN: CPT | Performed by: ORTHOPAEDIC SURGERY

## 2024-10-23 PROCEDURE — 73562 X-RAY EXAM OF KNEE 3: CPT

## 2024-10-23 PROCEDURE — 20610 DRAIN/INJ JOINT/BURSA W/O US: CPT

## 2024-10-23 RX ORDER — BETAMETHASONE SODIUM PHOSPHATE AND BETAMETHASONE ACETATE 3; 3 MG/ML; MG/ML
12 INJECTION, SUSPENSION INTRA-ARTICULAR; INTRALESIONAL; INTRAMUSCULAR; SOFT TISSUE
Status: COMPLETED | OUTPATIENT
Start: 2024-10-23 | End: 2024-10-23

## 2024-10-23 RX ORDER — LIDOCAINE HYDROCHLORIDE 10 MG/ML
4 INJECTION, SOLUTION INFILTRATION; PERINEURAL
Status: COMPLETED | OUTPATIENT
Start: 2024-10-23 | End: 2024-10-23

## 2024-10-23 RX ADMIN — LIDOCAINE HYDROCHLORIDE 4 ML: 10 INJECTION, SOLUTION INFILTRATION; PERINEURAL at 14:30

## 2024-10-23 RX ADMIN — BETAMETHASONE SODIUM PHOSPHATE AND BETAMETHASONE ACETATE 12 MG: 3; 3 INJECTION, SUSPENSION INTRA-ARTICULAR; INTRALESIONAL; INTRAMUSCULAR; SOFT TISSUE at 14:30

## 2024-10-23 NOTE — PROGRESS NOTES
Assessment:   Diagnosis ICD-10-CM Associated Orders   1. Left knee pain, unspecified chronicity  M25.562 XR knee 3 vw left non injury     Large joint arthrocentesis      2. Right shoulder pain, unspecified chronicity  M25.511 XR shoulder 2+ vw right     Large joint arthrocentesis      3. Primary osteoarthritis of both shoulders  M19.011 Large joint arthrocentesis    M19.012 Large joint arthrocentesis      4. Subacromial bursitis of right shoulder joint  M75.51 Large joint arthrocentesis      5. Primary osteoarthritis of left knee  M17.12 Large joint arthrocentesis          Plan:  84 y.o. female with chronic bilateral shoulder pain and left knee pain   S/p b/l shoulder injections 5 months ago   X-rays taken and examined of left knee and right shoulder today, indicating degenerative disease   Left GH joint injected with CSI today   Right shoulder subacromial bursa injected with CSI today   Left knee injected with CSI today   Patient tolerated all procedures well   Follow up as needed       The above stated was discussed in layman's terms and the patient expressed understanding.  All questions were answered to the patient's satisfaction.     To do next visit:  The patient can follow up as needed and is welcome to return at any point with any new or old issue.       Subjective:   Maty Gee is a 84 y.o. female who presents for follow up of bilateral shoulders.  Patient has known osteoarthritis of the left shoulder as well as chronic pain and bursitis of the right shoulder.  She was last seen in May, 2024 and received injections of corticosteroid to her shoulders, which she admits provided about 5 months of pain relief.  Repeat injections offered, accepted, and provided today.  Today she also complains of lateral based left knee pain.  She has known osteoarthritis of the left knee which has been treated in the past with CSI about 10 years prior.  Repeat injection provided today.  Pain score 8/10          Review of  systems negative unless otherwise specified in HPI    Past Medical History:   Diagnosis Date    Arthritis     Disease of thyroid gland     hypo    GERD (gastroesophageal reflux disease)     Headache     Hypertension     Overactive bladder     Seasonal allergies     Wears glasses        Past Surgical History:   Procedure Laterality Date    ANKLE SURGERY      x2    APPENDECTOMY      CAST APPLICATION Right 10/25/2022    Procedure: Application short-arm thumb spica splint;  Surgeon: Iban Bob MD;  Location: [facility];  Service: Orthopedics    CHOLECYSTECTOMY      laparoscopic    COLONOSCOPY      GALLBLADDER SURGERY      HYSTERECTOMY Bilateral     total abdominal with removal of both ovaries, age 45    INCISION TENDON SHEATH HAND      of a finger    NEUROPLASTY / TRANSPOSITION MEDIAN NERVE AT CARPAL TUNNEL      OOPHORECTOMY Bilateral     Age 45    MN ARTHRP INTERPOS INTERCARPAL/METACARPAL JOINTS Right 10/25/2022    Procedure: Right thumb interpositional arthroplasty;  Surgeon: Iban Bob MD;  Location: [facility];  Service: Orthopedics    MN INCISION EXTENSOR TENDON SHEATH WRIST Right 10/25/2022    Procedure: Right de Quervain release;  Surgeon: Iban Bob MD;  Location: [facility];  Service: Orthopedics    TUBAL LIGATION         Family History   Problem Relation Age of Onset    Heart disease Mother         endocarditis    Heart disease Father     Prostate cancer Father 85    No Known Problems Sister     No Known Problems Daughter     No Known Problems Maternal Grandmother     No Known Problems Maternal Grandfather     Kidney cancer Paternal Grandmother 50    No Known Problems Paternal Grandfather     No Known Problems Sister     No Known Problems Son     No Known Problems Son     No Known Problems Son     No Known Problems Maternal Aunt     No Known Problems Maternal Aunt     No Known Problems Paternal Aunt     Breast cancer Paternal Aunt 58    No Known Problems Paternal Aunt     No Known  Problems Paternal Aunt     Breast cancer Paternal Aunt 56       Social History     Occupational History    Not on file   Tobacco Use    Smoking status: Never    Smokeless tobacco: Never   Vaping Use    Vaping status: Never Used   Substance and Sexual Activity    Alcohol use: Yes     Comment: wine with dinner; occasional use as per [employer]    Drug use: No    Sexual activity: Not on file         Current Outpatient Medications:     acetaminophen (TYLENOL) 650 mg CR tablet, Take 1 tablet (650 mg total) by mouth every 8 (eight) hours as needed for mild pain, Disp: 30 tablet, Rfl: 0    Aspirin (ASPIR-81 PO), Aspir-81 81 MG Oral Tablet Delayed Release TAKE 1 TABLET (81 MG TOTAL) BY MOUTH DAILY.  Refills: 0    Garnjost, Tiffanie DO;  Started 27-May-2016 Active, Disp: , Rfl:     Biotin 1 MG CAPS, Biotin 1 MG Oral Capsule  Refills: 0    Garnjost, Tiffanie DO;  Started 27-May-2016 Active, Disp: , Rfl:     Cholecalciferol (VITAMIN D3 PO), Take by mouth, Disp: , Rfl:     Coral Calcium 1000 (390 Ca) MG TABS, Coral Calcium 500 MG Oral Tablet  Refills: 0    Garnjost, Tiffanie DO;  Started 27-May-2016 Active, Disp: , Rfl:     cyanocobalamin (VITAMIN B-12) 100 MCG tablet, Take 100 mcg by mouth daily, Disp: , Rfl:     docusate sodium (COLACE) 100 mg capsule, Take by mouth 3 (three) times a day, Disp: , Rfl:     doxylamine (UNISON) 25 MG tablet, daily at bedtime as needed, Disp: , Rfl:     Famotidine (PEPCID PO), Take 20 mg by mouth 2 (two) times a day  , Disp: , Rfl:     levothyroxine 125 mcg tablet, TAKE 1 TABLET DAILY, Disp: 90 tablet, Rfl: 1    losartan (COZAAR) 50 mg tablet, TAKE 1 TABLET DAILY, Disp: 90 tablet, Rfl: 1    meloxicam (MOBIC) 15 mg tablet, TAKE 1 TABLET DAILY AS NEEDED FOR MODERATE PAIN, Disp: 90 tablet, Rfl: 1    metoprolol succinate (TOPROL-XL) 50 mg 24 hr tablet, TAKE 1 TABLET DAILY. HOLD FOR HEART RATE LESS THAN 50 BEATS PER MINUTE, Disp: 90 tablet, Rfl: 1    montelukast (SINGULAIR) 10 mg tablet, TAKE 1 TABLET DAILY,  Disp: 90 tablet, Rfl: 1    Multiple Vitamins-Minerals (MULTIVITAMIN ADULT PO), Take by mouth, Disp: , Rfl:     Omega-3 1000 MG CAPS, Take by mouth, Disp: , Rfl:     oxybutynin (DITROPAN-XL) 10 MG 24 hr tablet, TAKE 1 TABLET DAILY AT BEDTIME, Disp: 90 tablet, Rfl: 1    Psyllium (METAMUCIL FIBER PO), Take by mouth, Disp: , Rfl:     Red Yeast Rice 600 MG CAPS, Take by mouth, Disp: , Rfl:     Allergies   Allergen Reactions    Other Nasal Congestion     seasonal            Vitals:    10/23/24 1416   BP: 146/80   Pulse: 65       Objective:  Physical exam  General: Awake, Alert, Oriented  Eyes: Pupils equal, round and reactive to light  Heart: regular rate and rhythm  Lungs: No audible wheezing  Abdomen: soft                    Left Knee Exam     Tenderness   The patient is experiencing tenderness in the lateral joint line and medial joint line.    Range of Motion   Extension:  normal   Flexion:  normal     Other   Erythema: absent  Scars: absent  Swelling: mild  Effusion: no effusion present      Right Shoulder Exam     Tenderness   The patient is experiencing tenderness in the acromion.    Other   Erythema: absent  Scars: absent    Comments:  Pain with extension of shoudler       Left Shoulder Exam     Other   Erythema: absent  Scars: absent     Comments:  Pain with extension of shoulder   Slightly limited ROM             Diagnostics, reviewed and taken today if performed as documented:    Right shoulder x-ray:  - Degeneration of glenohumeral joint   - no acute fractures, dislocations     Left knee x-ray:  - Significant tricompartmental osteoarthritis with degenerative changes   - Valgus alignment with near bone-on-bone articulation laterally  - Modest degeneration of patellofemoral joint   - Subchondral sclerosis and multiple osteophytes noted         Procedures, if performed today:    Large joint arthrocentesis: L glenohumeral  Universal Protocol:  Consent: Verbal consent obtained.  Risks and benefits: risks, benefits  "and alternatives were discussed  Consent given by: patient  Site marked: the operative site was marked  Supporting Documentation  Indications: pain   Procedure Details  Location: shoulder - L glenohumeral  Needle size: 22 G  Medications administered: 12 mg betamethasone acetate-betamethasone sodium phosphate 6 (3-3) mg/mL; 4 mL lidocaine 1 %    Patient tolerance: patient tolerated the procedure well with no immediate complications  Dressing:  Sterile dressing applied      Large joint arthrocentesis: R subacromial bursa  Universal Protocol:  Consent: Verbal consent obtained.  Risks and benefits: risks, benefits and alternatives were discussed  Consent given by: patient  Site marked: the operative site was marked  Supporting Documentation  Indications: pain   Procedure Details  Location: shoulder - R subacromial bursa  Needle size: 22 G  Medications administered: 12 mg betamethasone acetate-betamethasone sodium phosphate 6 (3-3) mg/mL; 4 mL lidocaine 1 %    Patient tolerance: patient tolerated the procedure well with no immediate complications  Dressing:  Sterile dressing applied      Large joint arthrocentesis: L knee  Universal Protocol:  Consent: Verbal consent obtained.  Risks and benefits: risks, benefits and alternatives were discussed  Consent given by: patient  Site marked: the operative site was marked  Supporting Documentation  Indications: pain   Procedure Details  Location: knee - L knee  Needle size: 22 G  Approach: anterolateral  Medications administered: 12 mg betamethasone acetate-betamethasone sodium phosphate 6 (3-3) mg/mL; 4 mL lidocaine 1 %    Patient tolerance: patient tolerated the procedure well with no immediate complications  Dressing:  Sterile dressing applied             Portions of the record may have been created with voice recognition software.  Occasional wrong word or \"sound a like\" substitutions may have occurred due to the inherent limitations of voice recognition software.  Read the " chart carefully and recognize, using context, where substitutions have occurred.

## 2024-11-22 DIAGNOSIS — I10 ESSENTIAL HYPERTENSION: ICD-10-CM

## 2024-11-22 DIAGNOSIS — N32.81 OVERACTIVE BLADDER: ICD-10-CM

## 2024-11-22 RX ORDER — METOPROLOL SUCCINATE 50 MG/1
TABLET, EXTENDED RELEASE ORAL
Qty: 90 TABLET | Refills: 1 | Status: SHIPPED | OUTPATIENT
Start: 2024-11-22

## 2024-11-22 RX ORDER — OXYBUTYNIN CHLORIDE 10 MG/1
10 TABLET, EXTENDED RELEASE ORAL
Qty: 90 TABLET | Refills: 1 | Status: SHIPPED | OUTPATIENT
Start: 2024-11-22

## 2024-12-03 ENCOUNTER — RA CDI HCC (OUTPATIENT)
Dept: OTHER | Facility: HOSPITAL | Age: 84
End: 2024-12-03

## 2024-12-10 ENCOUNTER — OFFICE VISIT (OUTPATIENT)
Dept: FAMILY MEDICINE CLINIC | Facility: CLINIC | Age: 84
End: 2024-12-10
Payer: COMMERCIAL

## 2024-12-10 VITALS
SYSTOLIC BLOOD PRESSURE: 138 MMHG | OXYGEN SATURATION: 97 % | HEIGHT: 65 IN | DIASTOLIC BLOOD PRESSURE: 84 MMHG | HEART RATE: 64 BPM | WEIGHT: 225.4 LBS | BODY MASS INDEX: 37.55 KG/M2 | TEMPERATURE: 97 F

## 2024-12-10 DIAGNOSIS — E03.9 HYPOTHYROIDISM, UNSPECIFIED TYPE: ICD-10-CM

## 2024-12-10 DIAGNOSIS — N32.81 OVERACTIVE BLADDER: ICD-10-CM

## 2024-12-10 DIAGNOSIS — E66.01 OBESITY, MORBID (HCC): ICD-10-CM

## 2024-12-10 DIAGNOSIS — I10 HYPERTENSION, UNSPECIFIED TYPE: Primary | ICD-10-CM

## 2024-12-10 DIAGNOSIS — D75.89 MACROCYTOSIS WITHOUT ANEMIA: ICD-10-CM

## 2024-12-10 PROCEDURE — 99214 OFFICE O/P EST MOD 30 MIN: CPT | Performed by: FAMILY MEDICINE

## 2024-12-10 PROCEDURE — G2211 COMPLEX E/M VISIT ADD ON: HCPCS | Performed by: FAMILY MEDICINE

## 2024-12-10 RX ORDER — PHENOL 1.4 %
600 AEROSOL, SPRAY (ML) MUCOUS MEMBRANE 2 TIMES DAILY WITH MEALS
COMMUNITY

## 2024-12-10 RX ORDER — OXYBUTYNIN CHLORIDE 10 MG/1
20 TABLET, EXTENDED RELEASE ORAL
Qty: 90 TABLET | Refills: 1 | Status: SHIPPED | OUTPATIENT
Start: 2024-12-10

## 2024-12-10 NOTE — ASSESSMENT & PLAN NOTE
Patient may try smaller meals more frequently  Increase physical activity as tolerated, walking 30 minutes a day may help with weight loss    At this time no medications recommended

## 2024-12-10 NOTE — PROGRESS NOTES
Name: Maty Gee      : 1940      MRN: [id number]  Encounter Provider: Adam Malik MD  Encounter Date: 12/10/2024   Encounter department: [facility]    Assessment & Plan  Hypertension, unspecified type  Blood pressure at goal 138/84  Hypothyroidism, unspecified type  Most recent blood work stable, continue current levothyroxine 125mcg  Overactive bladder  Increase dosage of oxybutynin to 20mg HS  Macrocytosis without anemia  Stable based on blood work       Obesity, morbid (HCC)  Patient may try smaller meals more frequently  Increase physical activity as tolerated, walking 30 minutes a day may help with weight loss    At this time no medications recommended                History of Present Illness       HPI    Tracy is a 84-year-old female patient presents for follow-up regarding her chronic medical conditions.  Patient reports overall she is doing well, is trying to lose weight, not interested in medication at this time.  Patient has been eating healthier, believes she needs to increase her physical activity level.  Her knee pain is well-controlled at this time.  Spite trying oxybutynin she has occasional episode where she has difficulty controlling her urine.  Pt interested in RSV vaccine.     Reveiwed most recent blood work, patient only to have some mildly elevated LDL and macrocytosis without anemia.       Review of Systems   Constitutional:  Negative for chills and fever.   HENT:  Negative for congestion, rhinorrhea and sore throat.    Respiratory:  Negative for shortness of breath.    Cardiovascular:  Negative for chest pain.   Gastrointestinal:  Negative for abdominal pain.   Genitourinary:         Rare episode of incontinence    Neurological:  Negative for dizziness, light-headedness and headaches.   Psychiatric/Behavioral:  Positive for sleep disturbance.         Tylenol pm helps with sleep      Past Medical History:   Diagnosis Date   • Allergic Seasonal   • Arthritis     • Disease of thyroid gland     hypo   • GERD (gastroesophageal reflux disease)    • Headache    • Hypertension    • Overactive bladder    • Seasonal allergies    • Wears glasses      Past Surgical History:   Procedure Laterality Date   • ANKLE SURGERY      x2   • APPENDECTOMY     • CAST APPLICATION Right 10/25/2022    Procedure: Application short-arm thumb spica splint;  Surgeon: Iban Bob MD;  [facility];  Service: Orthopedics   • CHOLECYSTECTOMY      laparoscopic   • COLONOSCOPY     • GALLBLADDER SURGERY     • HYSTERECTOMY Bilateral     total abdominal with removal of both ovaries, age 45   • INCISION TENDON SHEATH HAND      of a finger   • NEUROPLASTY / TRANSPOSITION MEDIAN NERVE AT CARPAL TUNNEL     • OOPHORECTOMY Bilateral     Age 45   • AZ ARTHRP INTERPOS INTERCARPAL/METACARPAL JOINTS Right 10/25/2022    Procedure: Right thumb interpositional arthroplasty;  Surgeon: Iban Bob MD;  [facility];  Service: Orthopedics   • AZ INCISION EXTENSOR TENDON SHEATH WRIST Right 10/25/2022    Procedure: Right de Quervain release;  Surgeon: Iban Bob MD;  [facility];  Service: Orthopedics   • TUBAL LIGATION       Family History   Problem Relation Age of Onset   • Heart disease Mother         endocarditis   • Heart disease Father    • Prostate cancer Father 85   • No Known Problems Sister    • No Known Problems Daughter    • No Known Problems Maternal Grandmother    • No Known Problems Maternal Grandfather    • Kidney cancer Paternal Grandmother 50   • No Known Problems Paternal Grandfather    • No Known Problems Sister    • No Known Problems Son    • No Known Problems Son    • No Known Problems Son    • No Known Problems Maternal Aunt    • No Known Problems Maternal Aunt    • No Known Problems Paternal Aunt    • Breast cancer Paternal Aunt 58   • No Known Problems Paternal Aunt    • No Known Problems Paternal Aunt    • Breast cancer Paternal Aunt 56     Social History      Tobacco Use   • Smoking status: Never   • Smokeless tobacco: Never   Vaping Use   • Vaping status: Never Used   Substance and Sexual Activity   • Alcohol use: Yes     Alcohol/week: 6.0 standard drinks of alcohol     Types: 6 Glasses of wine per week     Comment: wine with dinner; occasional use as per [employer]   • Drug use: No   • Sexual activity: Not Currently     Birth control/protection: Female Sterilization     Current Outpatient Medications on File Prior to Visit   Medication Sig   • acetaminophen (TYLENOL) 650 mg CR tablet Take 1 tablet (650 mg total) by mouth every 8 (eight) hours as needed for mild pain   • Aspirin (ASPIR-81 PO) Aspir-81 81 MG Oral Tablet Delayed Release  TAKE 1 TABLET (81 MG TOTAL) BY MOUTH DAILY.   Refills: 0       Tiffanie Nicole DO;  Started 27-May-2016  Active   • Biotin 1 MG CAPS Biotin 1 MG Oral Capsule   Refills: 0       Tiffanie Nicole DO;  Started 27-May-2016  Active   • calcium carbonate (OS-SONIA) 600 MG tablet Take 600 mg by mouth 2 (two) times a day with meals   • Cholecalciferol (VITAMIN D3 PO) Take by mouth   • cyanocobalamin (VITAMIN B-12) 100 MCG tablet Take 100 mcg by mouth daily   • docusate sodium (COLACE) 100 mg capsule Take by mouth 3 (three) times a day   • doxylamine (UNISON) 25 MG tablet daily at bedtime as needed   • Famotidine (PEPCID PO) Take 20 mg by mouth 2 (two) times a day     • levothyroxine 125 mcg tablet TAKE 1 TABLET DAILY   • losartan (COZAAR) 50 mg tablet TAKE 1 TABLET DAILY   • meloxicam (MOBIC) 15 mg tablet TAKE 1 TABLET DAILY AS NEEDED FOR MODERATE PAIN   • montelukast (SINGULAIR) 10 mg tablet TAKE 1 TABLET DAILY   • Multiple Vitamins-Minerals (MULTIVITAMIN ADULT PO) Take by mouth   • Omega-3 1000 MG CAPS Take by mouth   • oxybutynin (DITROPAN-XL) 10 MG 24 hr tablet TAKE 1 TABLET DAILY AT BEDTIME   • Psyllium (METAMUCIL FIBER PO) Take by mouth   • Red Yeast Rice 600 MG CAPS Take by mouth   • Coral Calcium 1000 (390 Ca) MG TABS Coral Calcium 500 MG  "Oral Tablet   Refills: 0       Tiffanie Nicole DO;  Started 27-May-2016  Active (Patient not taking: Reported on 12/10/2024)   • metoprolol succinate (TOPROL-XL) 50 mg 24 hr tablet TAKE 1 TABLET DAILY. HOLD FOR HEART RATE LESS THAN 50 BEATS PER MINUTE (Patient not taking: Reported on 12/10/2024)     Allergies   Allergen Reactions   • Other Nasal Congestion     seasonal     Immunization History   Administered Date(s) Administered   • COVID-19 PFIZER VACCINE 0.3 ML IM 01/15/2021, 02/08/2021, 10/11/2021, 10/08/2022   • COVID-19 Pfizer vac (Heath-sucrose, gray cap) 12 yr+ IM 04/05/2022   • INFLUENZA 09/15/2016, 09/14/2018, 09/13/2019, 10/04/2021, 09/14/2023   • Pneumococcal Conjugate 13-Valent 09/15/2016   • Pneumococcal Polysaccharide PPV23 10/01/2014   • Tdap 09/15/2016   • Zoster 07/31/2017     Objective   /84 (BP Location: Right arm, Patient Position: Sitting, Cuff Size: Large)   Pulse 64   Temp (!) 97 °F (36.1 °C) (Temporal)   Ht 5' 5\" (1.651 m)   Wt 102 kg (225 lb 6.4 oz)   SpO2 97%   BMI 37.51 kg/m²     Physical Exam  Vitals reviewed.   Constitutional:       General: She is not in acute distress.     Appearance: Normal appearance. She is obese. She is not ill-appearing, toxic-appearing or diaphoretic.   Cardiovascular:      Rate and Rhythm: Normal rate.      Pulses: Normal pulses.   Pulmonary:      Effort: Pulmonary effort is normal.   Abdominal:      General: Abdomen is flat.   Musculoskeletal:         General: No swelling or deformity.   Skin:     General: Skin is warm and dry.      Capillary Refill: Capillary refill takes less than 2 seconds.      Coloration: Skin is not jaundiced.   Neurological:      General: No focal deficit present.      Mental Status: She is alert.   Psychiatric:         Mood and Affect: Mood normal.       Adam Malik M.D.  Family Medicine    Please excuse any \"sound-alike\" errors that may have ocurred during the process of dictation. Parts of this note have been dictated and " there may be errors present in the transcription process. Thank you.

## 2025-01-14 ENCOUNTER — HOSPITAL ENCOUNTER (OUTPATIENT)
Dept: RADIOLOGY | Age: 85
Discharge: HOME | End: 2025-01-14
Payer: COMMERCIAL

## 2025-01-14 DIAGNOSIS — Z12.31 VISIT FOR SCREENING MAMMOGRAM: ICD-10-CM

## 2025-01-14 PROCEDURE — 77063 BREAST TOMOSYNTHESIS BI: CPT

## 2025-01-14 PROCEDURE — 77067 SCR MAMMO BI INCL CAD: CPT

## 2025-01-23 ENCOUNTER — RESULTS FOLLOW-UP (OUTPATIENT)
Dept: FAMILY MEDICINE CLINIC | Facility: CLINIC | Age: 85
End: 2025-01-23

## 2025-01-30 ENCOUNTER — VBI (OUTPATIENT)
Dept: ADMINISTRATIVE | Facility: OTHER | Age: 85
End: 2025-01-30

## 2025-01-30 NOTE — TELEPHONE ENCOUNTER
01/30/25 1:25 PM     Chart reviewed for Blood Pressure was/were submitted to the patient's insurance.     Ashly Lemus MA   [facility]

## 2025-02-18 ENCOUNTER — OFFICE VISIT (OUTPATIENT)
Dept: FAMILY MEDICINE CLINIC | Facility: CLINIC | Age: 85
End: 2025-02-18
Payer: COMMERCIAL

## 2025-02-18 VITALS
DIASTOLIC BLOOD PRESSURE: 80 MMHG | TEMPERATURE: 97 F | SYSTOLIC BLOOD PRESSURE: 122 MMHG | HEIGHT: 65 IN | WEIGHT: 225.2 LBS | OXYGEN SATURATION: 100 % | BODY MASS INDEX: 37.52 KG/M2 | HEART RATE: 69 BPM

## 2025-02-18 DIAGNOSIS — N32.81 OVERACTIVE BLADDER: ICD-10-CM

## 2025-02-18 DIAGNOSIS — M54.31 SCIATICA OF RIGHT SIDE: ICD-10-CM

## 2025-02-18 DIAGNOSIS — R39.9 UTI SYMPTOMS: Primary | ICD-10-CM

## 2025-02-18 LAB
SL AMB  POCT GLUCOSE, UA: NORMAL
SL AMB LEUKOCYTE ESTERASE,UA: NORMAL
SL AMB POCT BILIRUBIN,UA: NORMAL
SL AMB POCT BLOOD,UA: NORMAL
SL AMB POCT CLARITY,UA: CLEAR
SL AMB POCT COLOR,UA: YELLOW
SL AMB POCT KETONES,UA: NORMAL
SL AMB POCT NITRITE,UA: NORMAL
SL AMB POCT PH,UA: 6
SL AMB POCT SPECIFIC GRAVITY,UA: 1
SL AMB POCT URINE PROTEIN: NORMAL
SL AMB POCT UROBILINOGEN: 0.2

## 2025-02-18 PROCEDURE — 87086 URINE CULTURE/COLONY COUNT: CPT | Performed by: FAMILY MEDICINE

## 2025-02-18 PROCEDURE — G2211 COMPLEX E/M VISIT ADD ON: HCPCS | Performed by: FAMILY MEDICINE

## 2025-02-18 PROCEDURE — 81003 URINALYSIS AUTO W/O SCOPE: CPT | Performed by: FAMILY MEDICINE

## 2025-02-18 PROCEDURE — 99214 OFFICE O/P EST MOD 30 MIN: CPT | Performed by: FAMILY MEDICINE

## 2025-02-18 PROCEDURE — 87186 SC STD MICRODIL/AGAR DIL: CPT | Performed by: FAMILY MEDICINE

## 2025-02-18 PROCEDURE — 87077 CULTURE AEROBIC IDENTIFY: CPT | Performed by: FAMILY MEDICINE

## 2025-02-18 RX ORDER — NAPROXEN 500 MG/1
500 TABLET ORAL 2 TIMES DAILY WITH MEALS
Qty: 28 TABLET | Refills: 0 | Status: SHIPPED | OUTPATIENT
Start: 2025-02-18 | End: 2025-03-04

## 2025-02-18 RX ORDER — OXYBUTYNIN CHLORIDE 10 MG/1
10 TABLET, EXTENDED RELEASE ORAL
Qty: 90 TABLET | Refills: 1 | Status: SHIPPED | OUTPATIENT
Start: 2025-02-18

## 2025-02-18 RX ORDER — ASPIRIN 81 MG
600 TABLET, DELAYED RELEASE (ENTERIC COATED) ORAL 2 TIMES WEEKLY
COMMUNITY

## 2025-02-18 RX ORDER — PREDNISONE 20 MG/1
40 TABLET ORAL DAILY
Qty: 10 TABLET | Refills: 0 | Status: SHIPPED | OUTPATIENT
Start: 2025-02-18 | End: 2025-02-18

## 2025-02-18 RX ORDER — PREDNISONE 20 MG/1
20 TABLET ORAL DAILY
Qty: 5 TABLET | Refills: 0 | Status: SHIPPED | OUTPATIENT
Start: 2025-02-18 | End: 2025-02-23

## 2025-02-18 NOTE — PROGRESS NOTES
Name: Maty Gee      : 1940      MRN: [id number]  Encounter Provider: Adam Malik MD  Encounter Date: 2025   Encounter department: [facility]    Assessment & Plan  UTI symptoms  Mild dysuria, may be secondary to irritation, differential diagnosis include kidney stone, UTI/Nephritis, sciatica.   Urine dip does not demonstrate any significant abnormal finding, UTI less likely.  In absence of hematuria, kidney stone is less likely.  Will treat preemptively with prednisone for sciatica, if there is no significant improvement in pain, if there is abnormal finding urine culture will start antibiotics.  If there is hematuria we will obtain CT scan for evaluation of kidney stone    Orders:    POCT urine dip auto non-scope    Urine culture    Overactive bladder    Orders:    oxybutynin (DITROPAN-XL) 10 MG 24 hr tablet; Take 1 tablet (10 mg total) by mouth daily at bedtime    Urine culture    Sciatica of right side  Patient with significant pain involving the right flank radiating down to right lower extremity, has a history of sciatica.     Patient does have a history of tachycardia with prednisone, after discussion we will lower the dosage down to 20 mg for total 5 days  Stop prednisone if your tachycardia is significant and bothersome  After prednisone patient may try naproxen with food twice a day for the next 14 days    Orders:    predniSONE 20 mg tablet; Take 1 tablet (20 mg total) by mouth daily for 5 days    naproxen (Naprosyn) 500 mg tablet; Take 1 tablet (500 mg total) by mouth 2 (two) times a day with meals for 14 days         History of Present Illness       Flank Pain  Pertinent negatives include no abdominal pain, chest pain, fever or headaches.       Jigna is a 84-year-old female patient presents with UTI symptoms.  Patient reports symptoms started around Friday night.  Patient did have some dysuria symptoms.  Has significant pain on her right flank radiating down to her  right leg.  Skin around her causes pain to be worse.  Patient denies any blood or discoloration of the urine.  No history of UTIs.  Patient denies any recent injuries.  No strenuous physical activities.    Patient has tried increasing dosage of oxybutynin, reports there is no significant improvement, only worsening side effect of dry mouth, would like to stay on 10 mg.    If this does not demonstrate any sign of blood, protein, ketones, nitrates.    Pt did not have any pain when waking up in the morning, pain is worse when getting up    Review of Systems   Constitutional:  Negative for chills and fever.   HENT:  Negative for congestion, rhinorrhea and sore throat.    Respiratory:  Negative for chest tightness and shortness of breath.    Cardiovascular:  Negative for chest pain.   Gastrointestinal:  Negative for abdominal pain.   Genitourinary:  Positive for flank pain.   Musculoskeletal:  Positive for arthralgias and back pain.        Right flank pain radiating down to the groin   Neurological:  Negative for dizziness, light-headedness and headaches.       Past Medical History:   Diagnosis Date    Allergic Seasonal    Arthritis     Disease of thyroid gland     hypo    GERD (gastroesophageal reflux disease)     Headache     Hypertension     Overactive bladder     Seasonal allergies     Wears glasses      Past Surgical History:   Procedure Laterality Date    ANKLE SURGERY      x2    APPENDECTOMY      CAST APPLICATION Right 10/25/2022    Procedure: Application short-arm thumb spica splint;  Surgeon: Iban Bob MD;  [facility];  Service: Orthopedics    CHOLECYSTECTOMY      laparoscopic    COLONOSCOPY      GALLBLADDER SURGERY      HYSTERECTOMY Bilateral     total abdominal with removal of both ovaries, age 45    INCISION TENDON SHEATH HAND      of a finger    NEUROPLASTY / TRANSPOSITION MEDIAN NERVE AT CARPAL TUNNEL      OOPHORECTOMY Bilateral     Age 45    MT ARTHRP INTERCARPAL/CARP/MTCRPL JT  INTERPOSITION Right 10/25/2022    Procedure: Right thumb interpositional arthroplasty;  Surgeon: Iban Bob MD;  [facility];  Service: Orthopedics    MD INCISION EXTENSOR TENDON SHEATH WRIST Right 10/25/2022    Procedure: Right de Quervain release;  Surgeon: Iban Bob MD;  [facility];  Service: Orthopedics    TUBAL LIGATION       Family History   Problem Relation Age of Onset    Heart disease Mother         endocarditis    Heart disease Father     Prostate cancer Father 85    No Known Problems Sister     No Known Problems Daughter     No Known Problems Maternal Grandmother     No Known Problems Maternal Grandfather     Kidney cancer Paternal Grandmother 50    No Known Problems Paternal Grandfather     No Known Problems Sister     No Known Problems Son     No Known Problems Son     No Known Problems Son     No Known Problems Maternal Aunt     No Known Problems Maternal Aunt     No Known Problems Paternal Aunt     Breast cancer Paternal Aunt 58    No Known Problems Paternal Aunt     No Known Problems Paternal Aunt     Breast cancer Paternal Aunt 56     Social History     Tobacco Use    Smoking status: Never    Smokeless tobacco: Never   Vaping Use    Vaping status: Never Used   Substance and Sexual Activity    Alcohol use: Yes     Alcohol/week: 6.0 standard drinks of alcohol     Types: 6 Glasses of wine per week     Comment: wine with dinner; occasional use as per [employer]    Drug use: No    Sexual activity: Not Currently     Birth control/protection: Female Sterilization     Current Outpatient Medications on File Prior to Visit   Medication Sig    acetaminophen (TYLENOL) 650 mg CR tablet Take 1 tablet (650 mg total) by mouth every 8 (eight) hours as needed for mild pain    Aspirin (ASPIR-81 PO) Aspir-81 81 MG Oral Tablet Delayed Release  TAKE 1 TABLET (81 MG TOTAL) BY MOUTH DAILY.   Refills: 0       Tiffanie Nicole DO;  Started 27-May-2016  Active    Biotin 1 MG CAPS Biotin 1 MG  Oral Capsule   Refills: 0       Tiffanie Nicole DO;  Started 27-May-2016  Active    Calcium Carb-Cholecalciferol (Calcium 600 + D) 600-5 MG-MCG TABS Take 600 mg by mouth 2 (two) times a week    calcium carbonate (OS-SONIA) 600 MG tablet Take 600 mg by mouth 2 (two) times a day with meals    Cholecalciferol (VITAMIN D3 PO) Take by mouth    cyanocobalamin (VITAMIN B-12) 100 MCG tablet Take 100 mcg by mouth daily    docusate sodium (COLACE) 100 mg capsule Take by mouth 3 (three) times a day    doxylamine (UNISON) 25 MG tablet daily at bedtime as needed    Famotidine (PEPCID PO) Take 20 mg by mouth 2 (two) times a day      levothyroxine 125 mcg tablet TAKE 1 TABLET DAILY    losartan (COZAAR) 50 mg tablet TAKE 1 TABLET DAILY    metoprolol succinate (TOPROL-XL) 50 mg 24 hr tablet TAKE 1 TABLET DAILY. HOLD FOR HEART RATE LESS THAN 50 BEATS PER MINUTE    montelukast (SINGULAIR) 10 mg tablet TAKE 1 TABLET DAILY    Multiple Vitamins-Minerals (MULTIVITAMIN ADULT PO) Take by mouth    Omega-3 1000 MG CAPS Take by mouth    Psyllium (METAMUCIL FIBER PO) Take by mouth    Red Yeast Rice 600 MG CAPS Take by mouth    [DISCONTINUED] oxybutynin (DITROPAN-XL) 10 MG 24 hr tablet Take 2 tablets (20 mg total) by mouth daily at bedtime    Coral Calcium 1000 (390 Ca) MG TABS  (Patient not taking: Reported on 2/18/2025)    [DISCONTINUED] meloxicam (MOBIC) 15 mg tablet TAKE 1 TABLET DAILY AS NEEDED FOR MODERATE PAIN (Patient not taking: Reported on 2/18/2025)     Allergies   Allergen Reactions    Other Nasal Congestion     seasonal     Immunization History   Administered Date(s) Administered    COVID-19 Moderna mRNA Vaccine 12 Yr+ 50 mcg/0.5 mL (Spikevax) 10/19/2024    COVID-19 PFIZER VACCINE 0.3 ML IM 01/15/2021, 02/08/2021, 10/11/2021, 10/08/2022    COVID-19 Pfizer vac (Heath-sucrose, gray cap) 12 yr+ IM 04/05/2022    INFLUENZA 09/15/2016, 09/14/2018, 09/13/2019, 10/04/2021, 09/14/2023    Influenza, high dose seasonal 0.7 mL 10/19/2024     "Pneumococcal Conjugate 13-Valent 09/15/2016    Pneumococcal Polysaccharide PPV23 10/01/2014    Tdap 09/15/2016    Zoster 07/31/2017     Objective   /80 (BP Location: Right arm, Patient Position: Sitting, Cuff Size: Large)   Pulse 69   Temp (!) 97 °F (36.1 °C) (Temporal)   Ht 5' 5\" (1.651 m)   Wt 102 kg (225 lb 3.2 oz)   SpO2 100%   BMI 37.48 kg/m²     Physical Exam  Vitals reviewed.   Constitutional:       General: She is not in acute distress.     Appearance: Normal appearance. She is obese. She is not ill-appearing, toxic-appearing or diaphoretic.   Cardiovascular:      Rate and Rhythm: Normal rate.      Pulses: Normal pulses.   Pulmonary:      Effort: Pulmonary effort is normal.   Abdominal:      Tenderness: There is no right CVA tenderness or left CVA tenderness.   Musculoskeletal:         General: Tenderness present. No swelling or deformity.      Comments: Tenderness over the right flank, no visible rashes  Patient reports pain does seem to improve with forward bending.  Minimal pain with lateral rotation and flexion.  Extension does not cause pain to be significantly worse  Transitioning from sitting to standing position makes the pain worse   Skin:     General: Skin is warm and dry.      Capillary Refill: Capillary refill takes less than 2 seconds.      Coloration: Skin is not jaundiced.   Neurological:      General: No focal deficit present.      Mental Status: She is alert.   Psychiatric:         Mood and Affect: Mood normal.       "

## 2025-02-18 NOTE — ASSESSMENT & PLAN NOTE
Orders:    oxybutynin (DITROPAN-XL) 10 MG 24 hr tablet; Take 1 tablet (10 mg total) by mouth daily at bedtime    Urine culture

## 2025-02-20 ENCOUNTER — RESULTS FOLLOW-UP (OUTPATIENT)
Dept: FAMILY MEDICINE CLINIC | Facility: CLINIC | Age: 85
End: 2025-02-20

## 2025-02-20 DIAGNOSIS — R39.9 UTI SYMPTOMS: Primary | ICD-10-CM

## 2025-02-20 LAB
BACTERIA UR CULT: ABNORMAL
BACTERIA UR CULT: ABNORMAL

## 2025-02-20 RX ORDER — CEPHALEXIN 500 MG/1
500 CAPSULE ORAL 3 TIMES DAILY
Qty: 21 CAPSULE | Refills: 0 | Status: SHIPPED | OUTPATIENT
Start: 2025-02-20 | End: 2025-02-27

## 2025-02-20 NOTE — TELEPHONE ENCOUNTER
Pt called to see if an answer was given. Pt said she also wants to know if she should take the Naproxen also. Please, advise.

## 2025-03-25 ENCOUNTER — OFFICE VISIT (OUTPATIENT)
Dept: PLASTIC SURGERY | Facility: CLINIC | Age: 85
End: 2025-03-25
Payer: COMMERCIAL

## 2025-03-25 VITALS
BODY MASS INDEX: 37.49 KG/M2 | WEIGHT: 225 LBS | TEMPERATURE: 96.4 F | DIASTOLIC BLOOD PRESSURE: 83 MMHG | HEIGHT: 65 IN | SYSTOLIC BLOOD PRESSURE: 162 MMHG | HEART RATE: 89 BPM

## 2025-03-25 DIAGNOSIS — M79.89 SOFT TISSUE MASS: Primary | ICD-10-CM

## 2025-03-25 PROCEDURE — 99202 OFFICE O/P NEW SF 15 MIN: CPT

## 2025-03-25 NOTE — PROGRESS NOTES
[facility]  [address], [address], [place] [address]  [phone]    Patient Identification: Maty Gee is a 84 y.o. female     History of Present Illness: The patient is a 84 y.o.  year-old female  who presents to the office for a new patient consultation regarding a soft tissue mass to the left axilla. Patient states she noticed the mass about 3 weeks ago within her left lateral breast/axilla. States it causes discomfort at times. She denies fevers, chills, signs of infection or significant pain.     Patient denies factors contributing to poor wound healing such as diabetes, tobacco use, chronic steroid use, immunotherapy/chemotherapy drug use and blood thinning mediations.  She denies any cardiac or pulmonary conditions.    Patient had a recent mammogram January 2025 that was negative for malignancy.     Patient has no complaints at this time.    Past Medical History:   Diagnosis Date    Allergic Seasonal    Arthritis     Disease of thyroid gland     hypo    GERD (gastroesophageal reflux disease)     Headache     Hypertension     Overactive bladder     Seasonal allergies     Wears glasses           Review of Systems  Constitutional: Denies fevers, chills or pain.  Skin: Denies any warmth, erythema, edema or mucopurulent drainage. Soft tissue mass left axilla.    Physical Exam  Vitals:    03/25/25 1326   BP: 162/83   Pulse: 89   Temp: (!) 96.4 °F (35.8 °C)     Constitutional:AAOx3, well-developed, no distress. Pt is cooperative and pleasant.  Cardiovascular: Normal rate, regular rhythm.  Pulmonary/Chest: Effort normal and breath sounds normal. No respiratory distress.  Neuro: Gait in tact. Cranial nerves 2-12 grossly intact  Psychiatric: Has appropriate behavior and thought process.   Extremities: Full ROM, no gross abnormalities.  Skin: 3 inch x 2 inch soft, mobile mas to the left axilla/lateral breast. No overlying skin changes. Lateral accessory breast tissue  noted. No overlying skin changes. See media    Assessment and Plan:  The patient is an 84 y.o.  year-old female who presents to the office for a new patient consultation regarding left axillary soft tissue mass.       -At today's visit chart reviewed, history obtained, physical exam conducted. Discussed with the patient the mass is likely lipomatous in nature, but due to the size and location, will order an US to assess the area. Pt agrees with plan  -The patient is to return in 3-4 weeks with completed US to review findings and discuss further surgical intervention.   -The patient is to call the office with any questions or concerns. All of the patient's questions were answered at this time and they agree with the plan of care.    I have spent a total time of 20 minutes in caring for this patient on the day of the visit/encounter including Impressions, Documenting in the medical record, Reviewing/placing orders in the medical record (including tests, medications, and/or procedures), and Obtaining or reviewing history  .     Ellen Castellanos PA-C  [facility]

## 2025-03-28 ENCOUNTER — TELEPHONE (OUTPATIENT)
Age: 85
End: 2025-03-28

## 2025-03-28 NOTE — TELEPHONE ENCOUNTER
Received call from Patient regarding VM that was left with her by Damien    Patient verbalized she cannot get the US MSK GUIDANCE scheduled until June 19th.     Patient also verbalized she wants to cancel the pre-op scheduled 4/22/25 2:00 Alyce Sams because she can't get the US MSK GUIDANCE done in time.      Princess [facility]: Please call back Patient [phone]

## 2025-03-31 NOTE — TELEPHONE ENCOUNTER
Just following up on this. Patient just needs her pre-op r/s whenever you have a moment since her Us is not scheduled until 4/19. Thank you in advance!

## 2025-04-11 DIAGNOSIS — I10 HYPERTENSION, UNSPECIFIED TYPE: ICD-10-CM

## 2025-04-11 DIAGNOSIS — E03.9 HYPOTHYROIDISM, UNSPECIFIED TYPE: ICD-10-CM

## 2025-04-11 RX ORDER — LOSARTAN POTASSIUM 50 MG/1
50 TABLET ORAL DAILY
Qty: 90 TABLET | Refills: 1 | Status: SHIPPED | OUTPATIENT
Start: 2025-04-11

## 2025-04-11 RX ORDER — LEVOTHYROXINE SODIUM 125 UG/1
125 TABLET ORAL DAILY
Qty: 90 TABLET | Refills: 1 | Status: SHIPPED | OUTPATIENT
Start: 2025-04-11

## 2025-04-21 DIAGNOSIS — T78.40XD ALLERGIC DISORDER, SUBSEQUENT ENCOUNTER: ICD-10-CM

## 2025-04-21 RX ORDER — MONTELUKAST SODIUM 10 MG/1
10 TABLET ORAL DAILY
Qty: 90 TABLET | Refills: 1 | Status: SHIPPED | OUTPATIENT
Start: 2025-04-21

## 2025-05-20 ENCOUNTER — HOSPITAL ENCOUNTER (OUTPATIENT)
Dept: MAMMOGRAPHY | Facility: CLINIC | Age: 85
Discharge: HOME | End: 2025-05-20
Payer: COMMERCIAL

## 2025-05-20 VITALS — BODY MASS INDEX: 37.49 KG/M2 | HEIGHT: 65 IN | WEIGHT: 225 LBS

## 2025-05-20 DIAGNOSIS — M79.89 SOFT TISSUE MASS: ICD-10-CM

## 2025-05-20 PROCEDURE — 76642 ULTRASOUND BREAST LIMITED: CPT

## 2025-05-21 ENCOUNTER — TELEPHONE (OUTPATIENT)
Age: 85
End: 2025-05-21

## 2025-05-21 DIAGNOSIS — N32.81 OVERACTIVE BLADDER: ICD-10-CM

## 2025-05-21 DIAGNOSIS — I10 ESSENTIAL HYPERTENSION: ICD-10-CM

## 2025-05-21 RX ORDER — METOPROLOL SUCCINATE 50 MG/1
TABLET, EXTENDED RELEASE ORAL
Qty: 90 TABLET | Refills: 1 | Status: SHIPPED | OUTPATIENT
Start: 2025-05-21

## 2025-05-21 RX ORDER — OXYBUTYNIN CHLORIDE 10 MG/1
10 TABLET, EXTENDED RELEASE ORAL
Qty: 90 TABLET | Refills: 1 | Status: SHIPPED | OUTPATIENT
Start: 2025-05-21

## 2025-05-21 NOTE — TELEPHONE ENCOUNTER
Pt calling in regards to her 7/8/25 FU appt with Dr Mead    She stated she wants to cancel the appt, she does not need to come back and see Dr NAVARRO due to results of U/S she had done yesterday    Pt stated results show mass is just fatty tissue, nothing to be concerned about    Canceled appt for pt, advised I will send a note to Princess/Dr NAVARRO as a heads up, and they may call her to discuss results

## 2025-06-18 ENCOUNTER — RA CDI HCC (OUTPATIENT)
Dept: OTHER | Facility: HOSPITAL | Age: 85
End: 2025-06-18

## 2025-07-18 ENCOUNTER — OFFICE VISIT (OUTPATIENT)
Dept: FAMILY MEDICINE CLINIC | Facility: CLINIC | Age: 85
End: 2025-07-18
Payer: COMMERCIAL

## 2025-07-18 VITALS
DIASTOLIC BLOOD PRESSURE: 66 MMHG | SYSTOLIC BLOOD PRESSURE: 136 MMHG | HEART RATE: 81 BPM | WEIGHT: 220 LBS | OXYGEN SATURATION: 98 % | TEMPERATURE: 97.7 F | RESPIRATION RATE: 16 BRPM | HEIGHT: 65 IN | BODY MASS INDEX: 36.65 KG/M2

## 2025-07-18 DIAGNOSIS — I10 HYPERTENSION, UNSPECIFIED TYPE: ICD-10-CM

## 2025-07-18 DIAGNOSIS — G89.29 CHRONIC PAIN OF RIGHT KNEE: ICD-10-CM

## 2025-07-18 DIAGNOSIS — E78.5 HYPERLIPIDEMIA, UNSPECIFIED HYPERLIPIDEMIA TYPE: ICD-10-CM

## 2025-07-18 DIAGNOSIS — M25.561 CHRONIC PAIN OF RIGHT KNEE: ICD-10-CM

## 2025-07-18 DIAGNOSIS — E03.9 HYPOTHYROIDISM, UNSPECIFIED TYPE: ICD-10-CM

## 2025-07-18 DIAGNOSIS — Z00.00 MEDICARE ANNUAL WELLNESS VISIT, SUBSEQUENT: Primary | ICD-10-CM

## 2025-07-18 DIAGNOSIS — E66.01 OBESITY, MORBID (HCC): ICD-10-CM

## 2025-07-18 PROCEDURE — G2211 COMPLEX E/M VISIT ADD ON: HCPCS | Performed by: FAMILY MEDICINE

## 2025-07-18 PROCEDURE — G0439 PPPS, SUBSEQ VISIT: HCPCS | Performed by: FAMILY MEDICINE

## 2025-07-18 PROCEDURE — 99214 OFFICE O/P EST MOD 30 MIN: CPT | Performed by: FAMILY MEDICINE

## 2025-07-18 NOTE — ASSESSMENT & PLAN NOTE
Obtain CBC and CMP for evaluation, blood pressure today is 136/66  At goal, goal blood pressure should be less than 150/90  Orders:    Comprehensive metabolic panel; Future    CBC and differential; Future

## 2025-07-18 NOTE — ASSESSMENT & PLAN NOTE
Obtain x-ray for evaluation, follow-up in 1 to 2 weeks for steroid injection for the right knee followed by left knee  Orders:    XR knee 4+ vw right injury; Future

## 2025-07-18 NOTE — PROGRESS NOTES
Name: Maty Gee      : 1940      MRN: [id number]  Encounter Provider: Adam Malik MD  Encounter Date: 2025   Encounter department: [facility]  :  Assessment & Plan  Medicare annual wellness visit, subsequent  Completed at this time       Hypothyroidism, unspecified type  Repeat TSH to monitor thyroid function, continue levothyroxine 125 mcg daily  Orders:    TSH, 3rd generation with Free T4 reflex; Future    Hypertension, unspecified type  Obtain CBC and CMP for evaluation, blood pressure today is 136/66  At goal, goal blood pressure should be less than 150/90  Orders:    Comprehensive metabolic panel; Future    CBC and differential; Future    Hyperlipidemia, unspecified hyperlipidemia type  Repeat lipid panel  Orders:    Lipid panel; Future    Obesity, morbid (HCC)  Pt is motivated to lose weight before her next medication         Chronic pain of right knee  Obtain x-ray for evaluation, follow-up in 1 to 2 weeks for steroid injection for the right knee followed by left knee  Orders:    XR knee 4+ vw right injury; Future       Preventive health issues were discussed with patient, and age appropriate screening tests were ordered as noted in patient's After Visit Summary. Personalized health advice and appropriate referrals for health education or preventive services given if needed, as noted in patient's After Visit Summary.    History of Present Illness       HPI     Pt here for AMW visit. Patient just returning from vacation, was in a car for for total of 10 hours during the trip.  Has been feeling some stiffness and pain in her bilateral lower extremity.  Patient is with history of osteoarthritis noted on x-ray for the left knee.  She is interested in steroid injection, agreeable with x-ray of right knee for evaluation.  Last completed back in 2017.    Patient Care Team:  Adam Malik MD as PCP - General (Family Medicine)  Adam Malik MD (Family  Medicine)    Review of Systems   Constitutional:  Negative for chills and fever.   HENT:  Negative for congestion, rhinorrhea and sore throat.    Respiratory:  Negative for chest tightness and shortness of breath.    Cardiovascular:  Negative for chest pain.   Gastrointestinal:  Negative for abdominal pain, constipation, diarrhea, nausea and vomiting.   Neurological:  Negative for dizziness, light-headedness and headaches.   Psychiatric/Behavioral:  Negative for sleep disturbance.          Medical History Reviewed by provider this encounter:       Annual Wellness Visit Questionnaire   Maty is here for her Subsequent Wellness visit.     Health Risk Assessment:   Patient rates overall health as very good. Patient feels that their physical health rating is same. Patient is satisfied with their life. Eyesight was rated as same. Hearing was rated as same. Patient feels that their emotional and mental health rating is same. Patients states they are never, rarely angry. Patient states they are sometimes unusually tired/fatigued. Pain experienced in the last 7 days has been some. Patient's pain rating has been 5/10. Patient states that she has experienced no weight loss or gain in last 6 months.     Depression Screening:   PHQ-2 Score: 0      Fall Risk Screening:   In the past year, patient has experienced: no history of falling in past year      Urinary Incontinence Screening:   Patient has leaked urine accidently in the last six months.     Home Safety:  Patient does not have trouble with stairs inside or outside of their home. Patient has working smoke alarms and has working carbon monoxide detector. Home safety hazards include: none.     Nutrition:   Current diet is Regular.     Medications:   Patient is currently taking over-the-counter supplements. OTC medications include: see medication list. Patient is able to manage medications.     Activities of Daily Living (ADLs)/Instrumental Activities of Daily Living  (IADLs):   Walk and transfer into and out of bed and chair?: Yes  Dress and groom yourself?: Yes    Bathe or shower yourself?: Yes    Feed yourself? Yes  Do your laundry/housekeeping?: Yes  Manage your money, pay your bills and track your expenses?: Yes  Make your own meals?: Yes    Do your own shopping?: Yes    Previous Hospitalizations:   Any hospitalizations or ED visits within the last 12 months?: No      Advance Care Planning:   Living will: Yes    Durable POA for healthcare: Yes    Advanced directive: Yes      Cognitive Screening:   Provider or family/friend/caregiver concerned regarding cognition?: No    Preventive Screenings      Cardiovascular Screening:    General: Screening Current    Due for: Lipid Panel      Diabetes Screening:     General: Risks and Benefits Discussed    Due for: Blood Glucose      Colorectal Cancer Screening:     General: Screening Not Indicated      Breast Cancer Screening:     General: Screening Current      Cervical Cancer Screening:    General: Screening Not Indicated      Osteoporosis Screening:    General: Screening Not Indicated and History Osteoporosis      Abdominal Aortic Aneurysm (AAA) Screening:        General: Screening Not Indicated      Lung Cancer Screening:     General: Screening Not Indicated      Hepatitis C Screening:    General: Screening Not Indicated    Immunizations:  - Immunizations due: Zoster (Shingrix)    Screening, Brief Intervention, and Referral to Treatment (SBIRT)     Screening  Typical number of drinks in a day: 2  Typical number of drinks in a week: 10  Interpretation: Low risk drinking behavior.    AUDIT-C Screenin) How often did you have a drink containing alcohol in the past year? 4 or more times a week  2) How many drinks did you have on a typical day when you were drinking in the past year? 1 to 2  3) How often did you have 6 or more drinks on one occasion in the past year? never    AUDIT-C Score: 4  Interpretation: Score 3-12 (female):  POSITIVE screen for alcohol misuse    AUDIT Screenin) How often during the last year have you found that you were not able to stop drinking once you had started? 0 - never  5) How often during the last year have you failed to do what was normally expected from you because of drinking? 0 - never  6) How often during the last year have you needed a first drink in the morning to get yourself going after a heavy drinking session? 0 - never  7) How often during the last year have you had a feeling of guilt or remorse after drinking? 0 - never  8) How often during the last year have you been unable to remember what happened the night before because you had been drinking? 0 - never  9) Have you or someone else been injured as a result of your drinking? 0 - no  10) Has a relative or friend or a doctor or another health worker been concerned about your drinking or suggested you cut down? 0 - no    AUDIT Score: 4  Interpretation: Low risk alcohol consumption    Single Item Drug Screening:  How often have you used an illegal drug (including marijuana) or a prescription medication for non-medical reasons in the past year? never    Single Item Drug Screen Score: 0  Interpretation: Negative screen for possible drug use disorder    Other Counseling Topics:   Car/seat belt/driving safety, skin self-exam, sunscreen and calcium and vitamin D intake and regular weightbearing exercise.     Social Drivers of Health     Financial Resource Strain: Patient Declined (2023)    Overall Financial Resource Strain (CARDIA)     Difficulty of Paying Living Expenses: Patient declined   Food Insecurity: No Food Insecurity (2025)    Nursing - Inadequate Food Risk Classification     Worried About Running Out of Food in the Last Year: Never true     Ran Out of Food in the Last Year: Never true   Transportation Needs: No Transportation Needs (2025)    PRAPARE - Transportation     Lack of Transportation (Medical): No     Lack of  "Transportation (Non-Medical): No   Housing Stability: Low Risk  (7/14/2025)    Housing Stability Vital Sign     Unable to Pay for Housing in the Last Year: No     Number of Times Moved in the Last Year: 0     Homeless in the Last Year: No   Utilities: Not At Risk (7/14/2025)    [facility] Utilities     Threatened with loss of utilities: No     No results found.    Objective   /60 (BP Location: Right arm, Patient Position: Sitting, Cuff Size: Large)   Pulse 81   Temp 97.7 °F (36.5 °C) (Temporal)   Resp 16   Ht 5' 4.5\" (1.638 m)   Wt 99.8 kg (220 lb)   SpO2 98%   BMI 37.18 kg/m²     Physical Exam  Vitals reviewed.   Constitutional:       General: She is not in acute distress.     Appearance: Normal appearance. She is obese. She is not ill-appearing, toxic-appearing or diaphoretic.     Cardiovascular:      Rate and Rhythm: Normal rate and regular rhythm.      Pulses: Normal pulses.      Heart sounds: Normal heart sounds. No murmur heard.  Pulmonary:      Effort: Pulmonary effort is normal. No respiratory distress.      Breath sounds: Normal breath sounds. No wheezing.   Abdominal:      General: Abdomen is flat.     Musculoskeletal:         General: No swelling.     Skin:     General: Skin is warm and dry.      Capillary Refill: Capillary refill takes less than 2 seconds.      Coloration: Skin is not jaundiced.     Neurological:      General: No focal deficit present.      Mental Status: She is alert.     Psychiatric:         Mood and Affect: Mood normal.           Adam Malik M.D.  Family Medicine    Please excuse any \"sound-alike\" errors that may have ocurred during the process of dictation. Parts of this note have been dictated and there may be errors present in the transcription process. Thank you.    "

## 2025-07-18 NOTE — ASSESSMENT & PLAN NOTE
Repeat TSH to monitor thyroid function, continue levothyroxine 125 mcg daily  Orders:    TSH, 3rd generation with Free T4 reflex; Future

## 2025-07-22 ENCOUNTER — APPOINTMENT (OUTPATIENT)
Dept: LAB | Age: 85
End: 2025-07-22
Payer: COMMERCIAL

## 2025-07-22 DIAGNOSIS — I10 HYPERTENSION, UNSPECIFIED TYPE: ICD-10-CM

## 2025-07-22 DIAGNOSIS — E78.5 HYPERLIPIDEMIA, UNSPECIFIED HYPERLIPIDEMIA TYPE: ICD-10-CM

## 2025-07-22 DIAGNOSIS — E03.9 HYPOTHYROIDISM, UNSPECIFIED TYPE: ICD-10-CM

## 2025-07-22 LAB
ALBUMIN SERPL BCG-MCNC: 3.9 G/DL (ref 3.5–5)
ALP SERPL-CCNC: 54 U/L (ref 34–104)
ALT SERPL W P-5'-P-CCNC: 17 U/L (ref 7–52)
ANION GAP SERPL CALCULATED.3IONS-SCNC: 5 MMOL/L (ref 4–13)
AST SERPL W P-5'-P-CCNC: 19 U/L (ref 13–39)
BASOPHILS # BLD AUTO: 0.04 THOUSANDS/ÂΜL (ref 0–0.1)
BASOPHILS NFR BLD AUTO: 1 % (ref 0–1)
BILIRUB SERPL-MCNC: 0.75 MG/DL (ref 0.2–1)
BUN SERPL-MCNC: 16 MG/DL (ref 5–25)
CALCIUM SERPL-MCNC: 9.7 MG/DL (ref 8.4–10.2)
CHLORIDE SERPL-SCNC: 106 MMOL/L (ref 96–108)
CHOLEST SERPL-MCNC: 189 MG/DL (ref ?–200)
CO2 SERPL-SCNC: 31 MMOL/L (ref 21–32)
CREAT SERPL-MCNC: 0.72 MG/DL (ref 0.6–1.3)
EOSINOPHIL # BLD AUTO: 0.16 THOUSAND/ÂΜL (ref 0–0.61)
EOSINOPHIL NFR BLD AUTO: 4 % (ref 0–6)
ERYTHROCYTE [DISTWIDTH] IN BLOOD BY AUTOMATED COUNT: 12.7 % (ref 11.6–15.1)
GFR SERPL CREATININE-BSD FRML MDRD: 77 ML/MIN/1.73SQ M
GLUCOSE P FAST SERPL-MCNC: 101 MG/DL (ref 65–99)
HCT VFR BLD AUTO: 44.7 % (ref 34.8–46.1)
HDLC SERPL-MCNC: 74 MG/DL
HGB BLD-MCNC: 15.4 G/DL (ref 11.5–15.4)
IMM GRANULOCYTES # BLD AUTO: 0.01 THOUSAND/UL (ref 0–0.2)
IMM GRANULOCYTES NFR BLD AUTO: 0 % (ref 0–2)
LDLC SERPL CALC-MCNC: 88 MG/DL (ref 0–100)
LYMPHOCYTES # BLD AUTO: 1.86 THOUSANDS/ÂΜL (ref 0.6–4.47)
LYMPHOCYTES NFR BLD AUTO: 40 % (ref 14–44)
MCH RBC QN AUTO: 35.3 PG (ref 26.8–34.3)
MCHC RBC AUTO-ENTMCNC: 34.5 G/DL (ref 31.4–37.4)
MCV RBC AUTO: 103 FL (ref 82–98)
MONOCYTES # BLD AUTO: 0.79 THOUSAND/ÂΜL (ref 0.17–1.22)
MONOCYTES NFR BLD AUTO: 17 % (ref 4–12)
NEUTROPHILS # BLD AUTO: 1.77 THOUSANDS/ÂΜL (ref 1.85–7.62)
NEUTS SEG NFR BLD AUTO: 38 % (ref 43–75)
NONHDLC SERPL-MCNC: 115 MG/DL
NRBC BLD AUTO-RTO: 0 /100 WBCS
PLATELET # BLD AUTO: 192 THOUSANDS/UL (ref 149–390)
PMV BLD AUTO: 12.1 FL (ref 8.9–12.7)
POTASSIUM SERPL-SCNC: 4.8 MMOL/L (ref 3.5–5.3)
PROT SERPL-MCNC: 6.7 G/DL (ref 6.4–8.4)
RBC # BLD AUTO: 4.36 MILLION/UL (ref 3.81–5.12)
SODIUM SERPL-SCNC: 142 MMOL/L (ref 135–147)
T4 FREE SERPL-MCNC: 1.15 NG/DL (ref 0.61–1.12)
TRIGL SERPL-MCNC: 134 MG/DL (ref ?–150)
TSH SERPL DL<=0.05 MIU/L-ACNC: 5.75 UIU/ML (ref 0.45–4.5)
WBC # BLD AUTO: 4.63 THOUSAND/UL (ref 4.31–10.16)

## 2025-07-22 PROCEDURE — 80061 LIPID PANEL: CPT

## 2025-07-22 PROCEDURE — 84439 ASSAY OF FREE THYROXINE: CPT

## 2025-07-22 PROCEDURE — 80053 COMPREHEN METABOLIC PANEL: CPT

## 2025-07-22 PROCEDURE — 85025 COMPLETE CBC W/AUTO DIFF WBC: CPT

## 2025-07-22 PROCEDURE — 84443 ASSAY THYROID STIM HORMONE: CPT

## 2025-07-22 PROCEDURE — 36415 COLL VENOUS BLD VENIPUNCTURE: CPT

## 2025-07-24 DIAGNOSIS — E03.9 HYPOTHYROIDISM, UNSPECIFIED TYPE: Primary | ICD-10-CM

## 2025-07-29 ENCOUNTER — APPOINTMENT (OUTPATIENT)
Dept: RADIOLOGY | Facility: MEDICAL CENTER | Age: 85
End: 2025-07-29
Payer: COMMERCIAL

## 2025-07-29 ENCOUNTER — PROCEDURE VISIT (OUTPATIENT)
Dept: FAMILY MEDICINE CLINIC | Facility: CLINIC | Age: 85
End: 2025-07-29
Payer: COMMERCIAL

## 2025-07-29 VITALS
WEIGHT: 221.2 LBS | BODY MASS INDEX: 36.85 KG/M2 | TEMPERATURE: 97.6 F | SYSTOLIC BLOOD PRESSURE: 116 MMHG | OXYGEN SATURATION: 96 % | HEART RATE: 83 BPM | HEIGHT: 65 IN | DIASTOLIC BLOOD PRESSURE: 72 MMHG

## 2025-07-29 DIAGNOSIS — G89.29 CHRONIC PAIN OF RIGHT KNEE: ICD-10-CM

## 2025-07-29 DIAGNOSIS — M25.561 CHRONIC PAIN OF RIGHT KNEE: ICD-10-CM

## 2025-07-29 DIAGNOSIS — M17.12 PRIMARY OSTEOARTHRITIS OF LEFT KNEE: Primary | ICD-10-CM

## 2025-07-29 PROCEDURE — 20610 DRAIN/INJ JOINT/BURSA W/O US: CPT | Performed by: FAMILY MEDICINE

## 2025-07-29 PROCEDURE — 73564 X-RAY EXAM KNEE 4 OR MORE: CPT

## 2025-07-29 RX ORDER — TRIAMCINOLONE ACETONIDE 40 MG/ML
40 INJECTION, SUSPENSION INTRA-ARTICULAR; INTRAMUSCULAR
Status: COMPLETED | OUTPATIENT
Start: 2025-07-29 | End: 2025-07-29

## 2025-07-29 RX ADMIN — TRIAMCINOLONE ACETONIDE 40 MG: 40 INJECTION, SUSPENSION INTRA-ARTICULAR; INTRAMUSCULAR at 15:00

## 2025-08-05 ENCOUNTER — OFFICE VISIT (OUTPATIENT)
Dept: FAMILY MEDICINE CLINIC | Facility: CLINIC | Age: 85
End: 2025-08-05
Payer: COMMERCIAL

## 2025-08-05 VITALS
DIASTOLIC BLOOD PRESSURE: 88 MMHG | HEIGHT: 65 IN | HEART RATE: 65 BPM | TEMPERATURE: 97 F | OXYGEN SATURATION: 98 % | SYSTOLIC BLOOD PRESSURE: 132 MMHG | WEIGHT: 221.8 LBS | BODY MASS INDEX: 36.96 KG/M2

## 2025-08-05 DIAGNOSIS — M17.11 PRIMARY OSTEOARTHRITIS OF RIGHT KNEE: Primary | ICD-10-CM

## 2025-08-05 PROCEDURE — 20610 DRAIN/INJ JOINT/BURSA W/O US: CPT | Performed by: FAMILY MEDICINE

## 2025-08-05 RX ORDER — ROPIVACAINE HYDROCHLORIDE 5 MG/ML
2 INJECTION, SOLUTION EPIDURAL; INFILTRATION; PERINEURAL
Status: COMPLETED | OUTPATIENT
Start: 2025-08-05 | End: 2025-08-05

## 2025-08-05 RX ORDER — TRIAMCINOLONE ACETONIDE 40 MG/ML
40 INJECTION, SUSPENSION INTRA-ARTICULAR; INTRAMUSCULAR
Status: COMPLETED | OUTPATIENT
Start: 2025-08-05 | End: 2025-08-05

## 2025-08-05 RX ADMIN — TRIAMCINOLONE ACETONIDE 40 MG: 40 INJECTION, SUSPENSION INTRA-ARTICULAR; INTRAMUSCULAR at 12:00

## 2025-08-05 RX ADMIN — ROPIVACAINE HYDROCHLORIDE 2 ML: 5 INJECTION, SOLUTION EPIDURAL; INFILTRATION; PERINEURAL at 12:00

## (undated) DEVICE — GAUZE SPONGES,16 PLY: Brand: CURITY

## (undated) DEVICE — ACE WRAP 4 IN UNSTERILE

## (undated) DEVICE — STERILE BETHLEHEM PLASTIC HAND: Brand: CARDINAL HEALTH

## (undated) DEVICE — GLOVE INDICATOR PI UNDERGLOVE SZ 8 BLUE

## (undated) DEVICE — DISPOSABLE EQUIPMENT COVER: Brand: SMALL TOWEL DRAPE

## (undated) DEVICE — ARM SLING: Brand: DEROYAL

## (undated) DEVICE — SUT FIBERWIRE #2 1/2 CIRCLE T-5 38IN AR-7200

## (undated) DEVICE — OCCLUSIVE GAUZE STRIP,3% BISMUTH TRIBROMOPHENATE IN PETROLATUM BLEND: Brand: XEROFORM

## (undated) DEVICE — GELFOAM 100

## (undated) DEVICE — CUFF TOURNIQUET 18 X 4 IN QUICK CONNECT DISP 1 BLADDER

## (undated) DEVICE — PADDING CAST 4 IN  COTTON STRL

## (undated) DEVICE — GLOVE SRG BIOGEL 7.5

## (undated) DEVICE — SUT VICRYL 3-0 SH 27 IN J416H

## (undated) DEVICE — SUT PROLENE 4-0 PS-2 18 IN 8682G

## (undated) DEVICE — NEEDLE 25G X 1 1/2

## (undated) DEVICE — SPONGE PVP SCRUB WING STERILE